# Patient Record
Sex: FEMALE | Race: WHITE | Employment: OTHER | URBAN - METROPOLITAN AREA
[De-identification: names, ages, dates, MRNs, and addresses within clinical notes are randomized per-mention and may not be internally consistent; named-entity substitution may affect disease eponyms.]

---

## 2017-01-23 RX ORDER — BLOOD SUGAR DIAGNOSTIC
STRIP MISCELLANEOUS
Qty: 100 STRIP | Refills: 11 | Status: SHIPPED | OUTPATIENT
Start: 2017-01-23 | End: 2017-07-06 | Stop reason: SDUPTHER

## 2017-02-20 ENCOUNTER — LAB ONLY (OUTPATIENT)
Dept: INTERNAL MEDICINE CLINIC | Age: 72
End: 2017-02-20

## 2017-02-20 ENCOUNTER — HOSPITAL ENCOUNTER (OUTPATIENT)
Dept: LAB | Age: 72
Discharge: HOME | End: 2017-02-20
Payer: MEDICARE

## 2017-02-20 DIAGNOSIS — E78.5 HYPERLIPIDEMIA, UNSPECIFIED HYPERLIPIDEMIA TYPE: ICD-10-CM

## 2017-02-20 DIAGNOSIS — E11.9 TYPE 2 DIABETES MELLITUS WITHOUT COMPLICATION (HCC): ICD-10-CM

## 2017-02-20 PROCEDURE — 83036 HEMOGLOBIN GLYCOSYLATED A1C: CPT

## 2017-02-20 PROCEDURE — 36415 COLL VENOUS BLD VENIPUNCTURE: CPT

## 2017-02-20 PROCEDURE — 80053 COMPREHEN METABOLIC PANEL: CPT

## 2017-02-20 PROCEDURE — 80061 LIPID PANEL: CPT

## 2017-02-21 LAB
ALBUMIN SERPL-MCNC: 4.1 G/DL (ref 3.5–4.8)
ALBUMIN/GLOB SERPL: 1.9 {RATIO} (ref 1.1–2.5)
ALP SERPL-CCNC: 56 IU/L (ref 39–117)
ALT SERPL-CCNC: 15 IU/L (ref 0–32)
AST SERPL-CCNC: 19 IU/L (ref 0–40)
BILIRUB SERPL-MCNC: 0.4 MG/DL (ref 0–1.2)
BUN SERPL-MCNC: 14 MG/DL (ref 8–27)
BUN/CREAT SERPL: 20 (ref 11–26)
CALCIUM SERPL-MCNC: 9.4 MG/DL (ref 8.7–10.3)
CHLORIDE SERPL-SCNC: 104 MMOL/L (ref 96–106)
CHOLEST SERPL-MCNC: 161 MG/DL (ref 100–199)
CO2 SERPL-SCNC: 25 MMOL/L (ref 18–29)
CREAT SERPL-MCNC: 0.71 MG/DL (ref 0.57–1)
EST. AVERAGE GLUCOSE BLD GHB EST-MCNC: 163 MG/DL
GLOBULIN SER CALC-MCNC: 2.2 G/DL (ref 1.5–4.5)
GLUCOSE SERPL-MCNC: 87 MG/DL (ref 65–99)
HBA1C MFR BLD: 7.3 % (ref 4.8–5.6)
HDLC SERPL-MCNC: 74 MG/DL
INTERPRETATION, 910389: NORMAL
LDLC SERPL CALC-MCNC: 78 MG/DL (ref 0–99)
Lab: NORMAL
POTASSIUM SERPL-SCNC: 4.7 MMOL/L (ref 3.5–5.2)
PROT SERPL-MCNC: 6.3 G/DL (ref 6–8.5)
SODIUM SERPL-SCNC: 144 MMOL/L (ref 134–144)
TRIGL SERPL-MCNC: 45 MG/DL (ref 0–149)
VLDLC SERPL CALC-MCNC: 9 MG/DL (ref 5–40)

## 2017-02-27 ENCOUNTER — TELEPHONE (OUTPATIENT)
Dept: INTERNAL MEDICINE CLINIC | Age: 72
End: 2017-02-27

## 2017-02-27 ENCOUNTER — OFFICE VISIT (OUTPATIENT)
Dept: INTERNAL MEDICINE CLINIC | Age: 72
End: 2017-02-27

## 2017-02-27 VITALS
RESPIRATION RATE: 16 BRPM | HEART RATE: 82 BPM | TEMPERATURE: 98.2 F | WEIGHT: 150.6 LBS | OXYGEN SATURATION: 96 % | BODY MASS INDEX: 24.2 KG/M2 | DIASTOLIC BLOOD PRESSURE: 78 MMHG | SYSTOLIC BLOOD PRESSURE: 119 MMHG | HEIGHT: 66 IN

## 2017-02-27 DIAGNOSIS — Z79.4 TYPE 2 DIABETES MELLITUS WITHOUT COMPLICATION, WITH LONG-TERM CURRENT USE OF INSULIN (HCC): ICD-10-CM

## 2017-02-27 DIAGNOSIS — Z12.31 ENCOUNTER FOR SCREENING MAMMOGRAM FOR BREAST CANCER: ICD-10-CM

## 2017-02-27 DIAGNOSIS — Z00.00 ROUTINE GENERAL MEDICAL EXAMINATION AT A HEALTH CARE FACILITY: Primary | ICD-10-CM

## 2017-02-27 DIAGNOSIS — Z13.39 SCREENING FOR ALCOHOLISM: ICD-10-CM

## 2017-02-27 DIAGNOSIS — E11.9 TYPE 2 DIABETES MELLITUS WITHOUT COMPLICATION, WITH LONG-TERM CURRENT USE OF INSULIN (HCC): ICD-10-CM

## 2017-02-27 DIAGNOSIS — E78.5 HYPERLIPIDEMIA, UNSPECIFIED HYPERLIPIDEMIA TYPE: ICD-10-CM

## 2017-02-27 DIAGNOSIS — M81.0 OSTEOPOROSIS: ICD-10-CM

## 2017-02-27 DIAGNOSIS — M21.612 BUNION OF GREAT TOE OF LEFT FOOT: ICD-10-CM

## 2017-02-27 NOTE — PROGRESS NOTES
HPI  Ms. Rl Hurt is a 66y.o. year old female, she is seen today for follow up HTN, high cholesterol, DM. Brings glucose log:   Am fastin-195  Evening 114-226   Numbers have been much better in the last 2 weeks as she has been more diligent with following diabetic diet. Exercises at [employer] to the pool 2-3 times per week - also taking maricel chi class. Denies chest pain, shortness of breath, dizziness/lightheadedness, headaches, visual changes, edema. Also c/o painful knot left on left foot, irritated area right foot - no sores on feet. No loss of sensation. No injury. Area on right foot has resolved now, felt like small granule bottom of foot. Saw gyn in fall, had breast exam.  No changes noted on bse. Chief Complaint   Patient presents with    Blood Pressure Check     Room 2// NON fasting    Cholesterol Problem     Review Lab Results    Diabetes    Foot Problem     irritated area on bilat. feet         Prior to Admission medications    Medication Sig Start Date End Date Taking? Authorizing Provider   [facility] ULTRA TEST strip USE TO TEST BLOOD SUGAR THREE TIMES A DAY DX E11.9 17  Yes Antwon Quintero MD   BD INSULIN SYRINGE ULTRA-FINE 0.3 mL 31 gauge x  syrg USE ONE (1) TWICE DAILY 10/31/16  Yes Antwon Quintero MD   metFORMIN (GLUCOPHAGE) 500 mg tablet TAKE TWO (2) TABLET(S) EAC H MORNING, TAKE ONE TABLET AT NOON, AND TAKE ONE TABLE T WITH DINNER 16  Yes Antwon Quintero MD   spironolactone (ALDACTONE) 50 mg tablet TAKE ONE (1) TABLET(S) DAILY 16  Yes Antwon Quintero MD   LANTUS 100 unit/mL injection INJECT 11 UNITS EVERY MORNI NG AND 3 UNITS EVERY EVENI NG FOR DIABETES CONTROL 16  Yes Antwon Quintero MD   losartan (COZAAR) 50 mg tablet TAKE ONE (1) TABLET(S) DAILY 3/28/16  Yes MD INDIRA Martin ULTRASOFT LANCETS misc TEST FOUR TIMES DAILY . Kaylee Dread Kaylee Dread .02 8/27/15  Yes MD INDIRA Wen ULTRASOFT LANCETS misc TEST FOUR TIMES DAILY .. Mlraymundo Peralta .02 4/4/15  Yes Dez Villarreal MD   Calcium Carbonate-Vit D3-Min (CALTRATE 600+D PLUS MINERALS) 600 mg (1,500 mg)-400 unit Chew Take  by mouth daily. 6/27/12  Yes Dipak Banerjee MD   multivitamins-minerals-lutein (CENTRUM SILVER) Tab Take  by mouth. Yes Historical Provider   cholecalciferol, vitamin d3, (VITAMIN D) 1,000 unit tablet Take  by mouth daily. Yes Historical Provider         Allergies   Allergen Reactions    Lisinopril Cough    Simvastatin Myalgia         REVIEW OF SYSTEMS:  Per HPI    PHYSICAL EXAM:  Visit Vitals    /78 (BP 1 Location: Right arm, BP Patient Position: Sitting)    Pulse 82    Temp 98.2 °F (36.8 °C) (Oral)    Resp 16    Ht 5' 6\" (1.676 m)    Wt 150 lb 9.6 oz (68.3 kg)    SpO2 96%    BMI 24.31 kg/m2     Constitutional: Appears well-developed and well-nourished. No distress. HENT:   Head: Normocephalic and atraumatic. Eyes: No scleral icterus. Neck: no lad, no tm, supple   Cardiovascular: Normal S1/S2, regular rhythm. No murmurs, rubs, or gallops. Pulmonary/Chest: Effort normal and breath sounds normal. No respiratory distress. No wheezes, rhonchi, or rales. Ext: No edema. Neurological: Alert. Psychiatric: Normal mood and affect. Behavior is normal.    DM foot exam: 2+ pedal pulses, no lesions, sensation intact to monofilament b/l , +bunion left foot      Lab Results   Component Value Date/Time    Sodium 144 02/20/2017 09:28 AM    Potassium 4.7 02/20/2017 09:28 AM    Chloride 104 02/20/2017 09:28 AM    CO2 25 02/20/2017 09:28 AM    Anion gap 9 11/01/2010 08:07 AM    Glucose 87 02/20/2017 09:28 AM    BUN 14 02/20/2017 09:28 AM    Creatinine 0.71 02/20/2017 09:28 AM    BUN/Creatinine ratio 20 02/20/2017 09:28 AM    GFR est AA 98 02/20/2017 09:28 AM    GFR est non-AA 85 02/20/2017 09:28 AM    Calcium 9.4 02/20/2017 09:28 AM    Bilirubin, total 0.4 02/20/2017 09:28 AM    AST (SGOT) 19 02/20/2017 09:28 AM    Alk.  phosphatase 56 02/20/2017 09:28 AM    Protein, total 6.3 02/20/2017 09:28 AM    Albumin 4.1 02/20/2017 09:28 AM    Globulin 2.4 11/01/2010 08:07 AM    A-G Ratio 1.9 02/20/2017 09:28 AM    ALT (SGPT) 15 02/20/2017 09:28 AM     Lab Results   Component Value Date/Time    Hemoglobin A1c 7.3 02/20/2017 09:28 AM    Hemoglobin A1c 7.0 08/17/2016 10:34 AM    Hemoglobin A1c 7.0 02/16/2016 08:39 AM      Lab Results   Component Value Date/Time    Cholesterol, total 161 02/20/2017 09:28 AM    HDL Cholesterol 74 02/20/2017 09:28 AM    LDL, calculated 78 02/20/2017 09:28 AM    VLDL, calculated 9 02/20/2017 09:28 AM    Triglyceride 45 02/20/2017 09:28 AM    CHOL/HDL Ratio 1.5 11/01/2010 08:07 AM          ASSESSMENT/PLAN  Dima Ortega was seen today for blood pressure check, cholesterol problem, diabetes and foot problem. Diagnoses and all orders for this visit:    Routine general medical examination at a health care facility    Screening for alcoholism    Osteoporosis  -     DEXA BONE DENSITY STUDY AXIAL; Future  Last bmd 2012 - was osteopenia - due for repeat  Hyperlipidemia, unspecified hyperlipidemia type  -     METABOLIC PANEL, COMPREHENSIVE; Future  -     LIPID PANEL; Future  Lipids excellent  Type 2 diabetes mellitus without complication, with long-term current use of insulin (HCC)  -     MICROALBUMIN, UR, RAND W/ MICROALBUMIN/CREA RATIO  -      DIABETES FOOT EXAM  -     HEMOGLOBIN A1C WITH EAG; Future  a1c not to goal - glucose levels much better in the last week with change in diet - continue current changes and exercise and will not change medications - patient will continue to check glucose at home and call if high  Encounter for screening mammogram for breast cancer  -     [facility] 3D NELIDA W MAMMO BI SCREENING INCL CAD;  Future    Bunion of great toe of left foot  -     REFERRAL TO PODIATRY          Health Maintenance Due   Topic Date Due    FOOT EXAM Q1  02/24/2017    MICROALBUMIN Q1  02/24/2017        Follow-up Disposition:  Return in about 6 months (around 8/27/2017) for bp, chol, dm, labs prior. Reviewed plan of care. Patient has provided input and agrees with goals. The nurse provided the patient and/or family with advanced directive information if needed and encouraged the patient to provide a copy to the office when available. This is a Subsequent Medicare Annual Wellness Visit providing Personalized Prevention Plan Services (PPPS) (Performed 12 months after initial AWV and PPPS )    I have reviewed the patient's medical history in detail and updated the computerized patient record.      History     Past Medical History:   Diagnosis Date    Cancer ([address]     skin     Depression     mild with occasional use of prozac    Diabetes (HCC)     Nausea & vomiting     Psychiatric disorder     depression      Past Surgical History:   Procedure Laterality Date    BREAST SURGERY PROCEDURE UNLISTED      left breast cystectomy    COLONOSCOPY N/A 11/16/2016    COLON[facility]itez [facility]  11/16/2016         HX HYSTERECTOMY      HX ORTHOPAEDIC      right hip arthroscopy - Dr. Ana Rosa Grier HX OTHER SURGICAL      right leg varicous vein removed    HX EDMUNDO AND BSO  1992     paps  wnl fibroids    HX TONSILLECTOMY      OR COLSC FLX W/REMOVAL LESION BY HOT BX FORCEPS  8/22/2011          Current Outpatient Prescriptions   Medication Sig Dispense Refill    ONETOUCH ULTRA TEST strip USE TO TEST BLOOD SUGAR THREE TIMES A DAY DX E11.9 100 Strip 11    BD INSULIN SYRINGE ULTRA-FINE 0.3 mL 31 gauge x 5/16 syrg USE ONE (1) TWICE DAILY 60 Syringe 11    metFORMIN (GLUCOPHAGE) 500 mg tablet TAKE TWO (2) TABLET(S) EAC H MORNING, TAKE ONE TABLET AT NOON, AND TAKE ONE TABLE T WITH DINNER 120 Tab 11    spironolactone (ALDACTONE) 50 mg tablet TAKE ONE (1) TABLET(S) DAILY 30 Tab 11    LANTUS 100 unit/mL injection INJECT 11 UNITS EVERY MORNI NG AND 3 UNITS EVERY EVENI NG FOR DIABETES CONTROL 10 mL 11    losartan (COZAAR) 50 mg tablet TAKE ONE (1) TABLET(S) DAILY 90 Tab 1    ONETOUCH ULTRASOFT LANCETS misc TEST FOUR TIMES DAILY . Breanna Allison .02 100 Each prn    ONETOUCH ULTRASOFT LANCETS misc TEST FOUR TIMES DAILY . Breanna Allison .02 120 Each 4    Calcium Carbonate-Vit D3-Min (CALTRATE 600+D PLUS MINERALS) 600 mg (1,500 mg)-400 unit Chew Take  by mouth daily.  multivitamins-minerals-lutein (CENTRUM SILVER) Tab Take  by mouth.  cholecalciferol, vitamin d3, (VITAMIN D) 1,000 unit tablet Take  by mouth daily. Allergies   Allergen Reactions    Lisinopril Cough    Simvastatin Myalgia     Family History   Problem Relation Age of Onset    Hypertension Brother      Social History   Substance Use Topics    Smoking status: Never Smoker    Smokeless tobacco: Never Used    Alcohol use 0.0 oz/week      Comment: occassionally     Patient Active Problem List   Diagnosis Code    Diabetes mellitus ([facility] [address] E11.9    Colon polyps K63.5    Compression fracture of thoracic vertebra (HCC) S22.000A    Right hip pain M25.551    Hyperlipidemia E78.5    Tinea unguium B35.1    Skin cancer C44.90    Hip pain, left M25.552    Osteoporosis M81.0    Dense breast R92.2       Depression Risk Factor Screening:     PHQ 2 / 9, over the last two weeks 8/31/2016   Little interest or pleasure in doing things Not at all   Feeling down, depressed or hopeless Not at all   Total Score PHQ 2 0     Alcohol Risk Factor Screening: On any occasion during the past 3 months, have you had more than 3 drinks containing alcohol? No    Do you average more than 7 drinks per week? No      Functional Ability and Level of Safety:     Hearing Loss   mild    Activities of Daily Living   Self-care. Requires assistance with: no ADLs    Fall Risk     Fall Risk Assessment, last 12 mths 8/31/2016   Able to walk? Yes   Fall in past 12 months?  No     Abuse Screen   Patient is not abused    Review of Systems   Pertinent items are noted in HPI. Physical Examination     Evaluation of Cognitive Function:  Mood/affect:  happy  Appearance: age appropriate  Family member/caregiver input: n/a      Patient Care Team:  Aylin Arreola MD as PCP - General (Internal Medicine)    Advice/Referrals/Counseling   Education and counseling provided:  Are appropriate based on today's review and evaluation      Assessment/Plan   Caprice Augustin was seen today for blood pressure check, cholesterol problem, diabetes and foot problem. Diagnoses and all orders for this visit:    Routine general medical examination at a health care facility    Screening for alcoholism    Osteoporosis  -     DEXA BONE DENSITY STUDY AXIAL; Future    Hyperlipidemia, unspecified hyperlipidemia type  -     METABOLIC PANEL, COMPREHENSIVE; Future  -     LIPID PANEL; Future    Type 2 diabetes mellitus without complication, with long-term current use of insulin (HCC)  -     MICROALBUMIN, UR, RAND W/ MICROALBUMIN/CREA RATIO  -      DIABETES FOOT EXAM  -     HEMOGLOBIN A1C WITH EAG; Future    Encounter for screening mammogram for breast cancer  -     [facility] 3D NELIDA W MAMMO BI SCREENING INCL CAD; Future    Bunion of great toe of left foot  -     REFERRAL TO PODIATRY      Follow-up Disposition:  Return in about 6 months (around 8/27/2017) for bp, chol, dm, labs prior. Miles Brown

## 2017-02-27 NOTE — MR AVS SNAPSHOT
Visit Information Date & Time Provider Department Dept. Phone Encounter #  
 2/27/2017  8:30 AM Prachi Hardy MD [facility] [phone] [id number] Follow-up Instructions Return in about 6 months (around 8/27/2017) for bp, chol, dm, labs prior. Upcoming Health Maintenance Date Due  
 FOOT EXAM Q1 2/24/2017 MICROALBUMIN Q1 2/24/2017 HEMOGLOBIN A1C Q6M 8/20/2017 EYE EXAM RETINAL OR DILATED Q1 1/16/2018 LIPID PANEL Q1 2/20/2018 MEDICARE YEARLY EXAM 2/28/2018 BREAST CANCER SCRN MAMMOGRAM 3/24/2018 GLAUCOMA SCREENING Q2Y 1/16/2019 COLONOSCOPY 11/16/2021 DTaP/Tdap/Td series (2 - Td) 7/9/2023 Allergies as of 2/27/2017  Review Complete On: 2/27/2017 By: Prachi Hardy MD  
  
 Severity Noted Reaction Type Reactions Lisinopril  01/09/2013    Cough Simvastatin  08/24/2016    Myalgia Current Immunizations  Reviewed on 2/27/2017 Name Date Hepatitis A Vaccine 5/7/2012, 1/1/2003 Hepatitis B Vaccine 1/1/2003 IPV 5/7/2012, 12/1/2004 Influenza High Dose Vaccine PF 9/4/2015 Influenza Vaccine 10/30/2013 Influenza Vaccine Split 11/4/2010 Influenza Vaccine Whole 11/21/2012 Pneumococcal Conjugate (PCV-13) 9/4/2015 Pneumococcal Vaccine (Unspecified Type) 1/1/2009 TD Vaccine 10/1/2004 Tdap 7/9/2013 Zoster 1/1/2009 Reviewed by Prachi Hardy MD on 2/27/2017 at  8:47 AM  
You Were Diagnosed With   
  
 Codes Comments Osteoporosis    -  Primary ICD-10-CM: M81.0 ICD-9-CM: 733.00 Routine general medical examination at a health care facility     ICD-10-CM: Z00.00 ICD-9-CM: V70.0 Screening for alcoholism     ICD-10-CM: Z13.89 ICD-9-CM: V79.1 Hyperlipidemia, unspecified hyperlipidemia type     ICD-10-CM: E78.5 ICD-9-CM: 272.4 Type 2 diabetes mellitus without complication, with long-term current use of insulin (HCC)     ICD-10-CM: E11.9, Z79.4 ICD-9-CM: 250.00, V58.67   
 Encounter for screening mammogram for breast cancer     ICD-10-CM: Z12.31 
ICD-9-CM: V76.12 Vitals BP  
  
  
  
  
  
 119/78 (BP 1 Location: Right arm, BP Patient Position: Sitting) Vitals History BMI and BSA Data Body Mass Index Body Surface Area  
 24.31 kg/m 2 1.78 m 2 Preferred Pharmacy Pharmacy Name Phone [facility] [phone] Your Updated Medication List  
  
   
This list is accurate as of: 2/27/17  8:57 AM.  Always use your most recent med list.  
  
  
  
  
 BD INSULIN SYRINGE ULTRA-FINE 0.3 mL 31 gauge x 5/16 Syrg Generic drug:  Insulin Syringe-Needle U-100  
USE ONE (1) TWICE DAILY CALTRATE 600+D PLUS MINERALS 600 mg (1,500 mg)-400 unit Chew Generic drug:  Calcium Carbonate-Vit D3-Min Take  by mouth daily. CENTRUM SILVER Tab tablet Generic drug:  multivitamins-minerals-lutein Take  by mouth. LANTUS 100 unit/mL injection Generic drug:  insulin glargine INJECT 11 UNITS EVERY MORNI NG AND 3 UNITS EVERY EVENI NG FOR DIABETES CONTROL  
  
 losartan 50 mg tablet Commonly known as:  COZAAR  
TAKE ONE (1) TABLET(S) DAILY  
  
 metFORMIN 500 mg tablet Commonly known as:  GLUCOPHAGE  
TAKE TWO (2) TABLET(S) EAC H MORNING, TAKE ONE TABLET AT NOON, AND TAKE ONE TABLE T WITH DINNER  
  
 ONETOUCH ULTRA TEST strip Generic drug:  glucose blood VI test strips USE TO TEST BLOOD SUGAR THREE TIMES A DAY DX E11.9  
  
 * ONETOUCH ULTRASOFT LANCETS Misc Generic drug:  Lancets TEST FOUR TIMES DAILY . [place] .02  
  
 * ONETOUCH ULTRASOFT LANCETS Misc Generic drug:  Lancets TEST FOUR TIMES DAILY . [place] .02  
  
 spironolactone 50 mg tablet Commonly known as:  ALDACTONE  
TAKE ONE (1) TABLET(S) DAILY  
  
 VITAMIN D3 1,000 unit tablet Generic drug:  cholecalciferol Take  by mouth daily. * Notice:   This list has 2 medication(s) that are the same as other medications prescribed for you. Read the directions carefully, and ask your doctor or other care provider to review them with you. We Performed the Following  DIABETES FOOT EXAM [7 Custom] MICROALBUMIN, UR, RAND W/ MICROALBUMIN/CREA RATIO [id number] CPT(R)] Follow-up Instructions Return in about 6 months (around 8/27/2017) for bp, chol, dm, labs prior. To-Do List   
 02/27/2017 Imaging:  DEXA BONE DENSITY STUDY AXIAL   
  
 03/27/2017 Imaging:  [facility] 3D NELIDA W MAMMO BI SCREENING INCL CAD   
  
 08/26/2017 Lab:  HEMOGLOBIN A1C WITH EAG   
  
 08/26/2017 Lab:  LIPID PANEL   
  
 08/26/2017 Lab:  METABOLIC PANEL, COMPREHENSIVE Patient Instructions Medicare Wellness Visit, Female The best way to live healthy is to have a healthy lifestyle by eating a well-balanced diet, exercising regularly, limiting alcohol and stopping smoking. Regular physical exams and screening tests are another way to keep healthy. Preventive exams provided by your health care provider can find health problems before they become diseases or illnesses. Preventive services including immunizations, screening tests, monitoring and exams can help you take care of your own health. All people over age 72 should have a pneumovax  and and a prevnar shot to prevent pneumonia. These are once in a lifetime unless you and your provider decide differently. All people over 65 should have a yearly flu shot and a tetanus vaccine every 10 years. A bone mass density to screen for osteoporosis or thinning of the bones should be done every 2 years after 65. Screening for diabetes mellitus with a blood sugar test should be done every year.  
 
Glaucoma is a disease of the eye due to increased ocular pressure that can lead to blindness and it should be done every year by an eye professional. 
 
Cardiovascular screening tests that check for elevated lipids (fatty part of blood) which can lead to heart disease and strokes should be done every 5 years. Colorectal screening that evaluates for blood or polyps in your colon should be done yearly as a stool test or every five years as a flexible sigmoidoscope or every 10 years as a colonoscopy up to age 76. Breast cancer screening with a mammogram is recommended biennially  for women age 54-69. Screening for cervical cancer with a pap smear and pelvic exam is recommended for women after age 72 years every 2 years up to age 79 or when the provider and patient decide to stop. If there is a history of cervical abnormalities or other increased risk for cancer then the test is recommended yearly. Hepatitis C screening is also recommended for anyone born between [birth year] through [address]. A shingles vaccine is also recommended once in a lifetime after age 61. Your Medicare Wellness Exam is recommended annually. Here is a list of your current Health Maintenance items with a due date: 
Health Maintenance Due Topic Date Due  
 Annual Well Visit  02/24/2017 [facility] Diabetic Foot Care  02/24/2017  Albumin Urine Test  02/24/2017 Introducing \[facility]\"" & HEALTH SERVICES! Dear Magdaleno Ramos: Thank you for requesting a [employer] account. Our records indicate that you already have an active [employer] account. You can access your account anytime at [email or web address] Did you know that you can access your hospital and ER discharge instructions at any time in [employer]? You can also review all of your test results from your hospital stay or ER visit. Additional Information If you have questions, please visit the Frequently Asked Questions section of the [employer] website at [email or web address] Remember, [employer] is NOT to be used for urgent needs. For medical emergencies, dial 911. Now available from your iPhone and Android! Please provide this summary of care documentation to your next provider. Your primary care clinician is listed as Drea Grace. If you have any questions after today's visit, please call [phone].

## 2017-02-27 NOTE — PATIENT INSTRUCTIONS

## 2017-02-27 NOTE — PROGRESS NOTES
Patient received paperwork for advance directive during previous visit but has not completed at this time     Reviewed record In preparation for visit and have obtained necessary documentation      1. Have you been to the ER, urgent care clinic since your last visit? Hospitalized since your last visit? NO    2. Have you seen or consulted any other health care providers outside of the [facility] since your last visit? Include any pap smears or colon screening.  NO    Dr Bayron Nunez was seen approx Jan 2017  Dr Melissa Greenfield seen in Feb 2017  Colonoscopy done Fall 2016 with Dr Ashia Brown seen at [facility] seen Fall 2016

## 2017-02-27 NOTE — ACP (ADVANCE CARE PLANNING)
Advance Care Planning (ACP) Provider Conversation Snapshot    Date of ACP Conversation: 02/27/17  Persons included in Conversation:  patient  Length of ACP Conversation in minutes:  <16 minutes (Non-Billable)    Authorized Decision Maker (if patient is incapable of making informed decisions): This person is:   nephew - Ananth Fontaine          For Patients with Decision Making Capacity:   Values/Goals: Exploration of values, goals, and preferences if recovery is not expected, even with continued medical treatment in the event of:  Imminent death  Severe, permanent brain injury  \"In these circumstances, what matters most to you? \"  Care focused more on comfort or quality of life.     Conversation Outcomes / Follow-Up Plan:   Recommended communicating the plan and making copies for the healthcare agent, personal physician, and others as appropriate (e.g., health system)

## 2017-03-06 RX ORDER — LANCETS
EACH MISCELLANEOUS
Qty: 100 EACH | Refills: 11 | Status: SHIPPED | OUTPATIENT
Start: 2017-03-06 | End: 2017-06-07 | Stop reason: SDUPTHER

## 2017-03-13 ENCOUNTER — HOSPITAL ENCOUNTER (OUTPATIENT)
Dept: MAMMOGRAPHY | Age: 72
Discharge: HOME | End: 2017-03-13
Payer: MEDICARE

## 2017-03-13 DIAGNOSIS — M81.0 OSTEOPOROSIS: ICD-10-CM

## 2017-03-13 PROCEDURE — 77080 DXA BONE DENSITY AXIAL: CPT

## 2017-03-31 ENCOUNTER — HOSPITAL ENCOUNTER (OUTPATIENT)
Dept: MAMMOGRAPHY | Age: 72
Discharge: HOME | End: 2017-03-31
Attending: INTERNAL MEDICINE
Payer: MEDICARE

## 2017-03-31 DIAGNOSIS — Z12.31 ENCOUNTER FOR SCREENING MAMMOGRAM FOR BREAST CANCER: ICD-10-CM

## 2017-03-31 PROCEDURE — 77063 BREAST TOMOSYNTHESIS BI: CPT

## 2017-04-05 RX ORDER — LOSARTAN POTASSIUM 50 MG/1
TABLET ORAL
Qty: 90 TAB | Refills: 4 | Status: SHIPPED | OUTPATIENT
Start: 2017-04-05 | End: 2018-07-26 | Stop reason: SDUPTHER

## 2017-05-31 RX ORDER — INSULIN GLARGINE 100 [IU]/ML
INJECTION, SOLUTION SUBCUTANEOUS
Qty: 10 ML | Refills: 11 | Status: SHIPPED | OUTPATIENT
Start: 2017-05-31 | End: 2018-05-28 | Stop reason: SDUPTHER

## 2017-06-07 ENCOUNTER — OFFICE VISIT (OUTPATIENT)
Dept: INTERNAL MEDICINE CLINIC | Age: 72
End: 2017-06-07

## 2017-06-07 VITALS
RESPIRATION RATE: 16 BRPM | WEIGHT: 152 LBS | HEIGHT: 66 IN | BODY MASS INDEX: 24.43 KG/M2 | TEMPERATURE: 97.6 F | OXYGEN SATURATION: 95 % | SYSTOLIC BLOOD PRESSURE: 136 MMHG | HEART RATE: 73 BPM | DIASTOLIC BLOOD PRESSURE: 78 MMHG

## 2017-06-07 DIAGNOSIS — M25.511 ACUTE PAIN OF RIGHT SHOULDER: Primary | ICD-10-CM

## 2017-06-07 RX ORDER — DICLOFENAC SODIUM 75 MG/1
75 TABLET, DELAYED RELEASE ORAL
Qty: 30 TAB | Refills: 1 | Status: SHIPPED | OUTPATIENT
Start: 2017-06-07 | End: 2017-08-29 | Stop reason: ALTCHOICE

## 2017-06-07 RX ORDER — DICLOFENAC SODIUM 75 MG/1
TABLET, DELAYED RELEASE ORAL
Refills: 0 | COMMUNITY
Start: 2017-03-20 | End: 2017-06-07 | Stop reason: ALTCHOICE

## 2017-06-07 NOTE — MR AVS SNAPSHOT
Visit Information Date & Time Provider Department Dept. Phone Encounter #  
 6/7/2017  1:45 PM MD Maryam Moss [phone] [id number] Your Appointments 8/22/2017  8:15 AM  
LAB with LAB [facility] Maryam [facility]) Appt Note: fasting lab  
 [address] [address] [phone]  
  
   
 [address]  
  
    
 8/29/2017  8:30 AM  
ROUTINE CARE with MD Maryam Moss [place]) Appt Note: bp chol dm / $0cp Kg  02.27.17  
 [address] [address] [phone]  
  
   
 [address] Upcoming Health Maintenance Date Due MICROALBUMIN Q1 2/24/2017 INFLUENZA AGE 9 TO ADULT 8/1/2017 HEMOGLOBIN A1C Q6M 8/20/2017 EYE EXAM RETINAL OR DILATED Q1 1/16/2018 LIPID PANEL Q1 2/20/2018 FOOT EXAM Q1 2/27/2018 MEDICARE YEARLY EXAM 2/28/2018 GLAUCOMA SCREENING Q2Y 1/16/2019 BREAST CANCER SCRN MAMMOGRAM 3/31/2019 COLONOSCOPY 11/16/2021 DTaP/Tdap/Td series (2 - Td) 7/9/2023 Allergies as of 6/7/2017  Review Complete On: 6/7/2017 By: Brinda Lemus MD  
  
 Severity Noted Reaction Type Reactions Lisinopril  01/09/2013    Cough Simvastatin  08/24/2016    Myalgia Current Immunizations  Reviewed on 2/27/2017 Name Date Hepatitis A Vaccine 5/7/2012, 1/1/2003 Hepatitis B Vaccine 1/1/2003 IPV 5/7/2012, 12/1/2004 Influenza High Dose Vaccine PF 9/4/2015 Influenza Vaccine 10/30/2013 Influenza Vaccine Split 11/4/2010 Influenza Vaccine Whole 11/21/2012 Pneumococcal Conjugate (PCV-13) 9/4/2015 Pneumococcal Vaccine (Unspecified Type) 1/1/2009 TD Vaccine 10/1/2004 Tdap 7/9/2013 Zoster 1/1/2009 Not reviewed this visit You Were Diagnosed With   
  
 Codes Comments Acute pain of right shoulder    -  Primary ICD-10-CM: M25.511 ICD-9-CM: 719.41 Vitals BP Pulse Temp Resp Height(growth percentile) Weight(growth percentile) 136/78 (BP 1 Location: Right arm, BP Patient Position: Sitting) 73 97.6 °F (36.4 °C) (Oral) 16 5' 6\" (1.676 m) 152 lb (68.9 kg) SpO2 BMI OB Status Smoking Status 95% 24.53 kg/m2 Hysterectomy Never Smoker Vitals History BMI and BSA Data Body Mass Index Body Surface Area 24.53 kg/m 2 1.79 m 2 Preferred Pharmacy Pharmacy Name Phone [facility] [address] [phone] Your Updated Medication List  
  
   
This list is accurate as of: 6/7/17  2:42 PM.  Always use your most recent med list.  
  
  
  
  
 BD INSULIN SYRINGE ULTRA-FINE 0.3 mL 31 gauge x 5/16 Syrg Generic drug:  Insulin Syringe-Needle U-100  
USE ONE (1) TWICE DAILY CALTRATE 600+D PLUS MINERALS 600 mg (1,500 mg)-400 unit Chew Generic drug:  Calcium Carbonate-Vit D3-Min Take  by mouth daily. CENTRUM SILVER Tab tablet Generic drug:  multivitamins-minerals-lutein Take  by mouth. diclofenac EC 75 mg EC tablet Commonly known as:  VOLTAREN Take 1 Tab by mouth two (2) times daily as needed. LANTUS 100 unit/mL injection Generic drug:  insulin glargine INJECT 11 UNITS EVERY MORNING AND 3 UNITS EVERY EVENING FOR DIABETES CONTROL  
  
 losartan 50 mg tablet Commonly known as:  COZAAR  
TAKE ONE TABLET BY MOUTH DAILY  
  
 metFORMIN 500 mg tablet Commonly known as:  GLUCOPHAGE  
TAKE TWO (2) TABLET(S) EAC H MORNING, TAKE ONE TABLET AT NOON, AND TAKE ONE TABLE T WITH DINNER  
  
 ONETOUCH ULTRA TEST strip Generic drug:  glucose blood VI test strips USE TO TEST BLOOD SUGAR THREE TIMES A DAY DX E11.9 ONETOUCH ULTRASOFT LANCETS Misc Generic drug:  Lancets TEST FOUR TIMES DAILY . Huber Donna Huber Donna .02  
  
 spironolactone 50 mg tablet Commonly known as:  ALDACTONE  
TAKE ONE (1) TABLET(S) DAILY  
  
 VITAMIN D3 1,000 unit tablet Generic drug:  cholecalciferol Take  by mouth daily. Prescriptions Sent to Pharmacy Refills  
 diclofenac EC (VOLTAREN) 75 mg EC tablet 1 Sig: Take 1 Tab by mouth two (2) times daily as needed. Class: Normal  
 Pharmacy: [facility] [address] [facility] #: [phone] Route: Oral  
  
To-Do List   
 06/07/2017 Imaging:  XR SHOULDER RT AP/LAT MIN 2 V Patient Instructions Rotator Cuff: Exercises Your Care Instructions Here are some examples of typical rehabilitation exercises for your condition. Start each exercise slowly. Ease off the exercise if you start to have pain. Your doctor or physical therapist will tell you when you can start these exercises and which ones will work best for you. How to do the exercises Pendulum swing Note: If you have pain in your back, do not do this exercise. 1. Hold on to a table or the back of a chair with your good arm. Then bend forward a little and let your sore arm hang straight down. This exercise does not use the arm muscles. Rather, use your legs and your hips to create movement that makes your arm swing freely. 2. Use the movement from your hips and legs to guide the slightly swinging arm back and forth like a pendulum (or elephant trunk). Then guide it in circles that start small (about the size of a dinner plate). Make the circles a bit larger each day, as your pain allows. 3. Do this exercise for 5 minutes, 5 to 7 times each day. 4. As you have less pain, try bending over a little farther to do this exercise. This will increase the amount of movement at your shoulder. Posterior stretching exercise 1. Hold the elbow of your injured arm with your other hand. 2. Use your hand to pull your injured arm gently up and across your body. You will feel a gentle stretch across the back of your injured shoulder. 3. Hold for at least 15 to 30 seconds. Then slowly lower your arm. 4. Repeat 2 to 4 times. Up-the-back stretch Note: Your doctor or physical therapist may want you to wait to do this stretch until you have regained most of your range of motion and strength. You can do this stretch in different ways. Hold any of these stretches for at least 15 to 30 seconds. Repeat them 2 to 4 times. 1. Put your hand in your back pocket. Let it rest there to stretch your shoulder. 2. With your other hand, hold your injured arm (palm outward) behind your back by the wrist. Pull your arm up gently to stretch your shoulder. 3. Next, put a towel over your other shoulder. Put the hand of your injured arm behind your back. Now hold the back end of the towel. With the other hand, hold the front end of the towel in front of your body. Pull gently on the front end of the towel. This will bring your hand farther up your back to stretch your shoulder. Overhead stretch 1. Standing about an arm's length away, grasp onto a solid surface. You could use a countertop, a doorknob, or the back of a sturdy chair. 2. With your knees slightly bent, bend forward with your arms straight. Lower your upper body, and let your shoulders stretch. 3. As your shoulders are able to stretch farther, you may need to take a step or two backward. 4. Hold for at least 15 to 30 seconds. Then stand up and relax. If you had stepped back during your stretch, step forward so you can keep your hands on the solid surface. 5. Repeat 2 to 4 times. Shoulder flexion (lying down) Note: To make a wand for this exercise, use a piece of PVC pipe or a broom handle with the broom removed. Make the wand about a foot wider than your shoulders. 1. Lie on your back, holding a wand with both hands. Your palms should face down as you hold the wand. 2. Keeping your elbows straight, slowly raise your arms over your head. Raise them until you feel a stretch in your shoulders, upper back, and chest. 
3. Hold for 15 to 30 seconds. 4. Repeat 2 to 4 times. Shoulder rotation (lying down) Note: To make a wand for this exercise, use a piece of PVC pipe or a broom handle with the broom removed. Make the wand about a foot wider than your shoulders. 1. Lie on your back. Hold a wand with both hands with your elbows bent and palms up. 2. Keep your elbows close to your body, and move the wand across your body toward the sore arm. 3. Hold for 8 to 12 seconds. 4. Repeat 2 to 4 times. Wall climbing (to the side) Note: Avoid any movement that is straight to your side, and be careful not to arch your back. Your arm should stay about 30 degrees to the front of your side. 1. Stand with your side to a wall so that your fingers can just touch it at an angle about 30 degrees toward the front of your body. 2. Walk the fingers of your injured arm up the wall as high as pain permits. Try not to shrug your shoulder up toward your ear as you move your arm up. 3. Hold that position for a count of at least 15 to 20. 
4. Walk your fingers back down to the starting position. 5. Repeat at least 2 to 4 times. Try to reach higher each time. Wall climbing (to the front) Note: During this stretching exercise, be careful not to arch your back. 1. Face a wall, and stand so your fingers can just touch it. 2. Keeping your shoulder down, walk the fingers of your injured arm up the wall as high as pain permits. (Don't shrug your shoulder up toward your ear.) 3. Hold your arm in that position for at least 15 to 30 seconds. 4. Slowly walk your fingers back down to where you started. 5. Repeat at least 2 to 4 times. Try to reach higher each time. Shoulder blade squeeze 1.  Stand with your arms at your sides, and squeeze your shoulder blades together. Do not raise your shoulders up as you squeeze. 2. Hold 6 seconds. 3. Repeat 8 to 12 times. Scapular exercise: Arm reach 1. Lie flat on your back. This exercise is a very slight motion that starts with your arms raised (elbows straight, arms straight). 2. From this position, reach higher toward the stephane or ceiling. Keep your elbows straight. All motion should be from your shoulder blade only. 3. Relax your arms back to where you started. 4. Repeat 8 to 12 times. Arm raise to the side Note: During this strengthening exercise, your arm should stay about 30 degrees to the front of your side. 1. Slowly raise your injured arm to the side, with your thumb facing up. Raise your arm 60 degrees at the most (shoulder level is 90 degrees). 2. Hold the position for 3 to 5 seconds. Then lower your arm back to your side. If you need to, bring your \"good\" arm across your body and place it under the elbow as you lower your injured arm. Use your good arm to keep your injured arm from dropping down too fast. 
3. Repeat 8 to 12 times. 4. When you first start out, don't hold any extra weight in your hand. As you get stronger, you may use a 1-pound to 2-pound dumbbell or a small can of food. Shoulder flexor and extensor exercise Note: These are isometric exercises. That means you contract your muscles without actually moving. · Push forward (flex): Stand facing a wall or doorjamb, about 6 inches or less back. Hold your injured arm against your body. Make a closed fist with your thumb on top. Then gently push your hand forward into the wall with about 25% to 50% of your strength. Don't let your body move backward as you push. Hold for about 6 seconds. Relax for a few seconds. Repeat 8 to 12 times. · Push backward (extend): Stand with your back flat against a wall.  Your upper arm should be against the wall, with your elbow bent 90 degrees (your hand straight ahead). Push your elbow gently back against the wall with about 25% to 50% of your strength. Don't let your body move forward as you push. Hold for about 6 seconds. Relax for a few seconds. Repeat 8 to 12 times. Scapular exercise: Wall push-ups Note: This exercise is best done with your fingers somewhat turned out, rather than straight up and down. 1. Stand facing a wall, about 12 inches to 18 inches away. 2. Place your hands on the wall at shoulder height. 3. Slowly bend your elbows and bring your face to the wall. Keep your back and hips straight. 4. Push back to where you started. 5. Repeat 8 to 12 times. 6. When you can do this exercise against a wall comfortably, you can try it against a counter. You can then slowly progress to the end of a couch, then to a sturdy chair, and finally to the floor. Scapular exercise: Retraction Note: For this exercise, you will need elastic exercise material, such as surgical tubing or Thera-Band. 1. Put the band around a solid object at about waist level. (A bedpost will work well.) Each hand should hold an end of the band. 2. With your elbows at your sides and bent to 90 degrees, pull the band back. Your shoulder blades should move toward each other. Then move your arms back where you started. 3. Repeat 8 to 12 times. 4. If you have good range of motion in your shoulders, try this exercise with your arms lifted out to the sides. Keep your elbows at a 90-degree angle. Raise the elastic band up to about shoulder level. Pull the band back to move your shoulder blades toward each other. Then move your arms back where you started. Internal rotator strengthening exercise 1. Start by tying a piece of elastic exercise material to a doorknob. You can use surgical tubing or Thera-Band. 2. Stand or sit with your shoulder relaxed and your elbow bent 90 degrees. Your upper arm should rest comfortably against your side.  Squeeze a rolled towel between your elbow and your body for comfort. This will help keep your arm at your side. 3. Hold one end of the elastic band in the hand of the painful arm. 4. Slowly rotate your forearm toward your body until it touches your belly. Slowly move it back to where you started. 5. Keep your elbow and upper arm firmly tucked against the towel roll or at your side. 6. Repeat 8 to 12 times. External rotator strengthening exercise 1. Start by tying a piece of elastic exercise material to a doorknob. You can use surgical tubing or Thera-Band. (You may also hold one end of the band in each hand.) 2. Stand or sit with your shoulder relaxed and your elbow bent 90 degrees. Your upper arm should rest comfortably against your side. Squeeze a rolled towel between your elbow and your body for comfort. This will help keep your arm at your side. 3. Hold one end of the elastic band with the hand of the painful arm. 4. Start with your forearm across your belly. Slowly rotate the forearm out away from your body. Keep your elbow and upper arm tucked against the towel roll or the side of your body until you begin to feel tightness in your shoulder. Slowly move your arm back to where you started. 5. Repeat 8 to 12 times. Follow-up care is a key part of your treatment and safety. Be sure to make and go to all appointments, and call your doctor if you are having problems. It's also a good idea to know your test results and keep a list of the medicines you take. Where can you learn more? Go to http://bhargavi-sagar.info/. Enter Para Janeon in the search box to learn more about \"Rotator Cuff: Exercises. \" Current as of: May 23, 2016 Content Version: 11.2 © 7217-0183 [employer]. Care instructions adapted under license by [employer] (which disclaims liability or warranty for this information).  If you have questions about a medical condition or this instruction, always ask your healthcare professional. [address] any warranty or liability for your use of this information. Introducing [facility]! Dear Jorge A Sanchez: Thank you for requesting a D-Sight account. Our records indicate that you already have an active D-Sight account. You can access your account anytime at [email or web address] Did you know that you can access your hospital and ER discharge instructions at any time in D-Sight? You can also review all of your test results from your hospital stay or ER visit. Additional Information If you have questions, please visit the Frequently Asked Questions section of the D-Sight website at [email or web address] Remember, D-Sight is NOT to be used for urgent needs. For medical emergencies, dial 911. Now available from your iPhone and Android! Please provide this summary of care documentation to your next provider. Your primary care clinician is listed as Drea Grace. If you have any questions after today's visit, please call [phone].

## 2017-06-07 NOTE — PROGRESS NOTES
HPI  Ms. Erik Tong is a 66y.o. year old female, she is seen today for right shoulder pain. Says 20 years ago had bursitis in right shoulder. Pain started gradually, worse when she reaches back behind back. No weakness in arm. No known injury. Getting worse, hasn't taken anything for pain. No neck pain. No weight lifting. No change in exercise. May also hurt if lays on right side in bed. No cp or sob, no cough. Chief Complaint   Patient presents with    Shoulder Pain     Room 3// intermittent R shoulder pain x 1 month         Prior to Admission medications    Medication Sig Start Date End Date Taking? Authorizing Provider   diclofenac EC (VOLTAREN) 75 mg EC tablet Take 1 Tab by mouth two (2) times daily as needed. 6/7/17  Yes Cat Hardin MD   LANTUS 100 unit/mL injection INJECT 11 UNITS EVERY MORNING AND 3 UNITS EVERY EVENING FOR DIABETES CONTROL 5/31/17  Yes Cat Hardin MD   losartan (COZAAR) 50 mg tablet TAKE ONE TABLET BY MOUTH DAILY 4/5/17  Yes Cat Hardin MD   [facility] ULTRA TEST strip USE TO TEST BLOOD SUGAR THREE TIMES A DAY DX E11.9 1/23/17  Yes Cat Hardin MD   BD INSULIN SYRINGE ULTRA-FINE 0.3 mL 31 gauge x 5/16 syrg USE ONE (1) TWICE DAILY 10/31/16  Yes Cat Hardin MD   metFORMIN (GLUCOPHAGE) 500 mg tablet TAKE TWO (2) TABLET(S) EAC H MORNING, TAKE ONE TABLET AT NOON, AND TAKE ONE TABLE T WITH DINNER 9/19/16  Yes Cat Hardin MD   spironolactone (ALDACTONE) 50 mg tablet TAKE ONE (1) TABLET(S) DAILY 7/28/16  Yes Cat Hardin MD   ONETOUCH ULTRASOFT LANCETS misc TEST FOUR TIMES DAILY . Joshua Abdalla .02 4/4/15  Yes Cat Hardin MD   Calcium Carbonate-Vit D3-Min (CALTRATE 600+D PLUS MINERALS) 600 mg (1,500 mg)-400 unit Chew Take  by mouth daily. 6/27/12  Yes Melissa Kramer MD   multivitamins-minerals-lutein (CENTRUM SILVER) Tab Take  by mouth.    Yes Historical Provider   cholecalciferol, vitamin d3, (VITAMIN D) 1,000 unit tablet Take  by mouth daily. Yes Historical Provider         Allergies   Allergen Reactions    Lisinopril Cough    Simvastatin Myalgia         REVIEW OF SYSTEMS:  Per HPI    PHYSICAL EXAM:  Visit Vitals    /78 (BP 1 Location: Right arm, BP Patient Position: Sitting)    Pulse 73    Temp 97.6 °F (36.4 °C) (Oral)    Resp 16    Ht 5' 6\" (1.676 m)    Wt 152 lb (68.9 kg)    SpO2 95%    BMI 24.53 kg/m2     Constitutional: Appears well-developed and well-nourished. No distress. HENT:   Head: Normocephalic and atraumatic. Eyes: No scleral icterus. Neck: no lad, no tm, supple   Pulmonary/Chest: Effort normal and breath sounds normal. No respiratory distress. No wheezes, rhonchi, or rales. Right shoulder: pain with abduction, extension, internal rotation, no pain on palpation shoulder  Ext: No edema. Neurological: Alert. Strength 5/5 b/l ue  Psychiatric: Normal mood and affect. Behavior is normal.     Lab Results   Component Value Date/Time    Sodium 144 02/20/2017 09:28 AM    Potassium 4.7 02/20/2017 09:28 AM    Chloride 104 02/20/2017 09:28 AM    CO2 25 02/20/2017 09:28 AM    Anion gap 9 11/01/2010 08:07 AM    Glucose 87 02/20/2017 09:28 AM    BUN 14 02/20/2017 09:28 AM    Creatinine 0.71 02/20/2017 09:28 AM    BUN/Creatinine ratio 20 02/20/2017 09:28 AM    GFR est AA 98 02/20/2017 09:28 AM    GFR est non-AA 85 02/20/2017 09:28 AM    Calcium 9.4 02/20/2017 09:28 AM    Bilirubin, total 0.4 02/20/2017 09:28 AM    AST (SGOT) 19 02/20/2017 09:28 AM    Alk.  phosphatase 56 02/20/2017 09:28 AM    Protein, total 6.3 02/20/2017 09:28 AM    Albumin 4.1 02/20/2017 09:28 AM    Globulin 2.4 11/01/2010 08:07 AM    A-G Ratio 1.9 02/20/2017 09:28 AM    ALT (SGPT) 15 02/20/2017 09:28 AM     Lab Results   Component Value Date/Time    Hemoglobin A1c 7.3 02/20/2017 09:28 AM    Hemoglobin A1c 7.0 08/17/2016 10:34 AM    Hemoglobin A1c 7.0 02/16/2016 08:39 AM      Lab Results   Component Value Date/Time    Cholesterol, total 161 02/20/2017 09:28 AM    HDL Cholesterol 74 02/20/2017 09:28 AM    LDL, calculated 78 02/20/2017 09:28 AM    VLDL, calculated 9 02/20/2017 09:28 AM    Triglyceride 45 02/20/2017 09:28 AM    CHOL/HDL Ratio 1.5 11/01/2010 08:07 AM          ASSESSMENT/PLAN  Claire Roldan was seen today for shoulder pain. Diagnoses and all orders for this visit:    Acute pain of right shoulder  -     XR SHOULDER RT AP/LAT MIN 2 V; Future  -     diclofenac EC (VOLTAREN) 75 mg EC tablet; Take 1 Tab by mouth two (2) times daily as needed. Films viewed by me with djd - add voltaren, home exercise, if no improvement will refer to PT      Health Maintenance Due   Topic Date Due    MICROALBUMIN Q1  02/24/2017        Follow-up Disposition:  Return if symptoms worsen or fail to improve. Reviewed plan of care. Patient has provided input and agrees with goals. The nurse provided the patient and/or family with advanced directive information if needed and encouraged the patient to provide a copy to the office when available.

## 2017-06-07 NOTE — PATIENT INSTRUCTIONS
Rotator Cuff: Exercises  Your Care Instructions  Here are some examples of typical rehabilitation exercises for your condition. Start each exercise slowly. Ease off the exercise if you start to have pain. Your doctor or physical therapist will tell you when you can start these exercises and which ones will work best for you. How to do the exercises  Pendulum swing    Note: If you have pain in your back, do not do this exercise. 1. Hold on to a table or the back of a chair with your good arm. Then bend forward a little and let your sore arm hang straight down. This exercise does not use the arm muscles. Rather, use your legs and your hips to create movement that makes your arm swing freely. 2. Use the movement from your hips and legs to guide the slightly swinging arm back and forth like a pendulum (or elephant trunk). Then guide it in circles that start small (about the size of a dinner plate). Make the circles a bit larger each day, as your pain allows. 3. Do this exercise for 5 minutes, 5 to 7 times each day. 4. As you have less pain, try bending over a little farther to do this exercise. This will increase the amount of movement at your shoulder. Posterior stretching exercise    1. Hold the elbow of your injured arm with your other hand. 2. Use your hand to pull your injured arm gently up and across your body. You will feel a gentle stretch across the back of your injured shoulder. 3. Hold for at least 15 to 30 seconds. Then slowly lower your arm. 4. Repeat 2 to 4 times. Up-the-back stretch    Note: Your doctor or physical therapist may want you to wait to do this stretch until you have regained most of your range of motion and strength. You can do this stretch in different ways. Hold any of these stretches for at least 15 to 30 seconds. Repeat them 2 to 4 times. 1. Put your hand in your back pocket. Let it rest there to stretch your shoulder.   2. With your other hand, hold your injured arm (palm outward) behind your back by the wrist. Pull your arm up gently to stretch your shoulder. 3. Next, put a towel over your other shoulder. Put the hand of your injured arm behind your back. Now hold the back end of the towel. With the other hand, hold the front end of the towel in front of your body. Pull gently on the front end of the towel. This will bring your hand farther up your back to stretch your shoulder. Overhead stretch    1. Standing about an arm's length away, grasp onto a solid surface. You could use a countertop, a doorknob, or the back of a sturdy chair. 2. With your knees slightly bent, bend forward with your arms straight. Lower your upper body, and let your shoulders stretch. 3. As your shoulders are able to stretch farther, you may need to take a step or two backward. 4. Hold for at least 15 to 30 seconds. Then stand up and relax. If you had stepped back during your stretch, step forward so you can keep your hands on the solid surface. 5. Repeat 2 to 4 times. Shoulder flexion (lying down)    Note: To make a wand for this exercise, use a piece of PVC pipe or a broom handle with the broom removed. Make the wand about a foot wider than your shoulders. 1. Lie on your back, holding a wand with both hands. Your palms should face down as you hold the wand. 2. Keeping your elbows straight, slowly raise your arms over your head. Raise them until you feel a stretch in your shoulders, upper back, and chest.  3. Hold for 15 to 30 seconds. 4. Repeat 2 to 4 times. Shoulder rotation (lying down)    Note: To make a wand for this exercise, use a piece of PVC pipe or a broom handle with the broom removed. Make the wand about a foot wider than your shoulders. 1. Lie on your back. Hold a wand with both hands with your elbows bent and palms up. 2. Keep your elbows close to your body, and move the wand across your body toward the sore arm. 3. Hold for 8 to 12 seconds. 4. Repeat 2 to 4 times.   Tomer Jurado climbing (to the side)    Note: Avoid any movement that is straight to your side, and be careful not to arch your back. Your arm should stay about 30 degrees to the front of your side. 1. Stand with your side to a wall so that your fingers can just touch it at an angle about 30 degrees toward the front of your body. 2. Walk the fingers of your injured arm up the wall as high as pain permits. Try not to shrug your shoulder up toward your ear as you move your arm up. 3. Hold that position for a count of at least 15 to 20.  4. Walk your fingers back down to the starting position. 5. Repeat at least 2 to 4 times. Try to reach higher each time. Wall climbing (to the front)    Note: During this stretching exercise, be careful not to arch your back. 1. Face a wall, and stand so your fingers can just touch it. 2. Keeping your shoulder down, walk the fingers of your injured arm up the wall as high as pain permits. (Don't shrug your shoulder up toward your ear.)  3. Hold your arm in that position for at least 15 to 30 seconds. 4. Slowly walk your fingers back down to where you started. 5. Repeat at least 2 to 4 times. Try to reach higher each time. Shoulder blade squeeze    1. Stand with your arms at your sides, and squeeze your shoulder blades together. Do not raise your shoulders up as you squeeze. 2. Hold 6 seconds. 3. Repeat 8 to 12 times. Scapular exercise: Arm reach    1. Lie flat on your back. This exercise is a very slight motion that starts with your arms raised (elbows straight, arms straight). 2. From this position, reach higher toward the stephane or ceiling. Keep your elbows straight. All motion should be from your shoulder blade only. 3. Relax your arms back to where you started. 4. Repeat 8 to 12 times. Arm raise to the side    Note: During this strengthening exercise, your arm should stay about 30 degrees to the front of your side.   1. Slowly raise your injured arm to the side, with your thumb facing up. Raise your arm 60 degrees at the most (shoulder level is 90 degrees). 2. Hold the position for 3 to 5 seconds. Then lower your arm back to your side. If you need to, bring your \"good\" arm across your body and place it under the elbow as you lower your injured arm. Use your good arm to keep your injured arm from dropping down too fast.  3. Repeat 8 to 12 times. 4. When you first start out, don't hold any extra weight in your hand. As you get stronger, you may use a 1-pound to 2-pound dumbbell or a small can of food. Shoulder flexor and extensor exercise    Note: These are isometric exercises. That means you contract your muscles without actually moving. · Push forward (flex): Stand facing a wall or doorjamb, about 6 inches or less back. Hold your injured arm against your body. Make a closed fist with your thumb on top. Then gently push your hand forward into the wall with about 25% to 50% of your strength. Don't let your body move backward as you push. Hold for about 6 seconds. Relax for a few seconds. Repeat 8 to 12 times. · Push backward (extend): Stand with your back flat against a wall. Your upper arm should be against the wall, with your elbow bent 90 degrees (your hand straight ahead). Push your elbow gently back against the wall with about 25% to 50% of your strength. Don't let your body move forward as you push. Hold for about 6 seconds. Relax for a few seconds. Repeat 8 to 12 times. Scapular exercise: Wall push-ups    Note: This exercise is best done with your fingers somewhat turned out, rather than straight up and down. 1. Stand facing a wall, about 12 inches to 18 inches away. 2. Place your hands on the wall at shoulder height. 3. Slowly bend your elbows and bring your face to the wall. Keep your back and hips straight. 4. Push back to where you started. 5. Repeat 8 to 12 times. 6. When you can do this exercise against a wall comfortably, you can try it against a counter.  You can then slowly progress to the end of a couch, then to a sturdy chair, and finally to the floor. Scapular exercise: Retraction    Note: For this exercise, you will need elastic exercise material, such as surgical tubing or Thera-Band. 1. Put the band around a solid object at about waist level. (A bedpost will work well.) Each hand should hold an end of the band. 2. With your elbows at your sides and bent to 90 degrees, pull the band back. Your shoulder blades should move toward each other. Then move your arms back where you started. 3. Repeat 8 to 12 times. 4. If you have good range of motion in your shoulders, try this exercise with your arms lifted out to the sides. Keep your elbows at a 90-degree angle. Raise the elastic band up to about shoulder level. Pull the band back to move your shoulder blades toward each other. Then move your arms back where you started. Internal rotator strengthening exercise    1. Start by tying a piece of elastic exercise material to a doorknob. You can use surgical tubing or Thera-Band. 2. Stand or sit with your shoulder relaxed and your elbow bent 90 degrees. Your upper arm should rest comfortably against your side. Squeeze a rolled towel between your elbow and your body for comfort. This will help keep your arm at your side. 3. Hold one end of the elastic band in the hand of the painful arm. 4. Slowly rotate your forearm toward your body until it touches your belly. Slowly move it back to where you started. 5. Keep your elbow and upper arm firmly tucked against the towel roll or at your side. 6. Repeat 8 to 12 times. External rotator strengthening exercise    1. Start by tying a piece of elastic exercise material to a doorknob. You can use surgical tubing or Thera-Band. (You may also hold one end of the band in each hand.)  2. Stand or sit with your shoulder relaxed and your elbow bent 90 degrees. Your upper arm should rest comfortably against your side.  Squeeze a rolled towel between your elbow and your body for comfort. This will help keep your arm at your side. 3. Hold one end of the elastic band with the hand of the painful arm. 4. Start with your forearm across your belly. Slowly rotate the forearm out away from your body. Keep your elbow and upper arm tucked against the towel roll or the side of your body until you begin to feel tightness in your shoulder. Slowly move your arm back to where you started. 5. Repeat 8 to 12 times. Follow-up care is a key part of your treatment and safety. Be sure to make and go to all appointments, and call your doctor if you are having problems. It's also a good idea to know your test results and keep a list of the medicines you take. Where can you learn more? Go to http://bhargavi-sagar.info/. Enter Farida Zapata in the search box to learn more about \"Rotator Cuff: Exercises. \"  Current as of: May 23, 2016  Content Version: 11.2  © 1301-9819 [employer]. Care instructions adapted under license by [employer] (which disclaims liability or warranty for this information). If you have questions about a medical condition or this instruction, always ask your healthcare professional. [address] any warranty or liability for your use of this information.

## 2017-06-07 NOTE — PROGRESS NOTES
Patient received paperwork for advance directive during previous visit but has not completed at this time     Reviewed record In preparation for visit and have obtained necessary documentation      1. Have you been to the ER, urgent care clinic since your last visit? Hospitalized since your last visit? NO    2. Have you seen or consulted any other health care providers outside of the [address] [place] [address] since your last visit? Include any pap smears or colon screening.  NO

## 2017-06-15 ENCOUNTER — TELEPHONE (OUTPATIENT)
Dept: INTERNAL MEDICINE CLINIC | Age: 72
End: 2017-06-15

## 2017-06-15 DIAGNOSIS — G89.29 CHRONIC RIGHT SHOULDER PAIN: Primary | ICD-10-CM

## 2017-06-15 DIAGNOSIS — M25.511 CHRONIC RIGHT SHOULDER PAIN: Primary | ICD-10-CM

## 2017-07-05 ENCOUNTER — PATIENT MESSAGE (OUTPATIENT)
Dept: INTERNAL MEDICINE CLINIC | Age: 72
End: 2017-07-05

## 2017-07-06 RX ORDER — LANCETS
EACH MISCELLANEOUS
Qty: 120 EACH | Refills: 4 | Status: SHIPPED | OUTPATIENT
Start: 2017-07-06 | End: 2017-12-05 | Stop reason: SDUPTHER

## 2017-07-06 NOTE — TELEPHONE ENCOUNTER
----- Message from Shaq Recinos LPN sent at 0/1/1545  1:28 PM EDT -----  Regarding: FW: Prescription Question  Contact: [phone]      ----- Message -----     From: Chilango Mccormack     Sent: 7/5/2017  12:20 PM       To: Jeimy Torres  Subject: Prescription Question                            I have changed my prescriptions from [facility] in [place] to [facility] in [place]. This morning I tried to get refills on my diabetic test strips and lancets. [facility] until they receive a DWO form from my physician. Please send the forms to the [facility] as soon as possible.     Thank you

## 2017-07-21 ENCOUNTER — TELEPHONE (OUTPATIENT)
Dept: INTERNAL MEDICINE CLINIC | Age: 72
End: 2017-07-21

## 2017-07-21 NOTE — TELEPHONE ENCOUNTER
Pt states pharmacy still isn't able to refill her request for lancets because they have NOT received the DWO form from PCP  Pt would like return call to discuss status of having lancets refilled, may be reached at [phone]

## 2017-07-21 NOTE — TELEPHONE ENCOUNTER
Form faxed again and called [facility] to confirm receipt. They do have it and will fill. Pt notified.

## 2017-08-22 ENCOUNTER — APPOINTMENT (OUTPATIENT)
Dept: INTERNAL MEDICINE CLINIC | Age: 72
End: 2017-08-22

## 2017-08-22 ENCOUNTER — HOSPITAL ENCOUNTER (OUTPATIENT)
Dept: LAB | Age: 72
Discharge: HOME | End: 2017-08-22
Payer: MEDICARE

## 2017-08-22 DIAGNOSIS — E78.5 HYPERLIPIDEMIA, UNSPECIFIED HYPERLIPIDEMIA TYPE: ICD-10-CM

## 2017-08-22 DIAGNOSIS — Z79.4 TYPE 2 DIABETES MELLITUS WITHOUT COMPLICATION, WITH LONG-TERM CURRENT USE OF INSULIN (HCC): ICD-10-CM

## 2017-08-22 DIAGNOSIS — E11.9 TYPE 2 DIABETES MELLITUS WITHOUT COMPLICATION, WITH LONG-TERM CURRENT USE OF INSULIN (HCC): ICD-10-CM

## 2017-08-22 PROCEDURE — 83036 HEMOGLOBIN GLYCOSYLATED A1C: CPT

## 2017-08-22 PROCEDURE — 80061 LIPID PANEL: CPT

## 2017-08-22 PROCEDURE — 36415 COLL VENOUS BLD VENIPUNCTURE: CPT

## 2017-08-22 PROCEDURE — 80053 COMPREHEN METABOLIC PANEL: CPT

## 2017-08-23 LAB
ALBUMIN SERPL-MCNC: 4.3 G/DL (ref 3.5–4.8)
ALBUMIN/GLOB SERPL: 2.2 {RATIO} (ref 1.2–2.2)
ALP SERPL-CCNC: 49 IU/L (ref 39–117)
ALT SERPL-CCNC: 15 IU/L (ref 0–32)
AST SERPL-CCNC: 17 IU/L (ref 0–40)
BILIRUB SERPL-MCNC: 0.4 MG/DL (ref 0–1.2)
BUN SERPL-MCNC: 18 MG/DL (ref 8–27)
BUN/CREAT SERPL: 25 (ref 12–28)
CALCIUM SERPL-MCNC: 9.4 MG/DL (ref 8.7–10.3)
CHLORIDE SERPL-SCNC: 100 MMOL/L (ref 96–106)
CHOLEST SERPL-MCNC: 176 MG/DL (ref 100–199)
CO2 SERPL-SCNC: 27 MMOL/L (ref 18–29)
CREAT SERPL-MCNC: 0.71 MG/DL (ref 0.57–1)
EST. AVERAGE GLUCOSE BLD GHB EST-MCNC: 151 MG/DL
GLOBULIN SER CALC-MCNC: 2 G/DL (ref 1.5–4.5)
GLUCOSE SERPL-MCNC: 131 MG/DL (ref 65–99)
HBA1C MFR BLD: 6.9 % (ref 4.8–5.6)
HDLC SERPL-MCNC: 71 MG/DL
INTERPRETATION, 910389: NORMAL
LDLC SERPL CALC-MCNC: 90 MG/DL (ref 0–99)
Lab: NORMAL
POTASSIUM SERPL-SCNC: 4.5 MMOL/L (ref 3.5–5.2)
PROT SERPL-MCNC: 6.3 G/DL (ref 6–8.5)
SODIUM SERPL-SCNC: 139 MMOL/L (ref 134–144)
TRIGL SERPL-MCNC: 73 MG/DL (ref 0–149)
VLDLC SERPL CALC-MCNC: 15 MG/DL (ref 5–40)

## 2017-08-29 ENCOUNTER — OFFICE VISIT (OUTPATIENT)
Dept: INTERNAL MEDICINE CLINIC | Age: 72
End: 2017-08-29

## 2017-08-29 ENCOUNTER — HOSPITAL ENCOUNTER (OUTPATIENT)
Dept: LAB | Age: 72
Discharge: HOME | End: 2017-08-29
Payer: MEDICARE

## 2017-08-29 VITALS
RESPIRATION RATE: 16 BRPM | TEMPERATURE: 98 F | SYSTOLIC BLOOD PRESSURE: 116 MMHG | BODY MASS INDEX: 24.2 KG/M2 | DIASTOLIC BLOOD PRESSURE: 68 MMHG | HEIGHT: 66 IN | OXYGEN SATURATION: 97 % | HEART RATE: 84 BPM | WEIGHT: 150.6 LBS

## 2017-08-29 DIAGNOSIS — Z23 ENCOUNTER FOR IMMUNIZATION: ICD-10-CM

## 2017-08-29 DIAGNOSIS — Z79.4 TYPE 2 DIABETES MELLITUS WITHOUT COMPLICATION, WITH LONG-TERM CURRENT USE OF INSULIN (HCC): Primary | ICD-10-CM

## 2017-08-29 DIAGNOSIS — E11.9 TYPE 2 DIABETES MELLITUS WITHOUT COMPLICATION, WITH LONG-TERM CURRENT USE OF INSULIN (HCC): Primary | ICD-10-CM

## 2017-08-29 DIAGNOSIS — E78.5 HYPERLIPIDEMIA, UNSPECIFIED HYPERLIPIDEMIA TYPE: ICD-10-CM

## 2017-08-29 DIAGNOSIS — R53.83 FATIGUE, UNSPECIFIED TYPE: ICD-10-CM

## 2017-08-29 LAB
ALBUMIN UR QL STRIP: 10 MG/L
CREATININE, URINE POC: 300 MG/DL
MICROALBUMIN/CREAT RATIO POC: <30 MG/G

## 2017-08-29 PROCEDURE — 85025 COMPLETE CBC W/AUTO DIFF WBC: CPT

## 2017-08-29 PROCEDURE — 84439 ASSAY OF FREE THYROXINE: CPT

## 2017-08-29 PROCEDURE — 36415 COLL VENOUS BLD VENIPUNCTURE: CPT

## 2017-08-29 PROCEDURE — 84443 ASSAY THYROID STIM HORMONE: CPT

## 2017-08-29 NOTE — MR AVS SNAPSHOT
Visit Information Date & Time Provider Department Dept. Phone Encounter #  
 8/29/2017  8:30 AM Young Bagley MD Josef Guerrero [phone] [id number] Follow-up Instructions Return in about 6 months (around 2/28/2018) for bp, chol, dm, labs prior. Upcoming Health Maintenance Date Due MICROALBUMIN Q1 2/24/2017 INFLUENZA AGE 9 TO ADULT 8/1/2017 EYE EXAM RETINAL OR DILATED Q1 1/16/2018 HEMOGLOBIN A1C Q6M 2/22/2018 FOOT EXAM Q1 2/27/2018 MEDICARE YEARLY EXAM 2/28/2018 LIPID PANEL Q1 8/22/2018 GLAUCOMA SCREENING Q2Y 1/16/2019 BREAST CANCER SCRN MAMMOGRAM 3/31/2019 COLONOSCOPY 11/16/2021 DTaP/Tdap/Td series (2 - Td) 7/9/2023 Allergies as of 8/29/2017  Review Complete On: 8/29/2017 By: Young Bagley MD  
  
 Severity Noted Reaction Type Reactions Lisinopril  01/09/2013    Cough Simvastatin  08/24/2016    Myalgia Current Immunizations  Reviewed on 2/27/2017 Name Date Hepatitis A Vaccine 5/7/2012, 1/1/2003 Hepatitis B Vaccine 1/1/2003 IPV 5/7/2012, 12/1/2004 Influenza High Dose Vaccine PF 8/29/2017, 9/4/2015 Influenza Vaccine 10/30/2013 Influenza Vaccine Split 11/4/2010 Influenza Vaccine Whole 11/21/2012 Pneumococcal Conjugate (PCV-13) 9/4/2015 TD Vaccine 10/1/2004 Tdap 7/9/2013 ZZZ-RETIRED (DO NOT USE) Pneumococcal Vaccine (Unspecified Type) 1/1/2009 Zoster 1/1/2009 Not reviewed this visit You Were Diagnosed With   
  
 Codes Comments Type 2 diabetes mellitus without complication, with long-term current use of insulin (HCC)    -  Primary ICD-10-CM: E11.9, Z79.4 ICD-9-CM: 250.00, V58.67 Encounter for immunization     ICD-10-CM: C47 ICD-9-CM: V03.89 Fatigue, unspecified type     ICD-10-CM: R53.83 ICD-9-CM: 780.79 Hyperlipidemia, unspecified hyperlipidemia type     ICD-10-CM: E78.5 ICD-9-CM: 272.4 Vitals BP Pulse Temp Resp Height(growth percentile) Weight(growth percentile) 116/68 (BP 1 Location: Right arm, BP Patient Position: Sitting) 84 98 °F (36.7 °C) (Oral) 16 5' 6\" (1.676 m) 150 lb 9.6 oz (68.3 kg) SpO2 BMI OB Status Smoking Status 97% 24.31 kg/m2 Hysterectomy Never Smoker Vitals History BMI and BSA Data Body Mass Index Body Surface Area  
 24.31 kg/m 2 1.78 m 2 Preferred Pharmacy Pharmacy Name Phone [phone], [address] [phone] Your Updated Medication List  
  
   
This list is accurate as of: 8/29/17  9:09 AM.  Always use your most recent med list.  
  
  
  
  
 BD INSULIN SYRINGE ULTRA-FINE 0.3 mL 31 gauge x 5/16 Syrg Generic drug:  Insulin Syringe-Needle U-100  
USE ONE (1) TWICE DAILY CALTRATE 600+D PLUS MINERALS 600 mg (1,500 mg)-400 unit Chew Generic drug:  Calcium Carbonate-Vit D3-Min Take  by mouth daily. CENTRUM SILVER Tab tablet Generic drug:  multivitamins-minerals-lutein Take  by mouth. glucose blood VI test strips strip Commonly known as:  ONETOUCH ULTRA TEST  
USE TO TEST BLOOD SUGAR THREE TIMES A DAY DX E11.9 Lancets [facility] Commonly known as:  ONETOUCH ULTRASOFT LANCETS  
TEST FOUR TIMES DAILY . Betha Lute Betha Lute .02  
  
 LANTUS 100 unit/mL injection Generic drug:  insulin glargine INJECT 11 UNITS EVERY MORNING AND 3 UNITS EVERY EVENING FOR DIABETES CONTROL  
  
 losartan 50 mg tablet Commonly known as:  COZAAR  
TAKE ONE TABLET BY MOUTH DAILY  
  
 metFORMIN 500 mg tablet Commonly known as:  GLUCOPHAGE  
TAKE TWO (2) TABLET(S) EAC H MORNING, TAKE ONE TABLET AT NOON, AND TAKE ONE TABLE T WITH DINNER  
  
 spironolactone 50 mg tablet Commonly known as:  ALDACTONE  
take 1 tablet by mouth once daily VITAMIN D3 1,000 unit tablet Generic drug:  cholecalciferol Take  by mouth daily. We Performed the Following ADMIN INFLUENZA VIRUS VAC [ HCP] AMB POC URINE, MICROALBUMIN, SEMIQUANT (3 RESULTS) [44518 CPT(R)] CBC WITH AUTOMATED DIFF [05950 CPT(R)] INFLUENZA VIRUS VACCINE, HIGH DOSE SEASONAL, PRESERVATIVE FREE [49577 CPT(R)] T4, FREE [id number] CPT(R)] TSH 3RD GENERATION [43115 CPT(R)] Follow-up Instructions Return in about 6 months (around 2/28/2018) for bp, chol, dm, labs prior. To-Do List   
 02/25/2018 Lab:  HEMOGLOBIN A1C WITH EAG   
  
 02/25/2018 Lab:  LIPID PANEL   
  
 02/25/2018 Lab:  METABOLIC PANEL, COMPREHENSIVE Patient Instructions Vaccine Information Statement Influenza (Flu) Vaccine (Inactivated or Recombinant): What you need to know Many Vaccine Information Statements are available in [place] and other languages. See www.immunize.org/vis Hojas de Información Sobre Vacunas están disponibles en Español y en muchos otros idiomas. Visite www.immunize.org/vis 1. Why get vaccinated? Influenza (flu) is a contagious disease that spreads around the [place] every year, usually between October and May. Flu is caused by influenza viruses, and is spread mainly by coughing, sneezing, and close contact. Anyone can get flu. Flu strikes suddenly and can last several days. Symptoms vary by age, but can include: 
 fever/chills  sore throat  muscle aches  fatigue  cough  headache  runny or stuffy nose Flu can also lead to pneumonia and blood infections, and cause diarrhea and seizures in children. If you have a medical condition, such as heart or lung disease, flu can make it worse. Flu is more dangerous for some people. Infants and young children, people 72years of age and older, pregnant women, and people with certain health conditions or a weakened immune system are at greatest risk. Each year thousands of people in the [facility] die from flu, and many more are hospitalized. Flu vaccine can:  keep you from getting flu, 
 make flu less severe if you do get it, and 
 keep you from spreading flu to your family and other people. 2. Inactivated and recombinant flu vaccines A dose of flu vaccine is recommended every flu season. Children 6 months through 6years of age may need two doses during the same flu season. Everyone else needs only one dose each flu season. Some inactivated flu vaccines contain a very small amount of a mercury-based preservative called thimerosal. Studies have not shown thimerosal in vaccines to be harmful, but flu vaccines that do not contain thimerosal are available. There is no live flu virus in flu shots. They cannot cause the flu. There are many flu viruses, and they are always changing. Each year a new flu vaccine is made to protect against three or four viruses that are likely to cause disease in the upcoming flu season. But even when the vaccine doesnt exactly match these viruses, it may still provide some protection Flu vaccine cannot prevent: 
 flu that is caused by a virus not covered by the vaccine, or 
 illnesses that look like flu but are not. It takes about 2 weeks for protection to develop after vaccination, and protection lasts through the flu season. 3. Some people should not get this vaccine Tell the person who is giving you the vaccine:  If you have any severe, life-threatening allergies. If you ever had a life-threatening allergic reaction after a dose of flu vaccine, or have a severe allergy to any part of this vaccine, you may be advised not to get vaccinated. Most, but not all, types of flu vaccine contain a small amount of egg protein.  If you ever had Guillain-Barré Syndrome (also called GBS). Some people with a history of GBS should not get this vaccine. This should be discussed with your doctor.  If you are not feeling well. It is usually okay to get flu vaccine when you have a mild illness, but you might be asked to come back when you feel better. 4. Risks of a vaccine reaction With any medicine, including vaccines, there is a chance of reactions. These are usually mild and go away on their own, but serious reactions are also possible. Most people who get a flu shot do not have any problems with it. Minor problems following a flu shot include:  
 soreness, redness, or swelling where the shot was given  hoarseness  sore, red or itchy eyes  cough  fever  aches  headache  itching  fatigue If these problems occur, they usually begin soon after the shot and last 1 or 2 days. More serious problems following a flu shot can include the following:  There may be a small increased risk of Guillain-Barré Syndrome (GBS) after inactivated flu vaccine. This risk has been estimated at 1 or 2 additional cases per million people vaccinated. This is much lower than the risk of severe complications from flu, which can be prevented by flu vaccine.  Young children who get the flu shot along with pneumococcal vaccine (PCV13) and/or DTaP vaccine at the same time might be slightly more likely to have a seizure caused by fever. Ask your doctor for more information. Tell your doctor if a child who is getting flu vaccine has ever had a seizure. Problems that could happen after any injected vaccine:  People sometimes faint after a medical procedure, including vaccination. Sitting or lying down for about 15 minutes can help prevent fainting, and injuries caused by a fall. Tell your doctor if you feel dizzy, or have vision changes or ringing in the ears.  Some people get severe pain in the shoulder and have difficulty moving the arm where a shot was given. This happens very rarely.  Any medication can cause a severe allergic reaction.  Such reactions from a vaccine are very rare, estimated at about 1 in a million doses, and would happen within a few minutes to a few hours after the vaccination. As with any medicine, there is a very remote chance of a vaccine causing a serious injury or death. The safety of vaccines is always being monitored. For more information, visit: www.cdc.gov/vaccinesafety/ 
 
5. What if there is a serious reaction? What should I look for?  Look for anything that concerns you, such as signs of a severe allergic reaction, very high fever, or unusual behavior. Signs of a severe allergic reaction can include hives, swelling of the face and throat, difficulty breathing, a fast heartbeat, dizziness, and weakness  usually within a few minutes to a few hours after the vaccination. What should I do?  If you think it is a severe allergic reaction or other emergency that cant wait, call 9-1-1 and get the person to the nearest hospital. Otherwise, call your doctor.  Reactions should be reported to the Vaccine Adverse Event Reporting System (VAERS). Your doctor should file this report, or you can do it yourself through  the VAERS web site at www.vaers. [facility].gov, or by calling 2-623.517.6260. VAERS does not give medical advice. 6. The National Vaccine Injury Compensation Program 
 
The [facility] [place] Vaccine Injury Compensation Program (VICP) is a federal program that was created to compensate people who may have been injured by certain vaccines. Persons who believe they may have been injured by a vaccine can learn about the program and about filing a claim by calling 3-732.564.2376 or visiting the [employer] website at www.[facility].gov/vaccinecompensation. There is a time limit to file a claim for compensation. 7. How can I learn more?  Ask your healthcare provider. He or she can give you the vaccine package insert or suggest other sources of information.  Call your local or state health department.  Contact the Centers for Disease Control and Prevention (CDC): 
- Call 8-761.556.2665 (1-800-CDC-INFO) or 
- Visit CDCs website at www.cdc.gov/flu Vaccine Information Statement Inactivated Influenza Vaccine 8/7/2015 
42 BRIAN Partida [id number] Department of [facility] Centers for Disease Control and Prevention Office Use Only Lists of hospitals in the [place] & HEALTH SERVICES! Dear Pearl Kingsley: Thank you for requesting a [employer] account. Our records indicate that you already have an active [employer] account. You can access your account anytime at [email or web address] [email or web address] Did you know that you can access your hospital and ER discharge instructions at any time in [employer]? You can also review all of your test results from your hospital stay or ER visit. Additional Information If you have questions, please visit the Frequently Asked Questions section of the [employer] website at [email or web address] Remember, [employer] is NOT to be used for urgent needs. For medical emergencies, dial 911. Now available from your iPhone and Android! Please provide this summary of care documentation to your next provider. Your primary care clinician is listed as Drea Grace. If you have any questions after today's visit, please call [phone].

## 2017-08-29 NOTE — PROGRESS NOTES
HPI  Ms. Diogenes Knight is a 74y.o. year old female, she is seen today for follow up HTN, cholesterol, DM. Has been more conscious of eating habits - eating more slowly. Has been making better food choices. Wants to have more energy. Goes to pool couple of times of week for exercise. No cp, sob, dizziness, melena or brpbr. Hair thin. Feels better after exercising. Exercise doesn't make her more tired. Normally sleeps well at night. Often naps in afternoon which helps fatigue. Gets at least 7 hours of sleep at night. Checks glucose 3-4 x per day - if any lows they are first thing in am - in 70s - nothing less than 70 in 6 mos. Highest was 300 which is unusual. Normally before meals may be 100-120. Didn't bring log today. No abdominal pain, indigestion or heartburn. No edema. Sleeps on one pillow, no PND. Chief Complaint   Patient presents with    Blood Pressure Check     Room 3// NON fasting     Cholesterol Problem    Diabetes        Prior to Admission medications    Medication Sig Start Date End Date Taking? Authorizing Provider   spironolactone (ALDACTONE) 50 mg tablet take 1 tablet by mouth once daily 8/4/17  Yes Dustin Crane MD   glucose blood VI test strips (ONETOUCH ULTRA TEST) strip USE TO TEST BLOOD SUGAR THREE TIMES A DAY DX E11.9 7/6/17  Yes Dustin Crane MD   Lancets (ONETOUCH ULTRASOFT LANCETS) misc TEST FOUR TIMES DAILY . Mckenna Mauricio .02 7/6/17  Yes Dustin Crane MD   LANTUS 100 unit/mL injection INJECT 11 UNITS EVERY MORNING AND 3 UNITS EVERY EVENING FOR DIABETES CONTROL 5/31/17  Yes Dustin Crane MD   losartan (COZAAR) 50 mg tablet TAKE ONE TABLET BY MOUTH DAILY 4/5/17  Yes Dustin Crane MD   BD INSULIN SYRINGE ULTRA-FINE 0.3 mL 31 gauge x 5/16 syrg USE ONE (1) TWICE DAILY 10/31/16  Yes Dustin Crane MD   metFORMIN (GLUCOPHAGE) 500 mg tablet TAKE TWO (2) TABLET(S) EAC H MORNING, TAKE ONE TABLET AT NOON, AND TAKE ONE TABLE T WITH DINNER 9/19/16 Yes Vaughn Childers MD   Calcium Carbonate-Vit D3-Min (CALTRATE 600+D PLUS MINERALS) 600 mg (1,500 mg)-400 unit Chew Take  by mouth daily. 6/27/12  Yes Danita Tello MD   multivitamins-minerals-lutein (CENTRUM SILVER) Tab Take  by mouth. Yes Historical Provider   cholecalciferol, vitamin d3, (VITAMIN D) 1,000 unit tablet Take  by mouth daily. Yes Historical Provider         Allergies   Allergen Reactions    Lisinopril Cough    Simvastatin Myalgia         REVIEW OF SYSTEMS:  Per HPI    PHYSICAL EXAM:  Visit Vitals    /68 (BP 1 Location: Right arm, BP Patient Position: Sitting)    Pulse 84    Temp 98 °F (36.7 °C) (Oral)    Resp 16    Ht 5' 6\" (1.676 m)    Wt 150 lb 9.6 oz (68.3 kg)    SpO2 97%    BMI 24.31 kg/m2     Constitutional: Appears well-developed and well-nourished. No distress. HENT:   Head: Normocephalic and atraumatic. Hair thinning. Eyes: No scleral icterus. Neck: no lad, no tm, supple   Cardiovascular: Normal S1/S2, regular rhythm. No murmurs, rubs, or gallops. Pulmonary/Chest: Effort normal and breath sounds normal. No respiratory distress. No wheezes, rhonchi, or rales. Abdomen: Soft, NT/ND, +BS, no rebound or guarding, no masses, no HSM appreciated. Ext: No edema. Neurological: Alert. Psychiatric: Normal mood and affect. Behavior is normal.     Lab Results   Component Value Date/Time    Sodium 139 08/22/2017 08:11 AM    Potassium 4.5 08/22/2017 08:11 AM    Chloride 100 08/22/2017 08:11 AM    CO2 27 08/22/2017 08:11 AM    Anion gap 9 11/01/2010 08:07 AM    Glucose 131 08/22/2017 08:11 AM    BUN 18 08/22/2017 08:11 AM    Creatinine 0.71 08/22/2017 08:11 AM    BUN/Creatinine ratio 25 08/22/2017 08:11 AM    GFR est AA 97 08/22/2017 08:11 AM    GFR est non-AA 84 08/22/2017 08:11 AM    Calcium 9.4 08/22/2017 08:11 AM    Bilirubin, total 0.4 08/22/2017 08:11 AM    AST (SGOT) 17 08/22/2017 08:11 AM    Alk.  phosphatase 49 08/22/2017 08:11 AM    Protein, total 6.3 08/22/2017 08:11 AM    Albumin 4.3 08/22/2017 08:11 AM    Globulin 2.4 11/01/2010 08:07 AM    A-G Ratio 2.2 08/22/2017 08:11 AM    ALT (SGPT) 15 08/22/2017 08:11 AM     Lab Results   Component Value Date/Time    Hemoglobin A1c 6.9 08/22/2017 08:11 AM    Hemoglobin A1c 7.3 02/20/2017 09:28 AM    Hemoglobin A1c 7.0 08/17/2016 10:34 AM      Lab Results   Component Value Date/Time    Cholesterol, total 176 08/22/2017 08:11 AM    HDL Cholesterol 71 08/22/2017 08:11 AM    LDL, calculated 90 08/22/2017 08:11 AM    VLDL, calculated 15 08/22/2017 08:11 AM    Triglyceride 73 08/22/2017 08:11 AM    CHOL/HDL Ratio 1.5 11/01/2010 08:07 AM          ASSESSMENT/PLAN  Diagnoses and all orders for this visit:    1. Type 2 diabetes mellitus without complication, with long-term current use of insulin (HCC)  -     AMB POC URINE, MICROALBUMIN, SEMIQUANT (3 RESULTS)  -     METABOLIC PANEL, COMPREHENSIVE; Future  -     HEMOGLOBIN A1C WITH EAG; Future    2. Encounter for immunization  -     Influenza virus vaccine ([address]) 72 years and older (36823)  -     Administration fee () for Medicare insured patients    3. Fatigue, unspecified type  -     TSH 3RD GENERATION  -     T4, FREE  -     CBC WITH AUTOMATED DIFF    4. Hyperlipidemia, unspecified hyperlipidemia type  -     LIPID PANEL; Future  LDL higher - but still close to goal        Health Maintenance Due   Topic Date Due    MICROALBUMIN Q1  02/24/2017    INFLUENZA AGE 9 TO ADULT  08/01/2017        Follow-up Disposition:  Return in about 6 months (around 2/28/2018) for bp, chol, dm, labs prior. Reviewed plan of care. Patient has provided input and agrees with goals. The nurse provided the patient and/or family with advanced directive information if needed and encouraged the patient to provide a copy to the office when available.

## 2017-08-29 NOTE — PROGRESS NOTES
Carolyn Cowan  Identified pt with two pt identifiers(name and ). Chief Complaint   Patient presents with    Blood Pressure Check     Room 3// NON fasting     Cholesterol Problem    Diabetes       1. Have you been to the ER, urgent care clinic since your last visit? Hospitalized since your last visit? NO    2. Have you seen or consulted any other health care providers outside of the [address] [place] [address] since your last visit? Include any pap smears or colon screening.  NO      Dr Ameena Garrido notified of reason for visit and vitals    Patient received paperwork for advance directive during previous visit but has not completed at this time     Reviewed record In preparation for visit, huddled with provider and have obtained necessary documentation      Health Maintenance Due   Topic    MICROALBUMIN Q1     INFLUENZA AGE 9 TO ADULT        Wt Readings from Last 3 Encounters:   17 150 lb 9.6 oz (68.3 kg)   17 152 lb (68.9 kg)   17 150 lb 9.6 oz (68.3 kg)     Temp Readings from Last 3 Encounters:   17 97.6 °F (36.4 °C) (Oral)   17 98.2 °F (36.8 °C) (Oral)   16 97.2 °F (36.2 °C)     BP Readings from Last 3 Encounters:   17 136/78   17 119/78   16 135/72     Pulse Readings from Last 3 Encounters:   17 73   17 82   16 60         Learning Assessment:  :     Learning Assessment 2016   PRIMARY LEARNER Patient Patient   HIGHEST LEVEL OF EDUCATION - PRIMARY LEARNER  > 4 YEARS OF COLLEGE -   BARRIERS PRIMARY LEARNER NONE -   Lonia Bunting CAREGIVER No -   PRIMARY LANGUAGE ENGLISH ENGLISH   LEARNER PREFERENCE PRIMARY READING DEMONSTRATION   ANSWERED BY Patient patient   RELATIONSHIP SELF SELF       Depression Screening:  :     PHQ over the last two weeks 2016   Little interest or pleasure in doing things Not at all   Feeling down, depressed or hopeless Not at all   Total Score PHQ 2 0       Fall Risk Assessment:  :     Fall Risk Assessment, last 12 mths 8/31/2016   Able to walk? Yes   Fall in past 12 months? No       Abuse Screening:  :     Abuse Screening Questionnaire 8/24/2016 2/10/2015   Do you ever feel afraid of your partner? N N   Are you in a relationship with someone who physically or mentally threatens you? N N   Is it safe for you to go home? Renee Iyer I have received verbal consent from Krysta Sinclair to discuss any/all medical information while they are present in the room. Medication reconciliation up to date and corrected with patient at this time.

## 2017-08-29 NOTE — PATIENT INSTRUCTIONS
Vaccine Information Statement    Influenza (Flu) Vaccine (Inactivated or Recombinant): What you need to know    Many Vaccine Information Statements are available in Faroese and other languages. See www.immunize.org/vis  Hojas de Información Sobre Vacunas están disponibles en Español y en muchos otros idiomas. Visite www.immunize.org/vis    1. Why get vaccinated? Influenza (flu) is a contagious disease that spreads around the [place] every year, usually between October and May. Flu is caused by influenza viruses, and is spread mainly by coughing, sneezing, and close contact. Anyone can get flu. Flu strikes suddenly and can last several days. Symptoms vary by age, but can include:   fever/chills   sore throat   muscle aches   fatigue   cough   headache    runny or stuffy nose    Flu can also lead to pneumonia and blood infections, and cause diarrhea and seizures in children. If you have a medical condition, such as heart or lung disease, flu can make it worse. Flu is more dangerous for some people. Infants and young children, people 72years of age and older, pregnant women, and people with certain health conditions or a weakened immune system are at greatest risk. Each year thousands of people in the [facility] die from flu, and many more are hospitalized. Flu vaccine can:   keep you from getting flu,   make flu less severe if you do get it, and   keep you from spreading flu to your family and other people. 2. Inactivated and recombinant flu vaccines    A dose of flu vaccine is recommended every flu season. Children 6 months through 6years of age may need two doses during the same flu season. Everyone else needs only one dose each flu season.        Some inactivated flu vaccines contain a very small amount of a mercury-based preservative called thimerosal. Studies have not shown thimerosal in vaccines to be harmful, but flu vaccines that do not contain thimerosal are available. There is no live flu virus in flu shots. They cannot cause the flu. There are many flu viruses, and they are always changing. Each year a new flu vaccine is made to protect against three or four viruses that are likely to cause disease in the upcoming flu season. But even when the vaccine doesnt exactly match these viruses, it may still provide some protection    Flu vaccine cannot prevent:   flu that is caused by a virus not covered by the vaccine, or   illnesses that look like flu but are not. It takes about 2 weeks for protection to develop after vaccination, and protection lasts through the flu season. 3. Some people should not get this vaccine    Tell the person who is giving you the vaccine:     If you have any severe, life-threatening allergies. If you ever had a life-threatening allergic reaction after a dose of flu vaccine, or have a severe allergy to any part of this vaccine, you may be advised not to get vaccinated. Most, but not all, types of flu vaccine contain a small amount of egg protein.  If you ever had Guillain-Barré Syndrome (also called GBS). Some people with a history of GBS should not get this vaccine. This should be discussed with your doctor.  If you are not feeling well. It is usually okay to get flu vaccine when you have a mild illness, but you might be asked to come back when you feel better. 4. Risks of a vaccine reaction    With any medicine, including vaccines, there is a chance of reactions. These are usually mild and go away on their own, but serious reactions are also possible. Most people who get a flu shot do not have any problems with it.      Minor problems following a flu shot include:    soreness, redness, or swelling where the shot was given     hoarseness   sore, red or itchy eyes   cough   fever   aches   headache   itching   fatigue  If these problems occur, they usually begin soon after the shot and last 1 or 2 days. More serious problems following a flu shot can include the following:     There may be a small increased risk of Guillain-Barré Syndrome (GBS) after inactivated flu vaccine. This risk has been estimated at 1 or 2 additional cases per million people vaccinated. This is much lower than the risk of severe complications from flu, which can be prevented by flu vaccine.  Young children who get the flu shot along with pneumococcal vaccine (PCV13) and/or DTaP vaccine at the same time might be slightly more likely to have a seizure caused by fever. Ask your doctor for more information. Tell your doctor if a child who is getting flu vaccine has ever had a seizure. Problems that could happen after any injected vaccine:      People sometimes faint after a medical procedure, including vaccination. Sitting or lying down for about 15 minutes can help prevent fainting, and injuries caused by a fall. Tell your doctor if you feel dizzy, or have vision changes or ringing in the ears.  Some people get severe pain in the shoulder and have difficulty moving the arm where a shot was given. This happens very rarely.  Any medication can cause a severe allergic reaction. Such reactions from a vaccine are very rare, estimated at about 1 in a million doses, and would happen within a few minutes to a few hours after the vaccination. As with any medicine, there is a very remote chance of a vaccine causing a serious injury or death. The safety of vaccines is always being monitored. For more information, visit: www.cdc.gov/vaccinesafety/    5. What if there is a serious reaction? What should I look for?  Look for anything that concerns you, such as signs of a severe allergic reaction, very high fever, or unusual behavior.     Signs of a severe allergic reaction can include hives, swelling of the face and throat, difficulty breathing, a fast heartbeat, dizziness, and weakness - usually within a few minutes to a few hours after the vaccination. What should I do?  If you think it is a severe allergic reaction or other emergency that cant wait, call 9-1-1 and get the person to the nearest hospital. Otherwise, call your doctor.  Reactions should be reported to the Vaccine Adverse Event Reporting System (VAERS). Your doctor should file this report, or you can do it yourself through  the VAERS web site at www.vaers. [facility].gov, or by calling 5-155.837.2540. VAERS does not give medical advice. 6. The National Vaccine Injury Compensation Program    The [facility] Vaccine Injury Compensation Program (VICP) is a federal program that was created to compensate people who may have been injured by certain vaccines. Persons who believe they may have been injured by a vaccine can learn about the program and about filing a claim by calling 1-946.385.6744 or visiting the [employer] website at www.[facility].gov/vaccinecompensation. There is a time limit to file a claim for compensation. 7. How can I learn more?  Ask your healthcare provider. He or she can give you the vaccine package insert or suggest other sources of information.  Call your local or state health department.  Contact the Centers for Disease Control and Prevention (CDC):  - Call 6-857.556.5310 (1-800-CDC-INFO) or  - Visit CDCs website at www.cdc.gov/flu    Vaccine Information Statement   Inactivated Influenza Vaccine   8/7/2015  42 LEIClay Hernandez Pop [id number]    Department of Health and Human Services  Centers for Disease Control and Prevention    Office Use Only

## 2017-08-30 LAB
BASOPHILS # BLD AUTO: 0 X10E3/UL (ref 0–0.2)
BASOPHILS NFR BLD AUTO: 1 %
EOSINOPHIL # BLD AUTO: 0.3 X10E3/UL (ref 0–0.4)
EOSINOPHIL NFR BLD AUTO: 5 %
ERYTHROCYTE [DISTWIDTH] IN BLOOD BY AUTOMATED COUNT: 14.1 % (ref 12.3–15.4)
HCT VFR BLD AUTO: 38.2 % (ref 34–46.6)
HGB BLD-MCNC: 12.8 G/DL (ref 11.1–15.9)
IMM GRANULOCYTES # BLD: 0 X10E3/UL (ref 0–0.1)
IMM GRANULOCYTES NFR BLD: 0 %
LYMPHOCYTES # BLD AUTO: 1.8 X10E3/UL (ref 0.7–3.1)
LYMPHOCYTES NFR BLD AUTO: 30 %
MCH RBC QN AUTO: 29.4 PG (ref 26.6–33)
MCHC RBC AUTO-ENTMCNC: 33.5 G/DL (ref 31.5–35.7)
MCV RBC AUTO: 88 FL (ref 79–97)
MONOCYTES # BLD AUTO: 0.3 X10E3/UL (ref 0.1–0.9)
MONOCYTES NFR BLD AUTO: 6 %
NEUTROPHILS # BLD AUTO: 3.5 X10E3/UL (ref 1.4–7)
NEUTROPHILS NFR BLD AUTO: 58 %
PLATELET # BLD AUTO: 308 X10E3/UL (ref 150–379)
RBC # BLD AUTO: 4.35 X10E6/UL (ref 3.77–5.28)
T4 FREE SERPL-MCNC: 1.12 NG/DL (ref 0.82–1.77)
TSH SERPL DL<=0.005 MIU/L-ACNC: 2.26 UIU/ML (ref 0.45–4.5)
WBC # BLD AUTO: 6 X10E3/UL (ref 3.4–10.8)

## 2017-09-25 RX ORDER — METFORMIN HYDROCHLORIDE 500 MG/1
TABLET ORAL
Qty: 120 TAB | Refills: 2 | Status: SHIPPED | OUTPATIENT
Start: 2017-09-25 | End: 2017-12-26 | Stop reason: SDUPTHER

## 2017-11-10 RX ORDER — PEN NEEDLE, DIABETIC 29 G X1/2"
NEEDLE, DISPOSABLE MISCELLANEOUS
Qty: 60 SYRINGE | Refills: 3 | Status: SHIPPED | OUTPATIENT
Start: 2017-11-10 | End: 2018-03-22 | Stop reason: SDUPTHER

## 2017-12-01 RX ORDER — LANCETS
EACH MISCELLANEOUS
Qty: 120 EACH | Refills: 4 | Status: CANCELLED | OUTPATIENT
Start: 2017-12-01

## 2017-12-01 NOTE — TELEPHONE ENCOUNTER
Pt called stated that she requested her Lancets 6 days ago with [facility] and requested her Test Strips yesterday. She stated that a DWO form should have been faxed over. She is completely out of medication. Would like a call when it is faxed over.

## 2017-12-01 NOTE — TELEPHONE ENCOUNTER
DWO form for lancets completed by Dr Alexia Anderson today. Did not receive separate form for strips but added that onto lancets form. To be faxed to Pharmacy today. Pt advised.

## 2017-12-05 RX ORDER — LANCETS
EACH MISCELLANEOUS
Qty: 120 EACH | Refills: 4 | Status: SHIPPED | OUTPATIENT
Start: 2017-12-05 | End: 2018-04-09 | Stop reason: SDUPTHER

## 2017-12-26 RX ORDER — METFORMIN HYDROCHLORIDE 500 MG/1
TABLET ORAL
Qty: 120 TAB | Refills: 2 | Status: SHIPPED | OUTPATIENT
Start: 2017-12-26 | End: 2018-03-22 | Stop reason: SDUPTHER

## 2018-02-08 NOTE — TELEPHONE ENCOUNTER
Pt states pharmacy was supposed to send over request Monday and will need a DWO form filled out in order for it to be processed

## 2018-02-12 ENCOUNTER — HOSPITAL ENCOUNTER (OUTPATIENT)
Dept: LAB | Age: 73
Discharge: HOME | End: 2018-02-12
Payer: MEDICARE

## 2018-02-12 ENCOUNTER — APPOINTMENT (OUTPATIENT)
Dept: INTERNAL MEDICINE CLINIC | Age: 73
End: 2018-02-12

## 2018-02-12 DIAGNOSIS — E11.9 TYPE 2 DIABETES MELLITUS WITHOUT COMPLICATION, WITH LONG-TERM CURRENT USE OF INSULIN (HCC): ICD-10-CM

## 2018-02-12 DIAGNOSIS — Z79.4 TYPE 2 DIABETES MELLITUS WITHOUT COMPLICATION, WITH LONG-TERM CURRENT USE OF INSULIN (HCC): ICD-10-CM

## 2018-02-12 DIAGNOSIS — E78.5 HYPERLIPIDEMIA, UNSPECIFIED HYPERLIPIDEMIA TYPE: ICD-10-CM

## 2018-02-12 PROCEDURE — 80053 COMPREHEN METABOLIC PANEL: CPT

## 2018-02-12 PROCEDURE — 80061 LIPID PANEL: CPT

## 2018-02-12 PROCEDURE — 83036 HEMOGLOBIN GLYCOSYLATED A1C: CPT

## 2018-02-12 PROCEDURE — 36415 COLL VENOUS BLD VENIPUNCTURE: CPT

## 2018-02-13 LAB
ALBUMIN SERPL-MCNC: 4.3 G/DL (ref 3.5–4.8)
ALBUMIN/GLOB SERPL: 2 {RATIO} (ref 1.2–2.2)
ALP SERPL-CCNC: 50 IU/L (ref 39–117)
ALT SERPL-CCNC: 14 IU/L (ref 0–32)
AST SERPL-CCNC: 19 IU/L (ref 0–40)
BILIRUB SERPL-MCNC: 0.4 MG/DL (ref 0–1.2)
BUN SERPL-MCNC: 24 MG/DL (ref 8–27)
BUN/CREAT SERPL: 32 (ref 12–28)
CALCIUM SERPL-MCNC: 9.6 MG/DL (ref 8.7–10.3)
CHLORIDE SERPL-SCNC: 101 MMOL/L (ref 96–106)
CHOLEST SERPL-MCNC: 174 MG/DL (ref 100–199)
CO2 SERPL-SCNC: 25 MMOL/L (ref 18–29)
CREAT SERPL-MCNC: 0.75 MG/DL (ref 0.57–1)
EST. AVERAGE GLUCOSE BLD GHB EST-MCNC: 148 MG/DL
GFR SERPLBLD CREATININE-BSD FMLA CKD-EPI: 79 ML/MIN/1.73
GFR SERPLBLD CREATININE-BSD FMLA CKD-EPI: 91 ML/MIN/1.73
GLOBULIN SER CALC-MCNC: 2.1 G/DL (ref 1.5–4.5)
GLUCOSE SERPL-MCNC: 108 MG/DL (ref 65–99)
HBA1C MFR BLD: 6.8 % (ref 4.8–5.6)
HDLC SERPL-MCNC: 71 MG/DL
INTERPRETATION, 910389: NORMAL
LDLC SERPL CALC-MCNC: 89 MG/DL (ref 0–99)
Lab: NORMAL
POTASSIUM SERPL-SCNC: 4.7 MMOL/L (ref 3.5–5.2)
PROT SERPL-MCNC: 6.4 G/DL (ref 6–8.5)
SODIUM SERPL-SCNC: 141 MMOL/L (ref 134–144)
TRIGL SERPL-MCNC: 69 MG/DL (ref 0–149)
VLDLC SERPL CALC-MCNC: 14 MG/DL (ref 5–40)

## 2018-02-19 ENCOUNTER — OFFICE VISIT (OUTPATIENT)
Dept: INTERNAL MEDICINE CLINIC | Age: 73
End: 2018-02-19

## 2018-02-19 VITALS
SYSTOLIC BLOOD PRESSURE: 117 MMHG | RESPIRATION RATE: 14 BRPM | OXYGEN SATURATION: 98 % | HEART RATE: 84 BPM | HEIGHT: 66 IN | DIASTOLIC BLOOD PRESSURE: 73 MMHG | BODY MASS INDEX: 23.4 KG/M2 | WEIGHT: 145.6 LBS | TEMPERATURE: 98.1 F

## 2018-02-19 DIAGNOSIS — M25.511 CHRONIC RIGHT SHOULDER PAIN: ICD-10-CM

## 2018-02-19 DIAGNOSIS — K63.5 POLYP OF COLON, UNSPECIFIED PART OF COLON, UNSPECIFIED TYPE: ICD-10-CM

## 2018-02-19 DIAGNOSIS — G89.29 CHRONIC RIGHT SHOULDER PAIN: ICD-10-CM

## 2018-02-19 DIAGNOSIS — E78.5 HYPERLIPIDEMIA, UNSPECIFIED HYPERLIPIDEMIA TYPE: ICD-10-CM

## 2018-02-19 DIAGNOSIS — Z79.4 TYPE 2 DIABETES MELLITUS WITHOUT COMPLICATION, WITH LONG-TERM CURRENT USE OF INSULIN (HCC): Primary | ICD-10-CM

## 2018-02-19 DIAGNOSIS — E11.9 TYPE 2 DIABETES MELLITUS WITHOUT COMPLICATION, WITH LONG-TERM CURRENT USE OF INSULIN (HCC): Primary | ICD-10-CM

## 2018-02-19 NOTE — PROGRESS NOTES
HPI  Ms. Verner Proctor is a 74y.o. year old female, she is seen today for follow up HTN, high cholesterol, DM. Has been to PT for right shoulder pain, seen by Dr. Andry Nicole and then sent back to PT by him. Had MRI and cortisone injection and glucose was higher during that time. Shoulder is much better now. When she checks glucose has been well controlled for the past month - didn't bring log. Says cortisone injection was in November - glucose was into 300s for a few days. This morning glucose was 140. May have few glucose levels around 78 in the morning which is lowest it has been in last year - was asymptomatic. Exercising about 2 days per week in the pool, trying to add a 3rd day. Chief Complaint   Patient presents with    Blood Pressure Check     Room 2//     Cholesterol Problem        Prior to Admission medications    Medication Sig Start Date End Date Taking? Authorizing Provider   metFORMIN (GLUCOPHAGE) 500 mg tablet TAKE 2 TABLETS BY MOUTH  EVERY AM AND 1 TABLET AT NOON AND 1 TABLET WITH DINNER 12/26/17  Yes Woodroe Habermann, MD   Lancets [facility] ULTRASOFT LANCETS) misc USE TO TEST BLOOD SUGAR THREE TIMES A DAY DX E11.9 12/5/17  Yes Woodroe Habermann, MD   glucose blood VI test strips (ONETOUCH ULTRA TEST) strip USE TO TEST BLOOD SUGAR THREE TIMES A DAY DX E11.9 12/5/17  Yes Woodroe Habermann, MD   BD INSULIN SYRINGE ULTRA-FINE 0.3 mL 31 gauge x 5/16 syrg USE AS DIRECTED TWICE A DAY 11/10/17  Yes Woodroe Habermann, MD   spironolactone (ALDACTONE) 50 mg tablet take 1 tablet by mouth once daily 8/4/17  Yes Woodroe Habermann, MD   LANTUS 100 unit/mL injection INJECT 11 UNITS EVERY MORNING AND 3 UNITS EVERY EVENING FOR DIABETES CONTROL 5/31/17  Yes Woodroe Habermann, MD   losartan (COZAAR) 50 mg tablet TAKE ONE TABLET BY MOUTH DAILY 4/5/17  Yes Woodroe Habermann, MD   Calcium Carbonate-Vit D3-Min (CALTRATE 600+D PLUS MINERALS) 600 mg (1,500 mg)-400 unit Chew Take  by mouth daily.  6/27/12 Yes Deric Cortes., MD   multivitamins-minerals-lutein (CENTRUM SILVER) Tab Take  by mouth. Yes Historical Provider   cholecalciferol, vitamin d3, (VITAMIN D) 1,000 unit tablet Take  by mouth daily. Yes Historical Provider         Allergies   Allergen Reactions    Lisinopril Cough    Simvastatin Myalgia         REVIEW OF SYSTEMS:  Per HPI    PHYSICAL EXAM:  Visit Vitals    /73 (BP 1 Location: Right arm, BP Patient Position: Sitting)    Pulse 84    Temp 98.1 °F (36.7 °C) (Oral)    Resp 14    Ht 5' 6\" (1.676 m)    Wt 145 lb 9.6 oz (66 kg)    SpO2 98%    BMI 23.5 kg/m2     Constitutional: Appears well-developed and well-nourished. No distress. HENT:   Head: Normocephalic and atraumatic. Eyes: No scleral icterus. Neck: no lad, no tm, supple   Cardiovascular: Normal S1/S2, regular rhythm. No murmurs, rubs, or gallops. Pulmonary/Chest: Effort normal and breath sounds normal. No respiratory distress. No wheezes, rhonchi, or rales. Ext: No edema. Neurological: Alert. Psychiatric: Normal mood and affect. Behavior is normal.     DM foot exam: 2+ pedal pulses, no lesions, sensation intact to monofilament b/l     Lab Results   Component Value Date/Time    Sodium 141 02/12/2018 08:33 AM    Potassium 4.7 02/12/2018 08:33 AM    Chloride 101 02/12/2018 08:33 AM    CO2 25 02/12/2018 08:33 AM    Anion gap 9 11/01/2010 08:07 AM    Glucose 108 (H) 02/12/2018 08:33 AM    BUN 24 02/12/2018 08:33 AM    Creatinine 0.75 02/12/2018 08:33 AM    BUN/Creatinine ratio 32 (H) 02/12/2018 08:33 AM    GFR est AA 91 02/12/2018 08:33 AM    GFR est non-AA 79 02/12/2018 08:33 AM    Calcium 9.6 02/12/2018 08:33 AM    Bilirubin, total 0.4 02/12/2018 08:33 AM    AST (SGOT) 19 02/12/2018 08:33 AM    Alk.  phosphatase 50 02/12/2018 08:33 AM    Protein, total 6.4 02/12/2018 08:33 AM    Albumin 4.3 02/12/2018 08:33 AM    Globulin 2.4 11/01/2010 08:07 AM    A-G Ratio 2.0 02/12/2018 08:33 AM    ALT (SGPT) 14 02/12/2018 08:33 AM     Lab Results   Component Value Date/Time    Hemoglobin A1c 6.8 (H) 02/12/2018 08:33 AM    Hemoglobin A1c 6.9 (H) 08/22/2017 08:11 AM    Hemoglobin A1c 7.3 (H) 02/20/2017 09:28 AM      Lab Results   Component Value Date/Time    Cholesterol, total 174 02/12/2018 08:33 AM    HDL Cholesterol 71 02/12/2018 08:33 AM    LDL, calculated 89 02/12/2018 08:33 AM    VLDL, calculated 14 02/12/2018 08:33 AM    Triglyceride 69 02/12/2018 08:33 AM    CHOL/HDL Ratio 1.5 11/01/2010 08:07 AM          ASSESSMENT/PLAN  Diagnoses and all orders for this visit:    1. Type 2 diabetes mellitus without complication, with long-term current use of insulin (HCC)  -     HEMOGLOBIN A1C WITH EAG; Future  -      DIABETES FOOT EXAM  Excellent control - continue current medications   2. Hyperlipidemia, unspecified hyperlipidemia type  -     METABOLIC PANEL, COMPREHENSIVE; Future  -     LIPID PANEL; Future  At goal, continue current medications   3. Chronic right shoulder pain  Improved s/p injection and PT, followed by ortho  4. Polyp of colon, unspecified part of colon, unspecified type    Up to date on colonoscopy      Health Maintenance Due   Topic Date Due    EYE EXAM RETINAL OR DILATED Q1  01/16/2018        Follow-up Disposition:  Return in about 6 months (around 8/19/2018) for bp, chol, dm, labs prior. Reviewed plan of care. Patient has provided input and agrees with goals. The nurse provided the patient and/or family with advanced directive information if needed and encouraged the patient to provide a copy to the office when available.

## 2018-02-19 NOTE — PROGRESS NOTES
Carolyn Cowan  Identified pt with two pt identifiers(name and ). Chief Complaint   Patient presents with    Blood Pressure Check     Room 2//     Cholesterol Problem       1. Have you been to the ER, urgent care clinic since your last visit? Hospitalized since your last visit? NO    2. Have you seen or consulted any other health care providers outside of the [address] [place] [address] since your last visit? Dr Odessa Bernal Post notified of reason for visit, vitals and flowsheets obtained on patients.      Patient received paperwork for advance directive during previous visit but has not completed at this time     Reviewed record In preparation for visit, huddled with provider and have obtained necessary documentation      Health Maintenance Due   Topic    EYE EXAM RETINAL OR DILATED Q1     FOOT EXAM Q1        Wt Readings from Last 3 Encounters:   18 145 lb 9.6 oz (66 kg)   17 150 lb 9.6 oz (68.3 kg)   17 152 lb (68.9 kg)     Temp Readings from Last 3 Encounters:   17 98 °F (36.7 °C) (Oral)   17 97.6 °F (36.4 °C) (Oral)   17 98.2 °F (36.8 °C) (Oral)     BP Readings from Last 3 Encounters:   17 116/68   17 136/78   17 119/78     Pulse Readings from Last 3 Encounters:   17 84   17 73   17 82     Vitals:    18 0850   Resp: 14   Weight: 145 lb 9.6 oz (66 kg)   Height: 5' 6\" (1.676 m)   PainSc:   0 - No pain         Learning Assessment:  :     Learning Assessment 2016   PRIMARY LEARNER Patient Patient   HIGHEST LEVEL OF EDUCATION - PRIMARY LEARNER  > 4 YEARS OF COLLEGE -   BARRIERS PRIMARY LEARNER NONE -   Levester Rule CAREGIVER No -   PRIMARY LANGUAGE ENGLISH ENGLISH   LEARNER PREFERENCE PRIMARY READING DEMONSTRATION   ANSWERED BY Patient patient   RELATIONSHIP SELF SELF       Depression Screening:  :     PHQ over the last two weeks 2016   Little interest or pleasure in doing things Not at all   Feeling down, depressed or hopeless Not at all   Total Score PHQ 2 0       Fall Risk Assessment:  :     Fall Risk Assessment, last 12 mths 8/31/2016   Able to walk? Yes   Fall in past 12 months? No       Abuse Screening:  :     Abuse Screening Questionnaire 8/24/2016 2/10/2015   Do you ever feel afraid of your partner? N N   Are you in a relationship with someone who physically or mentally threatens you? N N   Is it safe for you to go home? Y Y       ADL Screening:  :     ADL Assessment 2/10/2015   Feeding yourself No Help Needed   Getting from bed to chair No Help Needed   Getting dressed No Help Needed   Bathing or showering No Help Needed   Walk across the room (includes cane/walker) No Help Needed   Using the telphone No Help Needed   Taking your medications No Help Needed   Preparing meals No Help Needed   Managing money (expenses/bills) No Help Needed   Moderately strenuous housework (laundry) No Help Needed   Shopping for personal items (toiletries/medicines) No Help Needed   Shopping for groceries No Help Needed   Driving No Help Needed   Climbing a flight of stairs No Help Needed   Getting to places beyond walking distances No Help Needed                I have received verbal consent from [address] [place] [address] to discuss any/all medical information while they are present in the room. Medication reconciliation up to date and corrected with patient at this time.

## 2018-02-19 NOTE — MR AVS SNAPSHOT
[address] [place] [address] [phone] Patient: Sadi Ochoa MRN: [id number] TRV:3/24/1974 Visit Information Date & Time Provider Department Dept. Phone Encounter #  
 2/19/2018  9:00 AM Brady Berman Roxana [phone] [id number] Follow-up Instructions Return in about 6 months (around 8/19/2018) for bp, chol, dm, labs prior. Upcoming Health Maintenance Date Due  
 EYE EXAM RETINAL OR DILATED Q1 1/16/2018 FOOT EXAM Q1 2/27/2018 MEDICARE YEARLY EXAM 2/28/2018 HEMOGLOBIN A1C Q6M 8/12/2018 MICROALBUMIN Q1 8/29/2018 GLAUCOMA SCREENING Q2Y 1/16/2019 LIPID PANEL Q1 2/12/2019 BREAST CANCER SCRN MAMMOGRAM 3/31/2019 COLONOSCOPY 11/16/2021 DTaP/Tdap/Td series (2 - Td) 7/9/2023 Allergies as of 2/19/2018  Review Complete On: 2/19/2018 By: Sherrie Jean MD  
  
 Severity Noted Reaction Type Reactions Lisinopril  01/09/2013    Cough Simvastatin  08/24/2016    Myalgia Current Immunizations  Reviewed on 2/27/2017 Name Date Hepatitis A Vaccine 5/7/2012, 1/1/2003 Hepatitis B Vaccine 1/1/2003 IPV 5/7/2012, 12/1/2004 Influenza High Dose Vaccine PF 8/29/2017, 9/4/2015 Influenza Vaccine 10/30/2013 Influenza Vaccine Split 11/4/2010 Influenza Vaccine Whole 11/21/2012 Pneumococcal Conjugate (PCV-13) 9/4/2015 TD Vaccine 10/1/2004 Tdap 7/9/2013 ZZZ-RETIRED (DO NOT USE) Pneumococcal Vaccine (Unspecified Type) 1/1/2009 Zoster 1/1/2009 Not reviewed this visit You Were Diagnosed With   
  
 Codes Comments Type 2 diabetes mellitus without complication, with long-term current use of insulin (HCC)    -  Primary ICD-10-CM: E11.9, Z79.4 ICD-9-CM: 250.00, V58.67 Hyperlipidemia, unspecified hyperlipidemia type     ICD-10-CM: E78.5 ICD-9-CM: 272.4 Chronic right shoulder pain     ICD-10-CM: M25.511, G89.29 ICD-9-CM: 719.41, 338.29 Polyp of colon, unspecified part of colon, unspecified type     ICD-10-CM: K63.5 ICD-9-CM: 211.3 Vitals BP Pulse Temp Resp Height(growth percentile) Weight(growth percentile) 117/73 (BP 1 Location: Right arm, BP Patient Position: Sitting) 84 98.1 °F (36.7 °C) (Oral) 14 5' 6\" (1.676 m) 145 lb 9.6 oz (66 kg) SpO2 BMI OB Status Smoking Status 98% 23.5 kg/m2 Hysterectomy Never Smoker Vitals History BMI and BSA Data Body Mass Index Body Surface Area  
 23.5 kg/m 2 1.75 m 2 Preferred Pharmacy Pharmacy Name Phone [id number] [phone] Your Updated Medication List  
  
   
This list is accurate as of: 2/19/18  9:13 AM.  Always use your most recent med list.  
  
  
  
  
 BD INSULIN SYRINGE ULTRA-FINE 0.3 mL 31 gauge x 5/16 Syrg Generic drug:  Insulin Syringe-Needle U-100  
USE AS DIRECTED TWICE A DAY  
  
 CALTRATE 600+D PLUS MINERALS 600 mg (1,500 mg)-400 unit Chew Generic drug:  Calcium Carbonate-Vit D3-Min Take  by mouth daily. CENTRUM SILVER Tab tablet Generic drug:  multivitamins-minerals-lutein Take  by mouth. glucose blood VI test strips strip Commonly known as:  ONETOUCH ULTRA TEST  
USE TO TEST BLOOD SUGAR THREE TIMES A DAY DX E11.9 Lancets [facility] Commonly known as:  ONETOUCH ULTRASOFT LANCETS  
USE TO TEST BLOOD SUGAR THREE TIMES A DAY DX E11.9 LANTUS U-100 INSULIN 100 unit/mL injection Generic drug:  insulin glargine INJECT 11 UNITS EVERY MORNING AND 3 UNITS EVERY EVENING FOR DIABETES CONTROL  
  
 losartan 50 mg tablet Commonly known as:  COZAAR  
TAKE ONE TABLET BY MOUTH DAILY  
  
 metFORMIN 500 mg tablet Commonly known as:  GLUCOPHAGE  
TAKE 2 TABLETS BY MOUTH  EVERY AM AND 1 TABLET AT NOON AND 1 TABLET WITH DINNER  
  
 spironolactone 50 mg tablet Commonly known as:  ALDACTONE  
take 1 tablet by mouth once daily VITAMIN D3 1,000 unit tablet Generic drug:  cholecalciferol Take  by mouth daily. Follow-up Instructions Return in about 6 months (around 8/19/2018) for bp, chol, dm, labs prior. To-Do List   
 08/18/2018 Lab:  HEMOGLOBIN A1C WITH EAG   
  
 08/18/2018 Lab:  LIPID PANEL   
  
 08/18/2018 Lab:  METABOLIC PANEL, COMPREHENSIVE [facility]! Dear Bhaskar Loving: Thank you for requesting a Tabber account. Our records indicate that you already have an active Tabber account. You can access your account anytime at [email or web address] Did you know that you can access your hospital and ER discharge instructions at any time in Tabber? You can also review all of your test results from your hospital stay or ER visit. Additional Information If you have questions, please visit the Frequently Asked Questions section of the Tabber website at [email or web address] Remember, Tabber is NOT to be used for urgent needs. For medical emergencies, dial 911. Now available from your iPhone and Android! Please provide this summary of care documentation to your next provider. Your primary care clinician is listed as Drea Grace. If you have any questions after today's visit, please call [phone].

## 2018-03-22 RX ORDER — PEN NEEDLE, DIABETIC 29 G X1/2"
NEEDLE, DISPOSABLE MISCELLANEOUS
Qty: 60 SYRINGE | Refills: 3 | Status: SHIPPED | OUTPATIENT
Start: 2018-03-22 | End: 2018-07-23 | Stop reason: SDUPTHER

## 2018-03-22 RX ORDER — METFORMIN HYDROCHLORIDE 500 MG/1
TABLET ORAL
Qty: 120 TAB | Refills: 2 | Status: SHIPPED | OUTPATIENT
Start: 2018-03-22 | End: 2018-06-25 | Stop reason: SDUPTHER

## 2018-04-09 ENCOUNTER — TELEPHONE (OUTPATIENT)
Dept: INTERNAL MEDICINE CLINIC | Facility: CLINIC | Age: 73
End: 2018-04-09

## 2018-04-09 RX ORDER — LANCETS
EACH MISCELLANEOUS
Qty: 360 EACH | Refills: 3 | Status: SHIPPED | OUTPATIENT
Start: 2018-04-09 | End: 2019-09-23 | Stop reason: SDUPTHER

## 2018-04-09 NOTE — TELEPHONE ENCOUNTER
----- Message from Dane Koch sent at 4/9/2018  1:23 PM EDT -----  Regarding: FW: Dr. Adry Jenkins    Per [facility]:    ----- Message -----     From: Fran Cunha     Sent: 4/9/2018   1:14 PM       To: [facility] Office  Subject: Dr. Adry Jenkins                             The pt is asking the doctor or nurse give her a call in reference to medication and faxed forms per [facility]. Best Contact [phone].

## 2018-04-12 ENCOUNTER — TELEPHONE (OUTPATIENT)
Dept: INTERNAL MEDICINE CLINIC | Facility: CLINIC | Age: 73
End: 2018-04-12

## 2018-04-12 NOTE — TELEPHONE ENCOUNTER
Form received while Dr Ronne Dakin was on vacation last week. In basket to be signed. Completed and faxed back to pharmacy. Pt notified.

## 2018-04-12 NOTE — TELEPHONE ENCOUNTER
----- Message from Bakarijayjayboom Leavitt sent at 4/12/2018 12:48 PM EDT -----  Regarding: DWO form  Contact: [phone]  Pt stated she has been waiting 2 weeks to get her DWO form signed for her Lancets. Pt is completely out and had to go purchase some. Pt is upset and wants form filled out now.

## 2018-05-28 RX ORDER — INSULIN GLARGINE 100 [IU]/ML
INJECTION, SOLUTION SUBCUTANEOUS
Qty: 10 ML | Refills: 10 | Status: SHIPPED | OUTPATIENT
Start: 2018-05-28 | End: 2019-04-16 | Stop reason: SDUPTHER

## 2018-06-25 RX ORDER — METFORMIN HYDROCHLORIDE 500 MG/1
TABLET ORAL
Qty: 120 TAB | Refills: 2 | Status: SHIPPED | OUTPATIENT
Start: 2018-06-25 | End: 2018-08-20 | Stop reason: SDUPTHER

## 2018-07-03 ENCOUNTER — HOSPITAL ENCOUNTER (OUTPATIENT)
Dept: MAMMOGRAPHY | Age: 73
Discharge: HOME | End: 2018-07-03
Attending: INTERNAL MEDICINE
Payer: MEDICARE

## 2018-07-03 DIAGNOSIS — Z12.39 SCREENING BREAST EXAMINATION: ICD-10-CM

## 2018-07-03 PROCEDURE — 77063 BREAST TOMOSYNTHESIS BI: CPT

## 2018-07-23 RX ORDER — PEN NEEDLE, DIABETIC 29 G X1/2"
NEEDLE, DISPOSABLE MISCELLANEOUS
Qty: 60 SYRINGE | Refills: 3 | Status: SHIPPED | OUTPATIENT
Start: 2018-07-23 | End: 2018-08-20 | Stop reason: SDUPTHER

## 2018-07-26 RX ORDER — LOSARTAN POTASSIUM 50 MG/1
TABLET ORAL
Qty: 90 TAB | Refills: 3 | Status: SHIPPED | OUTPATIENT
Start: 2018-07-26 | End: 2019-08-07 | Stop reason: SDUPTHER

## 2018-08-20 ENCOUNTER — OFFICE VISIT (OUTPATIENT)
Dept: INTERNAL MEDICINE CLINIC | Facility: CLINIC | Age: 73
End: 2018-08-20

## 2018-08-20 VITALS
WEIGHT: 149.6 LBS | HEART RATE: 72 BPM | TEMPERATURE: 97.9 F | DIASTOLIC BLOOD PRESSURE: 69 MMHG | RESPIRATION RATE: 16 BRPM | SYSTOLIC BLOOD PRESSURE: 114 MMHG | HEIGHT: 66 IN | OXYGEN SATURATION: 97 % | BODY MASS INDEX: 24.04 KG/M2

## 2018-08-20 DIAGNOSIS — E78.5 HYPERLIPIDEMIA, UNSPECIFIED HYPERLIPIDEMIA TYPE: ICD-10-CM

## 2018-08-20 DIAGNOSIS — Z13.31 SCREENING FOR DEPRESSION: ICD-10-CM

## 2018-08-20 DIAGNOSIS — Z71.89 ADVANCED DIRECTIVES, COUNSELING/DISCUSSION: ICD-10-CM

## 2018-08-20 DIAGNOSIS — E11.9 TYPE 2 DIABETES MELLITUS WITHOUT COMPLICATION, WITH LONG-TERM CURRENT USE OF INSULIN (HCC): ICD-10-CM

## 2018-08-20 DIAGNOSIS — Z79.4 TYPE 2 DIABETES MELLITUS WITHOUT COMPLICATION, WITH LONG-TERM CURRENT USE OF INSULIN (HCC): ICD-10-CM

## 2018-08-20 DIAGNOSIS — Z13.39 SCREENING FOR ALCOHOLISM: ICD-10-CM

## 2018-08-20 DIAGNOSIS — H91.90 HEARING LOSS, UNSPECIFIED HEARING LOSS TYPE, UNSPECIFIED LATERALITY: ICD-10-CM

## 2018-08-20 DIAGNOSIS — Z00.00 MEDICARE ANNUAL WELLNESS VISIT, SUBSEQUENT: Primary | ICD-10-CM

## 2018-08-20 RX ORDER — CALCIUM CARB/VITAMIN D3/VIT K1 500-100-40
TABLET,CHEWABLE ORAL
Qty: 60 SYRINGE | Refills: 11 | Status: SHIPPED | OUTPATIENT
Start: 2018-08-20 | End: 2019-08-12 | Stop reason: SDUPTHER

## 2018-08-20 RX ORDER — METFORMIN HYDROCHLORIDE 500 MG/1
TABLET ORAL
Qty: 120 TAB | Refills: 2 | Status: CANCELLED | OUTPATIENT
Start: 2018-08-20

## 2018-08-20 RX ORDER — METFORMIN HYDROCHLORIDE 500 MG/1
TABLET ORAL
Qty: 360 TAB | Refills: 4 | Status: SHIPPED | OUTPATIENT
Start: 2018-08-20 | End: 2019-09-09 | Stop reason: SDUPTHER

## 2018-08-20 NOTE — MR AVS SNAPSHOT
[address] [address] [phone] Patient: Kymberly Gonzalez MRN: [id number] Visit Information Date & Time Provider Department Dept. Phone Encounter #  
 8/20/2018  9:30 AM Jena Sol MD [facility] [address] [id number] Follow-up Instructions Return in about 6 months (around 2/20/2019) for bp, chol, dm, labs prior. Upcoming Health Maintenance Date Due  
 MEDICARE YEARLY EXAM 3/14/2018 MICROALBUMIN Q1 8/29/2018 Influenza Age 5 to Adult 9/29/2018* EYE EXAM RETINAL OR DILATED Q1 1/22/2019 HEMOGLOBIN A1C Q6M 2/15/2019 FOOT EXAM Q1 2/19/2019 LIPID PANEL Q1 8/15/2019 GLAUCOMA SCREENING Q2Y 1/22/2020 BREAST CANCER SCRN MAMMOGRAM 7/3/2020 COLONOSCOPY 11/16/2021 DTaP/Tdap/Td series (2 - Td) 7/9/2023 *Topic was postponed. The date shown is not the original due date. Allergies as of 8/20/2018  Review Complete On: 8/20/2018 By: Jena Sol MD  
  
 Severity Noted Reaction Type Reactions Lisinopril  01/09/2013    Cough Simvastatin  08/24/2016    Myalgia Current Immunizations  Reviewed on 2/27/2017 Name Date Hepatitis A Vaccine 5/7/2012, 1/1/2003 Hepatitis B Vaccine 1/1/2003 IPV 5/7/2012, 12/1/2004 Influenza High Dose Vaccine PF 8/29/2017, 9/4/2015 Influenza Vaccine 10/30/2013 Influenza Vaccine Split 11/4/2010 Influenza Vaccine Whole 11/21/2012 Pneumococcal Conjugate (PCV-13) 9/4/2015 TD Vaccine 10/1/2004 Tdap 7/9/2013 ZZZ-RETIRED (DO NOT USE) Pneumococcal Vaccine (Unspecified Type) 1/1/2009 Zoster 1/1/2009 Not reviewed this visit You Were Diagnosed With   
  
 Codes Comments Medicare annual wellness visit, subsequent    -  Primary ICD-10-CM: Z00.00 ICD-9-CM: V70.0  Type 2 diabetes mellitus without complication, with long-term current use of insulin ([facility]     ICD-10-CM: E11.9, Z79.4 ICD-9-CM: 250.00, V58.67 Hyperlipidemia, unspecified hyperlipidemia type     ICD-10-CM: E78.5 ICD-9-CM: 272.4 Advanced directives, counseling/discussion     ICD-10-CM: Z71.89 ICD-9-CM: V65.49 Screening for alcoholism     ICD-10-CM: Z13.89 ICD-9-CM: V79.1 Screening for depression     ICD-10-CM: Z13.89 ICD-9-CM: V79.0 Hearing loss, unspecified hearing loss type, unspecified laterality     ICD-10-CM: H91.90 ICD-9-CM: 708. 9 Vitals BP Pulse Temp Resp Height(growth percentile) Weight(growth percentile) 114/69 (BP 1 Location: Left arm, BP Patient Position: Sitting) 72 97.9 °F (36.6 °C) (Oral) 16 5' 6\" (1.676 m) 149 lb 9.6 oz (67.9 kg) LMP SpO2 BMI OB Status Smoking Status (LMP Unknown) 97% 24.15 kg/m2 Hysterectomy Never Smoker Vitals History BMI and BSA Data Body Mass Index Body Surface Area  
 24.15 kg/m 2 1.78 m 2 Preferred Pharmacy Pharmacy Name Phone [id number] [phone] Your Updated Medication List  
  
   
This list is accurate as of 8/20/18 10:11 AM.  Always use your most recent med list.  
  
  
  
  
 CALTRATE 600+D PLUS MINERALS 600 mg (1,500 mg)-400 unit Chew Generic drug:  Calcium Carbonate-Vit D3-Min Take  by mouth daily. CENTRUM SILVER Tab tablet Generic drug:  multivitamins-minerals-lutein Take  by mouth. glucose blood VI test strips strip Commonly known as:  ONETOUCH ULTRA TEST  
USE TO TEST BLOOD SUGAR THREE TIMES A DAY DX E11.9 Insulin Syringe-Needle U-100 0.3 mL 31 gauge x 5/16 Syrg Commonly known as:  BD INSULIN SYRINGE ULTRA-FINE Use as directed twice a day Lancets Misc Commonly known as:  ONETOUCH ULTRASOFT LANCETS  
USE TO TEST BLOOD SUGAR THREE TIMES A DAY DX E11.9 LANTUS U-100 INSULIN 100 unit/mL injection Generic drug:  insulin glargine INJECT 11 UNITS EVERY MORNING AND 3 UNITS EVERY EVENING FOR DIABETES CONTROL  
  
 losartan 50 mg tablet Commonly known as:  COZAAR  
take 1 tablet by mouth once daily  
  
 metFORMIN 500 mg tablet Commonly known as:  GLUCOPHAGE  
take 2 tablets by mouth every morning AND 1TABLET AT NOON AND DINNERTIME  
  
 spironolactone 50 mg tablet Commonly known as:  ALDACTONE  
take 1 tablet by mouth once daily VITAMIN D3 1,000 unit tablet Generic drug:  cholecalciferol Take  by mouth daily. Prescriptions Sent to Pharmacy Refills Insulin Syringe-Needle U-100 (BD INSULIN SYRINGE ULTRA-FINE) 0.3 mL 31 gauge x 5/16 syrg 11 Sig: Use as directed twice a day Class: Normal  
 [facility] Ph #: [phone]  
 metFORMIN (GLUCOPHAGE) 500 mg tablet 4 Sig: take 2 tablets by mouth every morning AND 1TABLET AT NOON AND DINNERTIME Class: Normal  
 [facility] [place], [address] Ph #: [phone] We Performed the Following [address] [[id number] [facility]] MICROALBUMIN, UR, RAND W/ MICROALB/CREAT RATIO [id number] CPT(R)] TX ANNUAL ALCOHOL SCREEN 15 MIN [id number] [facility]] REFERRAL TO ENT-OTOLARYNGOLOGY [UOD10 Custom] Follow-up Instructions Return in about 6 months (around 2/20/2019) for bp, chol, dm, labs prior. To-Do List   
 02/16/2019 Lab:  HEMOGLOBIN A1C WITH EAG   
  
 02/16/2019 Lab:  LIPID PANEL   
  
 02/16/2019 Lab:  METABOLIC PANEL, COMPREHENSIVE Referral Information Referral ID Referred By Referred To  
  
 [id number] [address] [facility] [address] [place] [address] Fax: [phone] Visits Status Start Date End Date 1 New Request 8/20/18 8/20/19  If your referral has a status of pending review or denied, additional information will be sent to support the outcome of this decision. Patient Instructions Medicare Wellness Visit, Female The best way to live healthy is to have a lifestyle where you eat a well-balanced diet, exercise regularly, limit alcohol use, and quit all forms of tobacco/nicotine, if applicable. Regular preventive services are another way to keep healthy. Preventive services (vaccines, screening tests, monitoring & exams) can help personalize your care plan, which helps you manage your own care. Screening tests can find health problems at the earliest stages, when they are easiest to treat. [facility] follows the current, evidence-based guidelines published by the [facility] Rashid Tirsha (USPSTF) when recommending preventive services for our patients. Because we follow these guidelines, sometimes recommendations change over time as research supports it. (For example, mammograms used to be recommended annually. Even though Medicare will still pay for an annual mammogram, the newer guidelines recommend a mammogram every two years for women of average risk.) Of course, you and your doctor may decide to screen more often for some diseases, based on your risk and your health status. Preventive services for you include: - Medicare offers their members a free annual wellness visit, which is time for you and your primary care provider to discuss and plan for your preventive service needs. Take advantage of this benefit every year! 
-All adults over the age of 72 should receive the recommended pneumonia vaccines. Current USPSTF guidelines recommend a series of two vaccines for the best pneumonia protection.  
-All adults should have a flu vaccine yearly and a tetanus vaccine every 10 years.  All adults age 61 and older should receive a shingles vaccine once in their lifetime.   
-A bone mass density test is recommended when a woman turns 65 to screen for osteoporosis. This test is only recommended one time, as a screening. Some providers will use this same test as a disease monitoring tool if you already have osteoporosis. -All adults age 38-68 who are overweight should have a diabetes screening test once every three years.  
-Other screening tests and preventive services for persons with diabetes include: an eye exam to screen for diabetic retinopathy, a kidney function test, a foot exam, and stricter control over your cholesterol.  
-Cardiovascular screening for adults with routine risk involves an electrocardiogram (ECG) at intervals determined by your doctor.  
-Colorectal cancer screenings should be done for adults age 54-65 with no increased risk factors for colorectal cancer. There are a number of acceptable methods of screening for this type of cancer. Each test has its own benefits and drawbacks. Discuss with your doctor what is most appropriate for you during your annual wellness visit. The different tests include: colonoscopy (considered the best screening method), a fecal occult blood test, a fecal DNA test, and sigmoidoscopy. -Breast cancer screenings are recommended every other year for women of normal risk, age 54-69. 
-Cervical cancer screenings for women over age 72 are only recommended with certain risk factors.  
-All adults born between [facility] should be screened once for Hepatitis C. Here is a list of your current Health Maintenance items (your personalized list of preventive services) with a due date: 
Health Maintenance Due Topic Date Due  
 Annual Well Visit  03/14/2018  Albumin Urine Test  08/29/2018 [facility]! Dear Cathy Cantor: Thank you for requesting a [employer] account. Our records indicate that you already have an active [employer] account. You can access your account anytime at [email or web address] [email or web address] Did you know that you can access your hospital and ER discharge instructions at any time in Outline? You can also review all of your test results from your hospital stay or ER visit. Additional Information If you have questions, please visit the Frequently Asked Questions section of the Outline website at [email or web address] [employer]/. Remember, Outline is NOT to be used for urgent needs. For medical emergencies, dial 911. Now available from your iPhone and Android! Please provide this summary of care documentation to your next provider. Your primary care clinician is listed as Drea Grace. If you have any questions after today's visit, please call [phone].

## 2018-08-20 NOTE — PROGRESS NOTES
Carolyn Cowan  Identified pt with two pt identifiers(name and ). Chief Complaint   Patient presents with    Blood Pressure Check     Room 2B//     Cholesterol Problem    Diabetes       1. Have you been to the ER, urgent care clinic since your last visit? Hospitalized since your last visit? NO    2. Have you seen or consulted any other health care providers outside of the [facility] since your last visit? Include any pap smears or colon screening. NO      Provider notified of reason for visit, vitals and flowsheets obtained on patients. Reviewed record In preparation for visit, huddled with provider and have obtained necessary documentation      Health Maintenance Due   Topic    MEDICARE YEARLY EXAM     Influenza Age 5 to Adult     MICROALBUMIN Q1        Wt Readings from Last 3 Encounters:   18 145 lb 9.6 oz (66 kg)   17 150 lb 9.6 oz (68.3 kg)   17 152 lb (68.9 kg)     Temp Readings from Last 3 Encounters:   18 98.1 °F (36.7 °C) (Oral)   17 98 °F (36.7 °C) (Oral)   17 97.6 °F (36.4 °C) (Oral)     BP Readings from Last 3 Encounters:   18 117/73   17 116/68   17 136/78     Pulse Readings from Last 3 Encounters:   18 84   17 84   17 73     There were no vitals filed for this visit.       Learning Assessment:  :     Learning Assessment 2016   PRIMARY LEARNER Patient Patient   HIGHEST LEVEL OF EDUCATION - PRIMARY LEARNER  > 4 YEARS OF COLLEGE -   BARRIERS PRIMARY LEARNER NONE -   CO-LEARNER CAREGIVER No -   PRIMARY LANGUAGE ENGLISH ENGLISH   LEARNER PREFERENCE PRIMARY READING DEMONSTRATION   ANSWERED BY Patient patient   RELATIONSHIP SELF SELF       Depression Screening:  :     PHQ over the last two weeks 2018   Little interest or pleasure in doing things Not at all   Feeling down, depressed, irritable, or hopeless Not at all   Total Score PHQ 2 0       Fall Risk Assessment:  :     Fall Risk Assessment, last 12 mths 8/20/2018   Able to walk? Yes   Fall in past 12 months? No       Abuse Screening:  :     Abuse Screening Questionnaire 8/20/2018 8/24/2016 2/10/2015   Do you ever feel afraid of your partner? N N N   Are you in a relationship with someone who physically or mentally threatens you? N N N   Is it safe for you to go home? Y Y Y       ADL Screening:  :     ADL Assessment 8/20/2018   Feeding yourself No Help Needed   Getting from bed to chair No Help Needed   Getting dressed No Help Needed   Bathing or showering No Help Needed   Walk across the room (includes cane/walker) No Help Needed   Using the telphone No Help Needed   Taking your medications No Help Needed   Preparing meals No Help Needed   Managing money (expenses/bills) No Help Needed   Moderately strenuous housework (laundry) No Help Needed   Shopping for personal items (toiletries/medicines) No Help Needed   Shopping for groceries No Help Needed   Driving No Help Needed   Climbing a flight of stairs No Help Needed   Getting to places beyond walking distances No Help Needed         Medication reconciliation up to date and corrected with patient at this time.

## 2018-08-20 NOTE — PROGRESS NOTES
HPI  Ms. Maame Bagley is a 74y.o. year old female, she is seen today for follow up HTN, high cholesterol, DM. Denies symptoms of hypoglycemia. Didn't bring glucose log - checks 3-4 x per day - normally about 110 in the morning. Highest she remembers is 300 - very unusual.  Has had some dietary indiscretions with more carbs, now back to low carb diet and glucose improved. No chest pain, sob, dizziness weakness. Has been seeing accupuncturist for chronic right shoulder pain - has also seen Dr. Jose Diaz and PT for same. Notes accupuncture has helped. Chief Complaint   Patient presents with    Blood Pressure Check     Room 2B// NON fasting // seeing Accupuncturist    Cholesterol Problem    Diabetes     would like 1 year supply of Metformin and syringes sent into pharmacy as 80 day supplies    [employer] Visit        Prior to Admission medications    Medication Sig Start Date End Date Taking?  Authorizing Provider   Insulin Syringe-Needle U-100 (BD INSULIN SYRINGE ULTRA-FINE) 0.3 mL 31 gauge x 5/16 syrg Use as directed twice a day 8/20/18  Yes Debra Nunez MD   metFORMIN (GLUCOPHAGE) 500 mg tablet take 2 tablets by mouth every morning AND 1TABLET AT [address] 8/20/18  Yes Debra Nunez MD   losartan (COZAAR) 50 mg tablet take 1 tablet by mouth once daily 7/26/18  Yes Debra Nunez MD   LANTUS U-100 INSULIN 100 unit/mL injection INJECT 11 UNITS EVERY MORNING AND 3 UNITS EVERY EVENING FOR DIABETES CONTROL 5/28/18  Yes Debra Nunez MD   Lancets [facility] ULTRASOFT LANCETS) misc USE TO TEST BLOOD SUGAR THREE TIMES A DAY DX E11.9 4/9/18  Yes Debra Nunez MD   glucose blood VI test strips (ONETOUCH ULTRA TEST) strip USE TO TEST BLOOD SUGAR THREE TIMES A DAY DX E11.9 12/5/17  Yes Debra Nunez MD   spironolactone (ALDACTONE) 50 mg tablet take 1 tablet by mouth once daily 8/4/17  Yes Debra Nunez MD   Calcium Carbonate-Vit D3-Min (CALTRATE 600+D PLUS MINERALS) 600 mg (1,500 mg)-400 unit Chew Take  by mouth daily. 6/27/12  Yes Pam Womack MD   multivitamins-minerals-lutein (CENTRUM SILVER) Tab Take  by mouth. Yes Historical Provider   cholecalciferol, vitamin d3, (VITAMIN D) 1,000 unit tablet Take  by mouth daily. Yes Historical Provider         Allergies   Allergen Reactions    Lisinopril Cough    Simvastatin Myalgia         REVIEW OF SYSTEMS:  Per HPI    PHYSICAL EXAM:  Visit Vitals    /69 (BP 1 Location: Left arm, BP Patient Position: Sitting)    Pulse 72    Temp 97.9 °F (36.6 °C) (Oral)    Resp 16    Ht 5' 6\" (1.676 m)    Wt 149 lb 9.6 oz (67.9 kg)    LMP  (LMP Unknown)    SpO2 97%    BMI 24.15 kg/m2     Constitutional: Appears well-developed and well-nourished. No distress. HENT:   Head: Normocephalic and atraumatic. Eyes: No scleral icterus. Cardiovascular: Normal S1/S2, regular rhythm. No murmurs, rubs, or gallops. Pulmonary/Chest: Effort normal and breath sounds normal. No respiratory distress. No wheezes, rhonchi, or rales. Ext: No edema. Neurological: Alert. Psychiatric: Normal mood and affect. Behavior is normal.     Lab Results   Component Value Date/Time    Sodium 142 08/15/2018 08:13 AM    Potassium 4.5 08/15/2018 08:13 AM    Chloride 103 08/15/2018 08:13 AM    CO2 22 08/15/2018 08:13 AM    Anion gap 9 11/01/2010 08:07 AM    Glucose 93 08/15/2018 08:13 AM    BUN 17 08/15/2018 08:13 AM    Creatinine 0.76 08/15/2018 08:13 AM    BUN/Creatinine ratio 22 08/15/2018 08:13 AM    GFR est AA 89 08/15/2018 08:13 AM    GFR est non-AA 78 08/15/2018 08:13 AM    Calcium 9.2 08/15/2018 08:13 AM    Bilirubin, total 0.4 08/15/2018 08:13 AM    AST (SGOT) 23 08/15/2018 08:13 AM    Alk.  phosphatase 52 08/15/2018 08:13 AM    Protein, total 6.2 08/15/2018 08:13 AM    Albumin 4.2 08/15/2018 08:13 AM    Globulin 2.4 11/01/2010 08:07 AM    A-G Ratio 2.1 08/15/2018 08:13 AM    ALT (SGPT) 13 08/15/2018 08:13 AM     Lab Results Component Value Date/Time    Hemoglobin A1c 7.2 (H) 08/15/2018 08:13 AM    Hemoglobin A1c 6.8 (H) 02/12/2018 08:33 AM    Hemoglobin A1c 6.9 (H) 08/22/2017 08:11 AM      Lab Results   Component Value Date/Time    Cholesterol, total 167 08/15/2018 08:13 AM    HDL Cholesterol 79 08/15/2018 08:13 AM    LDL, calculated 74 08/15/2018 08:13 AM    VLDL, calculated 14 08/15/2018 08:13 AM    Triglyceride 70 08/15/2018 08:13 AM    CHOL/HDL Ratio 1.5 11/01/2010 08:07 AM          ASSESSMENT/PLAN  Diagnoses and all orders for this visit:    1. Medicare annual wellness visit, subsequent    2. Type 2 diabetes mellitus without complication, with long-term current use of insulin (HCC)  -     Insulin Syringe-Needle U-100 (BD INSULIN SYRINGE ULTRA-FINE) 0.3 mL 31 gauge x 5/16 syrg; Use as directed twice a day  -     MICROALBUMIN, UR, RAND W/ MICROALB/CREAT RATIO  -     metFORMIN (GLUCOPHAGE) 500 mg tablet; take 2 tablets by mouth every morning AND 1TABLET AT NOON AND DINNERTIME  -     HEMOGLOBIN A1C WITH EAG; Future  a1c higher but still in range - continue current medications   3. Hyperlipidemia, unspecified hyperlipidemia type  -     METABOLIC PANEL, COMPREHENSIVE; Future  -     LIPID PANEL; Future  Excellent control without medications  4. Advanced directives, counseling/discussion    5. Screening for alcoholism  -     Annual  Alcohol Screen 15 min ()    6. Screening for depression  -     Depression Screen Annual    7. Hearing loss, unspecified hearing loss type, unspecified laterality  -     REFERRAL TO ENT-OTOLARYNGOLOGY          Health Maintenance Due   Topic Date Due    MICROALBUMIN Q1  08/29/2018        Follow-up Disposition:  Return in about 6 months (around 2/20/2019) for bp, chol, dm, labs prior. Reviewed plan of care. Patient has provided input and agrees with goals.      The nurse provided the patient and/or family with advanced directive information if needed and encouraged the patient to provide a copy to the office when available. This is the Subsequent Medicare Annual Wellness Exam, performed 12 months or more after the Initial AWV or the last Subsequent AWV    I have reviewed the patient's medical history in detail and updated the computerized patient record.      History     Past Medical History:   Diagnosis Date    Cancer ([address]     skin     Depression     mild with occasional use of prozac    Diabetes (HCC)     Nausea & vomiting     Psychiatric disorder     depression      Past Surgical History:   Procedure Laterality Date    BREAST SURGERY PROCEDURE UNLISTED      left breast cystectomy    COLONOSCOPY N/A 11/16/2016    COLONOSCOPY performed by Cherelle Haskins MD at [address]  11/16/2016         HX BREAST BIOPSY Left     benign cyst removed    HX HYSTERECTOMY      HX ORTHOPAEDIC      right hip arthroscopy - Dr. Francisco Mckenzie HX OTHER SURGICAL      right leg varicous vein removed    HX EDMUNDO AND BSO  1992     paps  wnl fibroids    HX TONSILLECTOMY      [place] COLSC FLX W/REMOVAL LESION BY HOT BX FORCEPS  8/22/2011          Current Outpatient Prescriptions   Medication Sig Dispense Refill    Insulin Syringe-Needle U-100 (BD INSULIN SYRINGE ULTRA-FINE) 0.3 mL 31 gauge x 5/16 syrg Use as directed twice a day 60 Syringe 11    metFORMIN (GLUCOPHAGE) 500 mg tablet take 2 tablets by mouth every morning AND 1TABLET AT NOON AND DINNERTIME 360 Tab 4    losartan (COZAAR) 50 mg tablet take 1 tablet by mouth once daily 90 Tab 3    LANTUS U-100 INSULIN 100 unit/mL injection INJECT 11 UNITS EVERY MORNING AND 3 UNITS EVERY EVENING FOR DIABETES CONTROL 10 mL 10    Lancets (ONETOUCH ULTRASOFT LANCETS) misc USE TO TEST BLOOD SUGAR THREE TIMES A DAY DX E11.9 360 Each 3    glucose blood VI test strips (ONETOUCH ULTRA TEST) strip USE TO TEST BLOOD SUGAR THREE TIMES A DAY DX E11.9 100 Strip 11    spironolactone (ALDACTONE) 50 mg tablet take 1 tablet by mouth once daily 30 Tab 11    Calcium Carbonate-Vit D3-Min (CALTRATE 600+D PLUS MINERALS) 600 mg (1,500 mg)-400 unit Chew Take  by mouth daily.  multivitamins-minerals-lutein (CENTRUM SILVER) Tab Take  by mouth.  cholecalciferol, vitamin d3, (VITAMIN D) 1,000 unit tablet Take  by mouth daily. Allergies   Allergen Reactions    Lisinopril Cough    Simvastatin Myalgia     Family History   Problem Relation Age of Onset    Hypertension Brother      Social History   Substance Use Topics    Smoking status: Never Smoker    Smokeless tobacco: Never Used    Alcohol use 0.0 oz/week      Comment: occassionally     Patient Active Problem List   Diagnosis Code    [facility] E11.9    Colon polyps K63.5    Compression fracture of thoracic vertebra (HCC) S22.000A    Right hip pain M25.551    Hyperlipidemia E78.5    Tinea unguium B35.1    Skin cancer C44.90    Hip pain, left M25.552    Osteoporosis M81.0    Dense breast R92.2       Depression Risk Factor Screening:     PHQ over the last two weeks 8/20/2018   Little interest or pleasure in doing things Not at all   Feeling down, depressed, irritable, or hopeless Not at all   Total Score PHQ 2 0     Alcohol Risk Factor Screening: You do not drink alcohol or very rarely. Functional Ability and Level of Safety:   Hearing Loss  The patient needs further evaluation. Activities of Daily Living  The home contains: handrails and grab bars  Patient does total self care    Fall Risk  Fall Risk Assessment, last 12 mths 8/20/2018   Able to walk? Yes   Fall in past 12 months?  No       Abuse Screen  Patient is not abused    Cognitive Screening   Evaluation of Cognitive Function:  Has your family/caregiver stated any concerns about your memory: no  Normal    Patient Care Team   Patient Care Team:  Jimmie Roldan MD as PCP - General (Internal Medicine)    Assessment/Plan   Education and counseling provided:  Are appropriate based on today's review and evaluation    Diagnoses and all orders for this visit:    1. Medicare annual wellness visit, subsequent    2. Type 2 diabetes mellitus without complication, with long-term current use of insulin (HCC)  -     Insulin Syringe-Needle U-100 (BD INSULIN SYRINGE ULTRA-FINE) 0.3 mL 31 gauge x 5/16 syrg; Use as directed twice a day  -     MICROALBUMIN, UR, RAND W/ MICROALB/CREAT RATIO  -     metFORMIN (GLUCOPHAGE) 500 mg tablet; take 2 tablets by mouth every morning AND 1TABLET AT NOON AND DINNERTIME  -     HEMOGLOBIN A1C WITH EAG; Future    3. Hyperlipidemia, unspecified hyperlipidemia type  -     METABOLIC PANEL, COMPREHENSIVE; Future  -     LIPID PANEL; Future    4. Advanced directives, counseling/discussion    5. Screening for alcoholism  -     Annual  Alcohol Screen 15 min ()    6. Screening for depression  -     Depression Screen Annual    7.  Hearing loss, unspecified hearing loss type, unspecified laterality  -     REFERRAL TO ENT-OTOLARYNGOLOGY        Health Maintenance Due   Topic Date Due    MICROALBUMIN Q1  08/29/2018

## 2018-08-20 NOTE — PATIENT INSTRUCTIONS
Medicare Wellness Visit, Female     The best way to live healthy is to have a lifestyle where you eat a well-balanced diet, exercise regularly, limit alcohol use, and quit all forms of tobacco/nicotine, if applicable. Regular preventive services are another way to keep healthy. Preventive services (vaccines, screening tests, monitoring & exams) can help personalize your care plan, which helps you manage your own care. Screening tests can find health problems at the earliest stages, when they are easiest to treat. [facility] follows the current, evidence-based guidelines published by the [facility] Rashid Trisha ([facility]) when recommending preventive services for our patients. Because we follow these guidelines, sometimes recommendations change over time as research supports it. (For example, mammograms used to be recommended annually. Even though Medicare will still pay for an annual mammogram, the newer guidelines recommend a mammogram every two years for women of average risk.)  Of course, you and your doctor may decide to screen more often for some diseases, based on your risk and your health status. Preventive services for you include:  - Medicare offers their members a free annual wellness visit, which is time for you and your primary care provider to discuss and plan for your preventive service needs. Take advantage of this benefit every year!  -All adults over the age of 72 should receive the recommended pneumonia vaccines. Current USPSTF guidelines recommend a series of two vaccines for the best pneumonia protection.   -All adults should have a flu vaccine yearly and a tetanus vaccine every 10 years. All adults age 61 and older should receive a shingles vaccine once in their lifetime.    -A bone mass density test is recommended when a woman turns 65 to screen for osteoporosis. This test is only recommended one time, as a screening.  Some providers will use this same test as a disease monitoring tool if you already have osteoporosis. -All adults age 38-68 who are overweight should have a diabetes screening test once every three years.   -Other screening tests and preventive services for persons with diabetes include: an eye exam to screen for diabetic retinopathy, a kidney function test, a foot exam, and stricter control over your cholesterol.   -Cardiovascular screening for adults with routine risk involves an electrocardiogram (ECG) at intervals determined by your doctor.   -Colorectal cancer screenings should be done for adults age 54-65 with no increased risk factors for colorectal cancer. There are a number of acceptable methods of screening for this type of cancer. Each test has its own benefits and drawbacks. Discuss with your doctor what is most appropriate for you during your annual wellness visit. The different tests include: colonoscopy (considered the best screening method), a fecal occult blood test, a fecal DNA test, and sigmoidoscopy. -Breast cancer screenings are recommended every other year for women of normal risk, age 54-69.  -Cervical cancer screenings for women over age 72 are only recommended with certain risk factors.   -All adults born between [facility] should be screened once for Hepatitis C.      Here is a list of your current Health Maintenance items (your personalized list of preventive services) with a due date:  Health Maintenance Due   Topic Date Due    Annual Well Visit  03/14/2018    Albumin Urine Test  08/29/2018

## 2018-08-21 LAB
ALBUMIN/CREAT UR: 2.4 MG/G CREAT (ref 0–30)
CREAT UR-MCNC: 127 MG/DL
MICROALBUMIN UR-MCNC: 3 UG/ML

## 2018-08-21 RX ORDER — SPIRONOLACTONE 50 MG/1
TABLET, FILM COATED ORAL
Qty: 30 TAB | Refills: 11 | Status: SHIPPED | OUTPATIENT
Start: 2018-08-21 | End: 2019-07-08 | Stop reason: SDUPTHER

## 2019-02-13 ENCOUNTER — OFFICE VISIT (OUTPATIENT)
Dept: INTERNAL MEDICINE CLINIC | Facility: CLINIC | Age: 74
End: 2019-02-13

## 2019-02-13 VITALS
BODY MASS INDEX: 23.37 KG/M2 | TEMPERATURE: 97.6 F | WEIGHT: 145.4 LBS | HEIGHT: 66 IN | OXYGEN SATURATION: 98 % | SYSTOLIC BLOOD PRESSURE: 118 MMHG | HEART RATE: 83 BPM | RESPIRATION RATE: 16 BRPM | DIASTOLIC BLOOD PRESSURE: 76 MMHG

## 2019-02-13 DIAGNOSIS — M54.50 CHRONIC MIDLINE LOW BACK PAIN WITHOUT SCIATICA: ICD-10-CM

## 2019-02-13 DIAGNOSIS — E11.9 TYPE 2 DIABETES MELLITUS WITHOUT COMPLICATION, WITH LONG-TERM CURRENT USE OF INSULIN (HCC): Primary | ICD-10-CM

## 2019-02-13 DIAGNOSIS — G89.29 CHRONIC MIDLINE LOW BACK PAIN WITHOUT SCIATICA: ICD-10-CM

## 2019-02-13 DIAGNOSIS — K63.5 POLYP OF COLON, UNSPECIFIED PART OF COLON, UNSPECIFIED TYPE: ICD-10-CM

## 2019-02-13 DIAGNOSIS — E78.5 HYPERLIPIDEMIA, UNSPECIFIED HYPERLIPIDEMIA TYPE: ICD-10-CM

## 2019-02-13 DIAGNOSIS — Z79.4 TYPE 2 DIABETES MELLITUS WITHOUT COMPLICATION, WITH LONG-TERM CURRENT USE OF INSULIN (HCC): Primary | ICD-10-CM

## 2019-02-13 NOTE — PROGRESS NOTES
Vj Macedo  Identified pt with two pt identifiers(name and ). Chief Complaint Patient presents with  Follow-up  Cholesterol Problem  Blood Pressure Check  Diabetes  LOW BACK PAIN Reviewed record In preparation for visit and have obtained necessary documentation. Advanced directive / living will on file. 1. Have you been to the ER, urgent care clinic or hospitalized since your last visit? No  
 
2. Have you seen or consulted any other health care providers outside of the [address] [place] [address] since your last visit? Include any pap smears or colon screening. No 
 
Vitals reviewed with provider. Health Maintenance reviewed:  
 
Health Maintenance Due Topic  Shingrix Vaccine Age 50> (1 of 2)  FOOT EXAM Q1 Wt Readings from Last 3 Encounters:  
18 149 lb 9.6 oz (67.9 kg) 18 145 lb 9.6 oz (66 kg) 17 150 lb 9.6 oz (68.3 kg) Temp Readings from Last 3 Encounters:  
18 97.9 °F (36.6 °C) (Oral) 18 98.1 °F (36.7 °C) (Oral) 17 98 °F (36.7 °C) (Oral) BP Readings from Last 3 Encounters:  
18 114/69  
18 117/73  
17 116/68 Pulse Readings from Last 3 Encounters:  
18 72  
18 84  
17 84 Vitals:  
 19 6262 PainSc:   0 - No pain Learning Assessment:  
:  
 
 
Learning Assessment 2016 PRIMARY LEARNER Patient Patient HIGHEST LEVEL OF EDUCATION - PRIMARY LEARNER  > 4 YEARS OF COLLEGE -  
BARRIERS PRIMARY LEARNER NONE -  
CO-LEARNER CAREGIVER No - PRIMARY LANGUAGE ENGLISH ENGLISH  
LEARNER PREFERENCE PRIMARY READING DEMONSTRATION  
ANSWERED BY Patient patient RELATIONSHIP SELF SELF Depression Screening:  
:  
 
 
3 most recent PHQ Screens 2019 Little interest or pleasure in doing things Not at all Feeling down, depressed, irritable, or hopeless Not at all Total Score PHQ 2 0 Fall Risk Assessment:  
:  
 Fall Risk Assessment, last 12 mths 8/20/2018 Able to walk? Yes Fall in past 12 months? No  
  
 
Abuse Screening:  
:  
 
 
Abuse Screening Questionnaire 8/20/2018 8/24/2016 2/10/2015 Do you ever feel afraid of your partner? N N N Are you in a relationship with someone who physically or mentally threatens you? N N N Is it safe for you to go home? Sudha Valdes  
  
 
ADL Screening:  
:  
 
 

## 2019-02-13 NOTE — PROGRESS NOTES
HPI Ms. Thais Rowell is a 74y.o. year old female, she is seen today for follow up high cholesterol, DM, BP. Also complaint of back pain. Had been traveling to [facility] since last visit - glucose was higher over vacation for 2.5 weeks. Was ill after [place] with diarrhea and glucose in the 200s range. Glucose lately has been much better - 93 this morning. Says she is eating less sweets now. Checks glucose 3 x per day - glucose about 120 before dinner, before lunch 120-180. Glucose is very diet dependent. No chest pain, sob, dizziness, weakness. Says about 13 years ago fell off horse, had films long after and had compression fracture in spine. Notes pain if leaning over to wash dishes, bending. Once upright no more pain, no pain with lying down. No weakness, numbness, tingling in legs. In the past year has noticed low back discomfort, getting worse. Hasn't needed anything for pain, doesn't hurt daily. Activities limited due to back pain, vacuuming makes it worse. No n/v/abd pain, no melena or brbpr. Chief Complaint Patient presents with  Follow-up  Cholesterol Problem  Blood Pressure Check  Diabetes  LOW BACK PAIN Prior to Admission medications Medication Sig Start Date End Date Taking?  Authorizing Provider  
spironolactone (ALDACTONE) 50 mg tablet take 1 tablet by mouth once daily 8/21/18  Yes Faviola Frost MD  
Insulin Syringe-Needle U-100 (BD INSULIN SYRINGE ULTRA-FINE) 0.3 mL 31 gauge x 5/16 syrg Use as directed twice a day 8/20/18  Yes Faviola Frost MD  
metFORMIN (GLUCOPHAGE) 500 mg tablet take 2 tablets by mouth every morning AND 1TABLET AT NOON AND DINNERTIME 8/20/18  Yes Faviola Frost MD  
losartan (COZAAR) 50 mg tablet take 1 tablet by mouth once daily 7/26/18  Yes Faviola Frost MD  
LANTUS U-100 INSULIN 100 unit/mL injection INJECT 11 UNITS EVERY MORNING AND 3 UNITS EVERY EVENING FOR DIABETES CONTROL 5/28/18  Yes Brook Gaines Becca Mclaughlin MD  
Lancets [facility] ULTRASOFT LANCETS) misc USE TO TEST BLOOD SUGAR THREE TIMES A DAY DX E11.9 4/9/18  Yes George Fuentes MD  
glucose blood VI test strips (ONETOUCH ULTRA TEST) strip USE TO TEST BLOOD SUGAR THREE TIMES A DAY DX E11.9 12/5/17  Yes George Fuentes MD  
Calcium Carbonate-Vit D3-Min (CALTRATE 600+D PLUS MINERALS) 600 mg (1,500 mg)-400 unit Chew Take  by mouth daily. 6/27/12  Yes Lori Varghese MD  
multivitamins-minerals-lutein (CENTRUM SILVER) Tab Take  by mouth. Yes Provider, Historical  
cholecalciferol, vitamin d3, (VITAMIN D) 1,000 unit tablet Take  by mouth daily. Yes Provider, Historical  
 
 
 
Allergies Allergen Reactions  Lisinopril Cough  Simvastatin Myalgia REVIEW OF SYSTEMS: 
Per HPI PHYSICAL EXAM: 
Visit Vitals /76 Pulse 83 Temp 97.6 °F (36.4 °C) (Oral) Resp 16 Ht 5' 6\" (1.676 m) Wt 145 lb 6.4 oz (66 kg) LMP  (LMP Unknown) SpO2 98% BMI 23.47 kg/m² Constitutional: Appears well-developed and well-nourished. No distress. HENT:  
Head: Normocephalic and atraumatic. Eyes: No scleral icterus. Neck: no lad, no tm, supple Cardiovascular: Normal S1/S2, regular rhythm. No murmurs, rubs, or gallops. Pulmonary/Chest: Effort normal and breath sounds normal. No respiratory distress. No wheezes, rhonchi, or rales. Abdomen: Soft, NT/ND, +BS, no rebound or guarding, no masses, no HSM appreciated. Back: FROM, no pain on palpation Ext: No edema. Neurological: Alert. Strength 5/5 b/l LE, SLR negative b/l Psychiatric: Normal mood and affect. Behavior is normal. 
 
DM foot exam: 2+ pedal pulse on left, 1+ on right, no lesions, sensation intact to monofilament b/l Lab Results Component Value Date/Time  Sodium 141 02/06/2019 08:36 AM  
 Potassium 4.4 02/06/2019 08:36 AM  
 Chloride 102 02/06/2019 08:36 AM  
 CO2 24 02/06/2019 08:36 AM  
 Anion gap 9 11/01/2010 08:07 AM  
 Glucose 138 (H) 02/06/2019 08:36 AM  
 BUN 15 02/06/2019 08:36 AM  
 Creatinine 0.80 02/06/2019 08:36 AM  
 BUN/Creatinine ratio 19 02/06/2019 08:36 AM  
 GFR est AA 83 02/06/2019 08:36 AM  
 GFR est non-AA 72 02/06/2019 08:36 AM  
 Calcium 9.4 02/06/2019 08:36 AM  
 Bilirubin, total 0.4 02/06/2019 08:36 AM  
 AST (SGOT) 17 02/06/2019 08:36 AM  
 Alk. phosphatase 57 02/06/2019 08:36 AM  
 Protein, total 6.4 02/06/2019 08:36 AM  
 Albumin 4.1 02/06/2019 08:36 AM  
 Globulin 2.4 11/01/2010 08:07 AM  
 A-G Ratio 1.8 02/06/2019 08:36 AM  
 ALT (SGPT) 11 02/06/2019 08:36 AM  
 
Lab Results Component Value Date/Time Hemoglobin A1c 7.6 (H) 02/06/2019 08:36 AM  
 Hemoglobin A1c 7.2 (H) 08/15/2018 08:13 AM  
 Hemoglobin A1c 6.8 (H) 02/12/2018 08:33 AM  
  
Lab Results Component Value Date/Time Cholesterol, total 166 02/06/2019 08:36 AM  
 HDL Cholesterol 80 02/06/2019 08:36 AM  
 LDL, calculated 74 02/06/2019 08:36 AM  
 VLDL, calculated 12 02/06/2019 08:36 AM  
 Triglyceride 61 02/06/2019 08:36 AM  
 CHOL/HDL Ratio 1.5 11/01/2010 08:07 AM  
  
 
 
ASSESSMENT/PLAN Diagnoses and all orders for this visit: 
 
1. Type 2 diabetes mellitus without complication, with long-term current use of insulin ([facility]) 
-      DIABETES FOOT EXAM 
a1c higher but glucose improving - continue current medications, low carb diet 2. Hyperlipidemia, unspecified hyperlipidemia type At goal - continue current medications 3. Polyp of colon, unspecified part of colon, unspecified type Repeat due 2021 4. Chronic midline low back pain without sciatica -     XR SPINE LUMB 2 OR 3 V; Future 
-     REFERRAL TO PHYSICAL THERAPY Suspect DJD - check films, refer to PT Health Maintenance Due Topic Date Due  Shingrix Vaccine Age 50> (1 of 2) 08/18/1993  
 FOOT EXAM Q1  02/19/2019 Follow-up Disposition: 
Return in about 3 months (around 5/13/2019) for dm - poc a1c. Reviewed plan of care. Patient has provided input and agrees with goals. The nurse provided the patient and/or family with advanced directive information if needed and encouraged the patient to provide a copy to the office when available.

## 2019-04-16 RX ORDER — INSULIN GLARGINE 100 [IU]/ML
INJECTION, SOLUTION SUBCUTANEOUS
Qty: 10 ML | Refills: 10 | Status: SHIPPED | OUTPATIENT
Start: 2019-04-16 | End: 2019-05-16 | Stop reason: SDUPTHER

## 2019-04-16 NOTE — TELEPHONE ENCOUNTER
Refill       Requested Prescriptions     Pending Prescriptions Disp Refills    insulin glargine (LANTUS U-100 INSULIN) 100 unit/mL injection 10 mL 10

## 2019-05-16 ENCOUNTER — TELEPHONE (OUTPATIENT)
Dept: INTERNAL MEDICINE CLINIC | Facility: CLINIC | Age: 74
End: 2019-05-16

## 2019-05-16 ENCOUNTER — OFFICE VISIT (OUTPATIENT)
Dept: INTERNAL MEDICINE CLINIC | Facility: CLINIC | Age: 74
End: 2019-05-16

## 2019-05-16 VITALS
BODY MASS INDEX: 23.11 KG/M2 | RESPIRATION RATE: 16 BRPM | DIASTOLIC BLOOD PRESSURE: 66 MMHG | OXYGEN SATURATION: 95 % | TEMPERATURE: 98.1 F | HEART RATE: 91 BPM | WEIGHT: 143.8 LBS | HEIGHT: 66 IN | SYSTOLIC BLOOD PRESSURE: 120 MMHG

## 2019-05-16 DIAGNOSIS — J01.00 ACUTE NON-RECURRENT MAXILLARY SINUSITIS: ICD-10-CM

## 2019-05-16 DIAGNOSIS — H10.33 ACUTE CONJUNCTIVITIS OF BOTH EYES, UNSPECIFIED ACUTE CONJUNCTIVITIS TYPE: ICD-10-CM

## 2019-05-16 DIAGNOSIS — Z79.4 TYPE 2 DIABETES MELLITUS WITHOUT COMPLICATION, WITH LONG-TERM CURRENT USE OF INSULIN (HCC): Primary | ICD-10-CM

## 2019-05-16 DIAGNOSIS — E11.9 TYPE 2 DIABETES MELLITUS WITHOUT COMPLICATION, WITH LONG-TERM CURRENT USE OF INSULIN (HCC): Primary | ICD-10-CM

## 2019-05-16 LAB — HBA1C MFR BLD HPLC: 7.5 %

## 2019-05-16 RX ORDER — POLYMYXIN B SULFATE AND TRIMETHOPRIM 1; 10000 MG/ML; [USP'U]/ML
1 SOLUTION OPHTHALMIC EVERY 4 HOURS
Qty: 1 BOTTLE | Refills: 0 | Status: SHIPPED | OUTPATIENT
Start: 2019-05-16 | End: 2019-09-05 | Stop reason: ALTCHOICE

## 2019-05-16 RX ORDER — INSULIN GLARGINE 100 [IU]/ML
INJECTION, SOLUTION SUBCUTANEOUS
Qty: 10 ML | Refills: 10 | Status: SHIPPED | OUTPATIENT
Start: 2019-05-16 | End: 2020-03-30

## 2019-05-16 RX ORDER — AMOXICILLIN 500 MG/1
1000 CAPSULE ORAL 2 TIMES DAILY
Qty: 40 CAP | Refills: 0 | Status: SHIPPED | OUTPATIENT
Start: 2019-05-16 | End: 2019-05-26

## 2019-05-16 NOTE — PATIENT INSTRUCTIONS
Pinkeye: Care Instructions Your Care Instructions Pinkeye is redness and swelling of the eye surface and the conjunctiva (the lining of the eyelid and the covering of the white part of the eye). Pinkeye is also called conjunctivitis. Pinkeye is often caused by infection with bacteria or a virus. Dry air, allergies, smoke, and chemicals are other common causes. Pinkeye often clears on its own in 7 to 10 days. Antibiotics only help if the pinkeye is caused by bacteria. Pinkeye caused by infection spreads easily. If an allergy or chemical is causing pinkeye, it will not go away unless you can avoid whatever is causing it. Follow-up care is a key part of your treatment and safety. Be sure to make and go to all appointments, and call your doctor if you are having problems. It's also a good idea to know your test results and keep a list of the medicines you take. How can you care for yourself at home? · Wash your hands often. Always wash them before and after you treat pinkeye or touch your eyes or face. · Use moist cotton or a clean, wet cloth to remove crust. Wipe from the inside corner of the eye to the outside. Use a clean part of the cloth for each wipe. · Put cold or warm wet cloths on your eye a few times a day if the eye hurts. · Do not wear contact lenses or eye makeup until the pinkeye is gone. Throw away any eye makeup you were using when you got pinkeye. Clean your contacts and storage case. If you wear disposable contacts, use a new pair when your eye has cleared and it is safe to wear contacts again. · If the doctor gave you antibiotic ointment or eyedrops, use them as directed. Use the medicine for as long as instructed, even if your eye starts looking better soon. Keep the bottle tip clean, and do not let it touch the eye area. · To put in eyedrops or ointment: ? Tilt your head back, and pull your lower eyelid down with one finger. ? Drop or squirt the medicine inside the lower lid. ? Close your eye for 30 to 60 seconds to let the drops or ointment move around. ? Do not touch the ointment or dropper tip to your eyelashes or any other surface. · Do not share towels, pillows, or washcloths while you have pinkeye. When should you call for help? Call your doctor now or seek immediate medical care if: 
  · You have pain in your eye, not just irritation on the surface.  
  · You have a change in vision or loss of vision.  
  · You have an increase in discharge from the eye.  
  · Your eye has not started to improve or begins to get worse within 48 hours after you start using antibiotics.  
  · Pinkeye lasts longer than 7 days.  
 Watch closely for changes in your health, and be sure to contact your doctor if you have any problems. Where can you learn more? Go to http://bhargavi-sagar.info/. Enter Y392 in the search box to learn more about \"Pinkeye: Care Instructions. \" Current as of: September 23, 2018 Content Version: 11.9 © 0331-8969 [employer]. Care instructions adapted under license by [facility] (which disclaims liability or warranty for this information). If you have questions about a medical condition or this instruction, always ask your healthcare professional. [address] any warranty or liability for your use of this information.

## 2019-05-16 NOTE — PROGRESS NOTES
Dave PURCELL Servando Tolbert. Chief Complaint   Patient presents with    Leg Pain       History and Physical    Dave Johnson. is a 57 y.o. male with bilateral lower extremity claudication. Patient recently had left lower extremity atherectomy and balloon angioplasty of the superficial femoral artery and above-knee popliteal artery. Patient has done extraordinarily well from that procedure. He states that all of his claudication has fully resolved and he has no residual discomfort within his left lower extremity. He does continue to have his claudication is lifestyle limiting on the right lower extremity. No rest pain or ulcerations no fevers or chills. Past Medical History:   Diagnosis Date    Arthritis     Cancer of bone ([address]     COPD     Heart failure (HCC)     stents x2 in 2008    Hypertension      Past Surgical History:   Procedure Laterality Date    CARDIAC SURG PROCEDURE UNLIST      COLONOSCOPY N/A 8/15/2016    COLONOSCOPY with polypectomy, biopsy and clip performed by Robert Ying MD at [address]   6-5-13    HX HEART CATHETERIZATION  6-5-13    REPAIR ING HERNIA,5+Y/O,REDUCIBL Right 12/05/2017    Dr. Guanako Torres     Patient Active Problem List   Diagnosis Code    Sepsis(995.91) A41.9    Immunocompromised state ([address] D84.9    Anemia, unspecified D64.9    Bone cancer ([address] C41.9    PAD (peripheral artery disease) ([address] I73.9    Aortic ectasia, abdominal ([address] I77.811    Iliac artery aneurysm, left ([address] I72.3    Right inguinal hernia K40.90    Atherosclerosis of native artery of both lower extremities with intermittent claudication (HCC) I70.213     Current Outpatient Prescriptions   Medication Sig Dispense Refill    hydroxychloroquine (PLAQUENIL) 200 mg tablet Take 200 mg by mouth daily.  ketorolac (TORADOL) 10 mg tablet Take 1 Tab by mouth every six (6) hours as needed for Pain.  12 Tab 0    leflunomide (ARAVA) 20 mg tablet Take 20 mg by mouth HPI Ms. Clara Scott is a 74y.o. year old female, she is seen today for follow up DM - A1C last visit 3 mos ago was 7.6 and today is 7.5. Says her glucose readings have been improved overall improved until allergy symptoms flared, also sinus infection symptoms. Glucose had been  fasting, in evening around 200 but during day 100s. Added more exercise until past few weeks when she hasn't been feeling as well - walking at [facility] for about 30 min one extra day per week. Complains of HA, maxillary sinus pressure, post nasal drip, nasal congestion, cough with and without sputum, eyes red, crusted in morning and watery. No fevers. No sob. Has had off and on for the past month. Has been taking cough syrup at night, tried claritin which helps some. Chief Complaint Patient presents with  Diabetes Room 2A// NON fasting  Allergic Rhinitis  
  seasonal allergies, eye redness, itching Prior to Admission medications Medication Sig Start Date End Date Taking? Authorizing Provider  
insulin glargine (LANTUS U-100 INSULIN) 100 unit/mL injection INJECT 14 UNITS EVERY MORNING AND 3 UNITS EVERY EVENING FOR DIABETES CONTROL 5/16/19  Yes Marry Montoya MD  
amoxicillin (AMOXIL) 500 mg capsule Take 2 Caps by mouth two (2) times a day for 10 days. 5/16/19 5/26/19 Yes Marry Montoya MD  
trimethoprim-polymyxin b (POLYTRIM) ophthalmic solution Administer 1 Drop to both eyes every four (4) hours.  5/16/19  Yes Marry Montoya MD  
spironolactone (ALDACTONE) 50 mg tablet take 1 tablet by mouth once daily 8/21/18  Yes Marry Montoya MD  
Insulin Syringe-Needle U-100 (BD INSULIN SYRINGE ULTRA-FINE) 0.3 mL 31 gauge x 5/16 syrg Use as directed twice a day 8/20/18  Yes Marry Montoya MD  
metFORMIN (GLUCOPHAGE) 500 mg tablet take 2 tablets by mouth every morning AND 1TABLET AT [address] 8/20/18  Yes Marry Montoya MD  
 daily.      predniSONE (DELTASONE) 5 mg tablet Take  by mouth daily. Take 1 to 3 tabs daily      albuterol (PROVENTIL HFA, VENTOLIN HFA, PROAIR HFA) 90 mcg/actuation inhaler Take 2 Puffs by inhalation every four (4) hours as needed for Wheezing or Shortness of Breath. Indications: BRONCHOSPASM PREVENTION, Chronic Obstructive Pulmonary Disease 1 Inhaler 0    atorvastatin (LIPITOR) 20 mg tablet Take  by mouth daily.  cholecalciferol, VITAMIN D3, (VITAMIN D3) 5,000 unit tab tablet Take 5,000 Units by mouth daily.  aspirin (ASPIRIN) 325 mg tablet Take 325 mg by mouth daily.  esomeprazole (NEXIUM) 20 mg capsule Take 20 mg by mouth daily. No Known Allergies  Social History     Social History    Marital status:      Spouse name: N/A    Number of children: N/A    Years of education: N/A     Occupational History    Not on file.      Social History Main Topics    Smoking status: Former Smoker     Packs/day: 2.00     Years: 39.00     Quit date: 8/10/2012    Smokeless tobacco: Never Used    Alcohol use No    Drug use: No    Sexual activity: No     Other Topics Concern    Not on file     Social History Narrative      Family History   Problem Relation Age of Onset    Stroke Mother     Heart Disease Father        Physical Exam:    Visit Vitals    /64 (BP 1 Location: Left arm, BP Patient Position: Sitting)    Ht 6' (1.829 m)    Wt 190 lb (86.2 kg)    BMI 25.77 kg/m2      General: Well-appearing male in no acute distress  HEENT: EOMI no scleral icterus is noted moist movement mucous membranes are noted  Pulmonary: No increased work of breathing is noted clear to auscultation bilaterally no wheezes rales rhonchi  Cardiovascular: Regular rate rhythm normal S1-S2 rubs murmurs or gallops  Extremities: Warm and well-perfused bilaterally  Neuro: Cranial nerves II through XII grossly intact    Impression and Plan:  Daniel Junior. is a 57 y.o. male with claudication of the right lower losartan (COZAAR) 50 mg tablet take 1 tablet by mouth once daily 7/26/18  Yes Gilbert Phoenix MD  
Lancets [facility] ULTRASOFT LANCETS) misc USE TO TEST BLOOD SUGAR THREE TIMES A DAY DX E11.9 4/9/18  Yes Gilbert Phoenix MD  
glucose blood VI test strips (ONETOUCH ULTRA TEST) strip USE TO TEST BLOOD SUGAR THREE TIMES A DAY DX E11.9 12/5/17  Yes Gilbert Phoenix MD  
Calcium Carbonate-Vit D3-Min (CALTRATE 600+D PLUS MINERALS) 600 mg (1,500 mg)-400 unit Chew Take  by mouth daily. 6/27/12  Yes Arvil Beverage., MD  
multivitamins-minerals-lutein (CENTRUM SILVER) Tab Take  by mouth. Yes Provider, Historical  
cholecalciferol, vitamin d3, (VITAMIN D) 1,000 unit tablet Take  by mouth daily. Yes Provider, Historical  
 
 
 
Allergies Allergen Reactions  Lisinopril Cough  Simvastatin Myalgia REVIEW OF SYSTEMS: 
Per HPI PHYSICAL EXAM: 
Visit Vitals /66 Pulse 91 Temp 98.1 °F (36.7 °C) (Oral) Resp 16 Ht 5' 6\" (1.676 m) Wt 143 lb 12.8 oz (65.2 kg) LMP  (LMP Unknown) SpO2 95% BMI 23.21 kg/m² Constitutional: Appears well-developed and well-nourished. No distress. HENT:  
Head: Normocephalic and atraumatic. Eyes: No scleral icterus. PERRL, conjunctiva erythematous with clear discharge Ears: tm's wnl Nose: nares patent, mucosa erythematous Mouth: OP clear without lesions, no pharyngeal exudate Neck: no lad, no tm, supple Cardiovascular: Normal S1/S2, regular rhythm. No murmurs, rubs, or gallops. Pulmonary/Chest: Effort normal and breath sounds normal. No respiratory distress. No wheezes, rhonchi, or rales. Neurological: Alert. Psychiatric: Normal mood and affect. Behavior is normal. 
  
Lab Results Component Value Date/Time  Sodium 141 02/06/2019 08:36 AM  
 Potassium 4.4 02/06/2019 08:36 AM  
 Chloride 102 02/06/2019 08:36 AM  
 CO2 24 02/06/2019 08:36 AM  
 Anion gap 9 11/01/2010 08:07 AM  
 Glucose 138 (H) 02/06/2019 08:36 AM  
 BUN 15 02/06/2019 08:36 AM  
 Creatinine 0.80 02/06/2019 08:36 AM  
 BUN/Creatinine ratio 19 02/06/2019 08:36 AM  
 GFR est AA 83 02/06/2019 08:36 AM  
 GFR est non-AA 72 02/06/2019 08:36 AM  
 Calcium 9.4 02/06/2019 08:36 AM  
 Bilirubin, total 0.4 02/06/2019 08:36 AM  
 AST (SGOT) 17 02/06/2019 08:36 AM  
 Alk. phosphatase 57 02/06/2019 08:36 AM  
 Protein, total 6.4 02/06/2019 08:36 AM  
 Albumin 4.1 02/06/2019 08:36 AM  
 Globulin 2.4 11/01/2010 08:07 AM  
 A-G Ratio 1.8 02/06/2019 08:36 AM  
 ALT (SGPT) 11 02/06/2019 08:36 AM  
 
Lab Results Component Value Date/Time Hemoglobin A1c 7.6 (H) 02/06/2019 08:36 AM  
 Hemoglobin A1c 7.2 (H) 08/15/2018 08:13 AM  
 Hemoglobin A1c 6.8 (H) 02/12/2018 08:33 AM  
  
Lab Results Component Value Date/Time Cholesterol, total 166 02/06/2019 08:36 AM  
 HDL Cholesterol 80 02/06/2019 08:36 AM  
 LDL, calculated 74 02/06/2019 08:36 AM  
 VLDL, calculated 12 02/06/2019 08:36 AM  
 Triglyceride 61 02/06/2019 08:36 AM  
 CHOL/HDL Ratio 1.5 11/01/2010 08:07 AM  
  
 
 
ASSESSMENT/PLAN Diagnoses and all orders for this visit: 
 
1. Type 2 diabetes mellitus without complication, with long-term current use of insulin ([address] -     AMB POC HEMOGLOBIN A1C 
-     insulin glargine (LANTUS U-100 INSULIN) 100 unit/mL injection; INJECT 14 UNITS EVERY MORNING AND 3 UNITS EVERY EVENING FOR DIABETES CONTROL Improved a1c but higher during day- increase insulin as above 2. Acute conjunctivitis of both eyes, unspecified acute conjunctivitis type 
-     trimethoprim-polymyxin b (POLYTRIM) ophthalmic solution; Administer 1 Drop to both eyes every four (4) hours. 3. Acute non-recurrent maxillary sinusitis 
-     amoxicillin (AMOXIL) 500 mg capsule; Take 2 Caps by mouth two (2) times a day for 10 days. Health Maintenance Due Topic Date Due  Pneumococcal 65+ years (2 of 2 - PPSV23) 09/04/2016 Follow-up and Dispositions extremity now. I reviewed his ultrasound clinic today this shows that he has normal blood flow in his left lower extremity now but he still does have moderate arterial disease of the right lower extremity with known occlusion of the SFA. We had an extensive discussion as to the different options that he can undergo. He has decided on attempting a right remote endarterectomy of the superficial femoral artery with right lower extremity angiogram possible need for popliteal artery stent with possible need for right common femoral artery to right above-knee popliteal artery greater saphenous vein bypass. We did talk about performing bypass out right but he wanted to give another hybrid procedure a chance prior to moving forward with chest bypass. I did let him know that if his hyper procedure does fail then we would be moving forward with that bypass. I reviewed his vein mapping showing that his right greater saphenous vein is appropriate for use. He was given risks and benefits of the procedure including but not limited to bleeding, infection, damage to adjacent structures, MI, stroke, death, need for further surgery, loss of lower extremity. Patient is understanding all the risks and is willing to move forward with the surgery. We reviewed the plan with the patient and the patient understands. We also gave the patient appropriate instructions on their disease process and when to call back. Greater than 50% of this visit was spent with face to face discussion. Follow-up Disposition: Not on [address] [facility]\"", MD    PLEASE NOTE:  This document has been produced using voice recognition software. Unrecognized errors in transcription may be present. · Return in about 3 months (around 8/16/2019) for dm - poc a1c. Reviewed plan of care. Patient has provided input and agrees with goals. The nurse provided the patient and/or family with advanced directive information if needed and encouraged the patient to provide a copy to the office when available.

## 2019-05-16 NOTE — PROGRESS NOTES
[facility] No chief complaint on file. Reviewed record In preparation for visit and have obtained necessary documentation 1. Have you been to the ER, urgent care clinic since your last visit? Hospitalized since your last visit? NO 
2. Have you seen or consulted any other health care providers outside of the [address] [place] [address] since your last visit? Include any pap smears or colon screening. NO Patient has advance directive on file Provider advised of reason for visit and vitals Health Maintenance Due Topic  Shingrix Vaccine Age 50> (1 of 2)  Pneumococcal 65+ years (2 of 2 - PPSV23) Wt Readings from Last 3 Encounters:  
02/13/19 145 lb 6.4 oz (66 kg) 08/20/18 149 lb 9.6 oz (67.9 kg) 02/19/18 145 lb 9.6 oz (66 kg) Temp Readings from Last 3 Encounters:  
02/13/19 97.6 °F (36.4 °C) (Oral) 08/20/18 97.9 °F (36.6 °C) (Oral) 02/19/18 98.1 °F (36.7 °C) (Oral) BP Readings from Last 3 Encounters:  
02/13/19 118/76  
08/20/18 114/69  
02/19/18 117/73 Pulse Readings from Last 3 Encounters:  
02/13/19 83  
08/20/18 72  
02/19/18 84 Learning Assessment: 
:  
 
Learning Assessment 2/24/2016 4/28/2014 PRIMARY LEARNER Patient Patient HIGHEST LEVEL OF EDUCATION - PRIMARY LEARNER  > 4 YEARS OF COLLEGE -  
BARRIERS PRIMARY LEARNER NONE -  
CO-LEARNER CAREGIVER No - PRIMARY LANGUAGE ENGLISH ENGLISH  
LEARNER PREFERENCE PRIMARY READING DEMONSTRATION  
ANSWERED BY Patient patient RELATIONSHIP SELF SELF Depression Screening: 
:  
 
3 most recent PHQ Screens 5/16/2019 Little interest or pleasure in doing things Not at all Feeling down, depressed, irritable, or hopeless Not at all Total Score PHQ 2 0 Fall Risk Assessment: 
:  
 
Fall Risk Assessment, last 12 mths 5/16/2019 Able to walk? Yes Fall in past 12 months? No  
 
 
Abuse Screening: 
:  
 
Abuse Screening Questionnaire 8/20/2018 8/24/2016 2/10/2015 Do you ever feel afraid of your partner? N N N Are you in a relationship with someone who physically or mentally threatens you? N N N Is it safe for you to go home? Bebo Carbajal  
 
 
ADL Screening: 
:  
 
ADL Assessment 8/20/2018 Feeding yourself No Help Needed Getting from bed to chair No Help Needed Getting dressed No Help Needed Bathing or showering No Help Needed Walk across the room (includes cane/walker) No Help Needed Using the telphone No Help Needed Taking your medications No Help Needed Preparing meals No Help Needed Managing money (expenses/bills) No Help Needed Moderately strenuous housework (laundry) No Help Needed Shopping for personal items (toiletries/medicines) No Help Needed Shopping for groceries No Help Needed Driving No Help Needed Climbing a flight of stairs No Help Needed Getting to places beyond walking distances No Help Needed Medication reconciliation up to date and corrected with patient at this time.

## 2019-05-16 NOTE — TELEPHONE ENCOUNTER
Patient calling with a question about a medication that she was prescribed today. Medication is Polymyxin eye drops. Question is. .. Directions state 1 drop in both eyes every 4 hours. .. Does she need to do this during the night also.   Patient number [phone]

## 2019-05-20 ENCOUNTER — TELEPHONE (OUTPATIENT)
Dept: INTERNAL MEDICINE CLINIC | Facility: CLINIC | Age: 74
End: 2019-05-20

## 2019-05-20 NOTE — TELEPHONE ENCOUNTER
Pt  will be leaving for [place] on Wednesday for 4 days, states has been taking amoxicillin but has been experiencing diarrhea and wants to know if she's ok to take imodium while away to prevent the diarrhea   May be reached at [phone]

## 2019-07-24 ENCOUNTER — HOSPITAL ENCOUNTER (OUTPATIENT)
Dept: MAMMOGRAPHY | Age: 74
Discharge: HOME | End: 2019-07-24
Attending: INTERNAL MEDICINE
Payer: MEDICARE

## 2019-07-24 DIAGNOSIS — Z12.39 BREAST SCREENING: ICD-10-CM

## 2019-07-24 PROCEDURE — 77063 BREAST TOMOSYNTHESIS BI: CPT

## 2019-08-07 RX ORDER — LOSARTAN POTASSIUM 50 MG/1
TABLET ORAL
Qty: 90 TAB | Refills: 3 | Status: SHIPPED | OUTPATIENT
Start: 2019-08-07 | End: 2020-07-30

## 2019-09-05 ENCOUNTER — OFFICE VISIT (OUTPATIENT)
Dept: INTERNAL MEDICINE CLINIC | Facility: CLINIC | Age: 74
End: 2019-09-05

## 2019-09-05 VITALS
DIASTOLIC BLOOD PRESSURE: 69 MMHG | WEIGHT: 144.8 LBS | BODY MASS INDEX: 23.27 KG/M2 | HEART RATE: 71 BPM | RESPIRATION RATE: 14 BRPM | SYSTOLIC BLOOD PRESSURE: 114 MMHG | OXYGEN SATURATION: 95 % | HEIGHT: 66 IN | TEMPERATURE: 97.8 F

## 2019-09-05 DIAGNOSIS — E11.9 TYPE 2 DIABETES MELLITUS WITHOUT COMPLICATION, WITH LONG-TERM CURRENT USE OF INSULIN (HCC): ICD-10-CM

## 2019-09-05 DIAGNOSIS — Z79.4 TYPE 2 DIABETES MELLITUS WITHOUT COMPLICATION, WITH LONG-TERM CURRENT USE OF INSULIN (HCC): ICD-10-CM

## 2019-09-05 DIAGNOSIS — Z00.00 MEDICARE ANNUAL WELLNESS VISIT, SUBSEQUENT: Primary | ICD-10-CM

## 2019-09-05 DIAGNOSIS — Z71.89 ADVANCED DIRECTIVES, COUNSELING/DISCUSSION: ICD-10-CM

## 2019-09-05 DIAGNOSIS — E78.2 MIXED HYPERLIPIDEMIA: ICD-10-CM

## 2019-09-05 LAB — HBA1C MFR BLD HPLC: 6.6 %

## 2019-09-05 NOTE — PROGRESS NOTES
6 [facility]    Chief Complaint   Patient presents with    Diabetes     Room 2A//            Reviewed record In preparation for visit and have obtained necessary documentation    1. Have you been to the ER, urgent care clinic since your last visit? Hospitalized since your last visit? NO  2. Have you seen or consulted any other health care providers outside of the [address] [place] [address] since your last visit? Include any pap smears or colon screening. NO    Patient has advance directive on file    Provider advised of reason for visit and vitals    Health Maintenance Due   Topic    MICROALBUMIN Q1     MEDICARE YEARLY EXAM        Wt Readings from Last 3 Encounters:   05/16/19 143 lb 12.8 oz (65.2 kg)   02/13/19 145 lb 6.4 oz (66 kg)   08/20/18 149 lb 9.6 oz (67.9 kg)     Temp Readings from Last 3 Encounters:   05/16/19 98.1 °F (36.7 °C) (Oral)   02/13/19 97.6 °F (36.4 °C) (Oral)   08/20/18 97.9 °F (36.6 °C) (Oral)     BP Readings from Last 3 Encounters:   05/16/19 120/66   02/13/19 118/76   08/20/18 114/69     Pulse Readings from Last 3 Encounters:   05/16/19 91   02/13/19 83   08/20/18 72         Learning Assessment:  :     Learning Assessment 2/24/2016 4/28/2014   PRIMARY LEARNER Patient Patient   HIGHEST LEVEL OF EDUCATION - PRIMARY LEARNER  > 4 YEARS OF COLLEGE -   BARRIERS PRIMARY LEARNER NONE -   [address] CAREGIVER No -   PRIMARY LANGUAGE ENGLISH ENGLISH   LEARNER PREFERENCE PRIMARY READING DEMONSTRATION   ANSWERED BY Patient patient   RELATIONSHIP SELF SELF       Depression Screening:  :     3 most recent PHQ Screens 5/16/2019   Little interest or pleasure in doing things Not at all   Feeling down, depressed, irritable, or hopeless Not at all   Total Score PHQ 2 0       Fall Risk Assessment:  :     Fall Risk Assessment, last 12 mths 5/16/2019   Able to walk? Yes   Fall in past 12 months?  No       Abuse Screening:  :     Abuse Screening Questionnaire 9/5/2019 8/20/2018 8/24/2016 2/10/2015   Do you ever feel afraid of your partner? N N N N   Are you in a relationship with someone who physically or mentally threatens you? N N N N   Is it safe for you to go home? Y Y Y Y       ADL Screening:  :     ADL Assessment 8/20/2018   Feeding yourself No Help Needed   Getting from bed to chair No Help Needed   Getting dressed No Help Needed   Bathing or showering No Help Needed   Walk across the room (includes cane/walker) No Help Needed   Using the telphone No Help Needed   Taking your medications No Help Needed   Preparing meals No Help Needed   Managing money (expenses/bills) No Help Needed   Moderately strenuous housework (laundry) No Help Needed   Shopping for personal items (toiletries/medicines) No Help Needed   Shopping for groceries No Help Needed   Driving No Help Needed   Climbing a flight of stairs No Help Needed   Getting to places beyond walking distances No Help Needed           Medication reconciliation up to date and corrected with patient at this time.

## 2019-09-05 NOTE — PROGRESS NOTES
HPI  Ms. Demi Huffman is a 66y.o. year old female, she is seen today for follow up DM. Plan last visit:  -     insulin glargine (LANTUS U-100 INSULIN) 100 unit/mL injection; INJECT 14 UNITS EVERY MORNING AND 3 UNITS EVERY EVENING FOR DIABETES CONTROL  Improved a1c but higher during day- increase insulin as above    a1c was 7.5 but now 6.6    No episodes of hypoglycemia. Normally glucose 120-180 hs, in morning as low as 78. Occasionally 140 which is unusual. Doesn't feel glucose drops during the day. Has been working on adding extra day of exercise, eating less carbs. Spends time in pool for exercise. Has been well. No chest pain, sob, dizziness, weakness. Chief Complaint   Patient presents with    Diabetes     Room 2A// NON fasting     Annual Wellness Visit        Prior to Admission medications    Medication Sig Start Date End Date Taking? Authorizing Provider   Insulin Syringe-Needle U-100 (BD INSULIN SYRINGE ULTRA-FINE) 0.3 mL 31 gauge x 5/16\" syrg USE AS DIRECTED TWICE A DAY.  Dx: E11.9 8/12/19  Yes Dimple Stanton MD   losartan (COZAAR) 50 mg tablet take 1 tablet by mouth once daily 8/7/19  Yes Susannah Arevalo MD   spironolactone (ALDACTONE) 50 mg tablet TAKE 1 TABLET BY MOUTH ONCE DAILY 7/8/19  Yes Nigel Koch MD   insulin glargine (LANTUS U-100 INSULIN) 100 unit/mL injection INJECT 14 UNITS EVERY MORNING AND 3 UNITS EVERY EVENING FOR DIABETES CONTROL 5/16/19  Yes Susannah Arevalo MD   metFORMIN (GLUCOPHAGE) 500 mg tablet take 2 tablets by mouth every morning AND 1TABLET AT NOON AND DINNERTIME 8/20/18  Yes Susannah Arevalo MD   Lancets [facility] ULTRASOFT LANCETS) misc USE TO TEST BLOOD SUGAR THREE TIMES A DAY DX E11.9 4/9/18  Yes Susannah Arevalo MD   glucose blood VI test strips (ONETOUCH ULTRA TEST) strip USE TO TEST BLOOD SUGAR THREE TIMES A DAY DX E11.9 12/5/17  Yes Susannah Arevalo MD   Calcium Carbonate-Vit D3-Min (CALTRATE 600+D PLUS MINERALS) 600 mg (1,500 mg)-400 unit Chew Take  by mouth daily. 6/27/12  Yes Angel Bajwa MD   multivitamins-minerals-lutein (CENTRUM SILVER) Tab Take  by mouth. Yes Provider, Historical   cholecalciferol, vitamin d3, (VITAMIN D) 1,000 unit tablet Take  by mouth daily. Yes Provider, Historical         Allergies   Allergen Reactions    Lisinopril Cough    Simvastatin Myalgia         REVIEW OF SYSTEMS:  Per HPI    PHYSICAL EXAM:  Visit Vitals  /69 (BP 1 Location: Left arm, BP Patient Position: Sitting)   Pulse 71   Temp 97.8 °F (36.6 °C) (Oral)   Resp 14   Ht 5' 6\" (1.676 m)   Wt 144 lb 12.8 oz (65.7 kg)   LMP  (LMP Unknown)   SpO2 95%   BMI 23.37 kg/m²     Constitutional: Appears well-developed and well-nourished. No distress. HENT:   Head: Normocephalic and atraumatic. Eyes: No scleral icterus. Cardiovascular: Normal S1/S2, regular rhythm. No murmurs, rubs, or gallops. Pulmonary/Chest: Effort normal and breath sounds normal. No respiratory distress. No wheezes, rhonchi, or rales. Ext: No edema. Neurological: Alert. Psychiatric: Normal mood and affect. Behavior is normal.    DM foot exam: 2+ pedal pulses, no lesions, sensation intact to monofilament b/l      Lab Results   Component Value Date/Time    Sodium 141 02/06/2019 08:36 AM    Potassium 4.4 02/06/2019 08:36 AM    Chloride 102 02/06/2019 08:36 AM    CO2 24 02/06/2019 08:36 AM    Anion gap 9 11/01/2010 08:07 AM    Glucose 138 (H) 02/06/2019 08:36 AM    BUN 15 02/06/2019 08:36 AM    Creatinine 0.80 02/06/2019 08:36 AM    BUN/Creatinine ratio 19 02/06/2019 08:36 AM    GFR est AA 83 02/06/2019 08:36 AM    GFR est non-AA 72 02/06/2019 08:36 AM    Calcium 9.4 02/06/2019 08:36 AM    Bilirubin, total 0.4 02/06/2019 08:36 AM    AST (SGOT) 17 02/06/2019 08:36 AM    Alk.  phosphatase 57 02/06/2019 08:36 AM    Protein, total 6.4 02/06/2019 08:36 AM    Albumin 4.1 02/06/2019 08:36 AM    Globulin 2.4 11/01/2010 08:07 AM    A-G Ratio 1.8 02/06/2019 08:36 AM    ALT (SGPT) 11 02/06/2019 08:36 AM     Lab Results   Component Value Date/Time    Hemoglobin A1c 7.6 (H) 02/06/2019 08:36 AM    Hemoglobin A1c 7.2 (H) 08/15/2018 08:13 AM    Hemoglobin A1c 6.8 (H) 02/12/2018 08:33 AM      Lab Results   Component Value Date/Time    Cholesterol, total 166 02/06/2019 08:36 AM    HDL Cholesterol 80 02/06/2019 08:36 AM    LDL, calculated 74 02/06/2019 08:36 AM    VLDL, calculated 12 02/06/2019 08:36 AM    Triglyceride 61 02/06/2019 08:36 AM    CHOL/HDL Ratio 1.5 11/01/2010 08:07 AM          ASSESSMENT/PLAN  Diagnoses and all orders for this visit:    1. Medicare annual wellness visit, subsequent    2. Type 2 diabetes mellitus without complication, with long-term current use of insulin (HCC)  -     AMB POC HEMOGLOBIN A1C  -     MICROALBUMIN, UR, RAND W/ MICROALB/CREAT RATIO  -     HEMOGLOBIN A1C WITH EAG; Future  -      DIABETES FOOT EXAM  Excellent control - continue current medications   3. Advanced directives, counseling/discussion    4. Mixed hyperlipidemia  -     METABOLIC PANEL, COMPREHENSIVE; Future  -     LIPID PANEL; Future          Health Maintenance Due   Topic Date Due    MICROALBUMIN Q1  08/20/2019    MEDICARE YEARLY EXAM  08/21/2019        Follow-up and Dispositions    · Return in about 6 months (around 3/5/2020) for bp, chol, dm, labs prior. Reviewed plan of care. Patient has provided input and agrees with goals. The nurse provided the patient and/or family with advanced directive information if needed and encouraged the patient to provide a copy to the office when available. This is the Subsequent Medicare Annual Wellness Exam, performed 12 months or more after the Initial AWV or the last Subsequent AWV    I have reviewed the patient's medical history in detail and updated the computerized patient record.      History     Past Medical History:   Diagnosis Date    Cancer ([facility] [address]     skin     Depression     mild with occasional use of prozac  Diabetes ([address]     Nausea & vomiting     Psychiatric disorder     depression      Past Surgical History:   Procedure Laterality Date    BREAST SURGERY PROCEDURE UNLISTED      left breast cystectomy    COLONOSCOPY N/A 11/16/2016    COL[facility]  11/16/2016         HX BREAST BIOPSY Left     benign cyst removed    HX HYSTERECTOMY      HX ORTHOPAEDIC      right hip arthroscopy - Dr. Jennifer Carmona HX OTHER SURGICAL      right leg varicous vein removed    HX EDMUNDO AND BSO  1992     paps  wnl fibroids    HX TONSILLECTOMY      MT COLSC FLX W/REMOVAL LESION BY HOT BX FORCEPS  8/22/2011          Current Outpatient Medications   Medication Sig Dispense Refill    Insulin Syringe-Needle U-100 (BD INSULIN SYRINGE ULTRA-FINE) 0.3 mL 31 gauge x 5/16\" syrg USE AS DIRECTED TWICE A DAY. Dx: E11.9 100 Syringe 1    losartan (COZAAR) 50 mg tablet take 1 tablet by mouth once daily 90 Tab 3    spironolactone (ALDACTONE) 50 mg tablet TAKE 1 TABLET BY MOUTH ONCE DAILY 90 Tab 1    insulin glargine (LANTUS U-100 INSULIN) 100 unit/mL injection INJECT 14 UNITS EVERY MORNING AND 3 UNITS EVERY EVENING FOR DIABETES CONTROL 10 mL 10    metFORMIN (GLUCOPHAGE) 500 mg tablet take 2 tablets by mouth every morning AND 1TABLET AT NOON AND DINNERTIME 360 Tab 4    Lancets (ONETOUCH ULTRASOFT LANCETS) misc USE TO TEST BLOOD SUGAR THREE TIMES A DAY DX E11.9 360 Each 3    glucose blood VI test strips (ONETOUCH ULTRA TEST) strip USE TO TEST BLOOD SUGAR THREE TIMES A DAY DX E11.9 100 Strip 11    Calcium Carbonate-Vit D3-Min (CALTRATE 600+D PLUS MINERALS) 600 mg (1,500 mg)-400 unit Chew Take  by mouth daily.  multivitamins-minerals-lutein (CENTRUM SILVER) Tab Take  by mouth.  cholecalciferol, vitamin d3, (VITAMIN D) 1,000 unit tablet Take  by mouth daily.        Allergies   Allergen Reactions    Lisinopril Cough    Simvastatin Myalgia     Family History Problem Relation Age of Onset    Hypertension Brother      Social History     Tobacco Use    Smoking status: Never Smoker    Smokeless tobacco: Never Used   Substance Use Topics    Alcohol use: Yes     Alcohol/week: 0.0 standard drinks     Comment: occassionally     Patient Active Problem List   Diagnosis Code    Diabetes mellitus ([facility] [address] E11.9    Colon polyps K63.5    Compression fracture of thoracic vertebra (HCC) S22.000A    Right hip pain M25.551    Hyperlipidemia E78.5    Tinea unguium B35.1    Skin cancer C44.90    Hip pain, left M25.552    Osteoporosis M81.0    Dense breast R92.2       Depression Risk Factor Screening:     3 most recent PHQ Screens 5/16/2019   Little interest or pleasure in doing things Not at all   Feeling down, depressed, irritable, or hopeless Not at all   Total Score PHQ 2 0     Alcohol Risk Factor Screening: You do not drink alcohol or very rarely. Functional Ability and Level of Safety:   Hearing Loss  Hearing is good. Activities of Daily Living  The home contains: handrails and grab bars  Patient does total self care    Fall Risk  Fall Risk Assessment, last 12 mths 5/16/2019   Able to walk? Yes   Fall in past 12 months? No       Abuse Screen  Patient is not abused    Cognitive Screening   Evaluation of Cognitive Function:  Has your family/caregiver stated any concerns about your memory: no  Normal    Patient Care Team   Patient Care Team:  Beverly Stewart MD as PCP - General (Internal Medicine)    Assessment/Plan   Education and counseling provided:  Are appropriate based on today's review and evaluation    Diagnoses and all orders for this visit:    1. Medicare annual wellness visit, subsequent    2. Type 2 diabetes mellitus without complication, with long-term current use of insulin ([facility])  -     AMB POC HEMOGLOBIN A1C  -     MICROALBUMIN, UR, RAND W/ MICROALB/CREAT RATIO  -     HEMOGLOBIN A1C WITH EAG; Future  -      DIABETES FOOT EXAM    3.  Advanced directives, counseling/discussion    4. Mixed hyperlipidemia  -     METABOLIC PANEL, COMPREHENSIVE; Future  -     LIPID PANEL;  Future        Health Maintenance Due   Topic Date Due    MICROALBUMIN Q1  08/20/2019    MEDICARE YEARLY EXAM  08/21/2019

## 2019-09-05 NOTE — PATIENT INSTRUCTIONS
Medicare Wellness Visit, Female     The best way to live healthy is to have a lifestyle where you eat a well-balanced diet, exercise regularly, limit alcohol use, and quit all forms of tobacco/nicotine, if applicable. Regular preventive services are another way to keep healthy. Preventive services (vaccines, screening tests, monitoring & exams) can help personalize your care plan, which helps you manage your own care. Screening tests can find health problems at the earliest stages, when they are easiest to treat. rSinath Flores follows the current, evidence-based guidelines published by the [facility] Rashid Trisha ([facility]) when recommending preventive services for our patients. Because we follow these guidelines, sometimes recommendations change over time as research supports it. (For example, mammograms used to be recommended annually. Even though Medicare will still pay for an annual mammogram, the newer guidelines recommend a mammogram every two years for women of average risk.)  Of course, you and your doctor may decide to screen more often for some diseases, based on your risk and your health status. Preventive services for you include:  - Medicare offers their members a free annual wellness visit, which is time for you and your primary care provider to discuss and plan for your preventive service needs. Take advantage of this benefit every year!  -All adults over the age of 72 should receive the recommended pneumonia vaccines. Current USPSTF guidelines recommend a series of two vaccines for the best pneumonia protection.   -All adults should have a flu vaccine yearly and a tetanus vaccine every 10 years. All adults age 61 and older should receive a shingles vaccine once in their lifetime.    -A bone mass density test is recommended when a woman turns 65 to screen for osteoporosis. This test is only recommended one time, as a screening.  Some providers will use this same test as a disease monitoring tool if you already have osteoporosis. -All adults age 38-68 who are overweight should have a diabetes screening test once every three years.   -Other screening tests and preventive services for persons with diabetes include: an eye exam to screen for diabetic retinopathy, a kidney function test, a foot exam, and stricter control over your cholesterol.   -Cardiovascular screening for adults with routine risk involves an electrocardiogram (ECG) at intervals determined by your doctor.   -Colorectal cancer screenings should be done for adults age 54-65 with no increased risk factors for colorectal cancer. There are a number of acceptable methods of screening for this type of cancer. Each test has its own benefits and drawbacks. Discuss with your doctor what is most appropriate for you during your annual wellness visit. The different tests include: colonoscopy (considered the best screening method), a fecal occult blood test, a fecal DNA test, and sigmoidoscopy. -Breast cancer screenings are recommended every other year for women of normal risk, age 54-69.  -Cervical cancer screenings for women over age 72 are only recommended with certain risk factors.   -All adults born between [facility] should be screened once for Hepatitis C.      Here is a list of your current Health Maintenance items (your personalized list of preventive services) with a due date:  Health Maintenance Due   Topic Date Due    Albumin Urine Test  08/20/2019    Annual Well Visit  08/21/2019

## 2019-09-06 LAB
ALBUMIN/CREAT UR: <3 MG/G CREAT (ref 0–30)
CREAT UR-MCNC: 100.3 MG/DL
MICROALBUMIN UR-MCNC: <3 UG/ML

## 2019-09-09 DIAGNOSIS — E11.9 TYPE 2 DIABETES MELLITUS WITHOUT COMPLICATION, WITH LONG-TERM CURRENT USE OF INSULIN (HCC): ICD-10-CM

## 2019-09-09 DIAGNOSIS — Z79.4 TYPE 2 DIABETES MELLITUS WITHOUT COMPLICATION, WITH LONG-TERM CURRENT USE OF INSULIN (HCC): ICD-10-CM

## 2019-09-10 RX ORDER — METFORMIN HYDROCHLORIDE 500 MG/1
TABLET ORAL
Qty: 360 TAB | Refills: 3 | Status: SHIPPED | OUTPATIENT
Start: 2019-09-10 | End: 2020-08-28

## 2019-09-10 RX ORDER — SPIRONOLACTONE 50 MG/1
TABLET, FILM COATED ORAL
Qty: 30 TAB | Refills: 11 | Status: SHIPPED | OUTPATIENT
Start: 2019-09-10 | End: 2020-03-11 | Stop reason: SDUPTHER

## 2019-09-24 RX ORDER — LANCETS
EACH MISCELLANEOUS
Qty: 360 EACH | Refills: 3 | Status: SHIPPED | OUTPATIENT
Start: 2019-09-24 | End: 2020-03-17

## 2019-11-13 DIAGNOSIS — E11.9 TYPE 2 DIABETES MELLITUS WITHOUT COMPLICATION, WITH LONG-TERM CURRENT USE OF INSULIN (HCC): ICD-10-CM

## 2019-11-13 DIAGNOSIS — Z79.4 TYPE 2 DIABETES MELLITUS WITHOUT COMPLICATION, WITH LONG-TERM CURRENT USE OF INSULIN (HCC): ICD-10-CM

## 2019-11-14 RX ORDER — CALCIUM CARB/VITAMIN D3/VIT K1 500-100-40
TABLET,CHEWABLE ORAL
Qty: 100 SYRINGE | Refills: 5 | Status: SHIPPED | OUTPATIENT
Start: 2019-11-14 | End: 2020-09-21 | Stop reason: SDUPTHER

## 2020-02-27 LAB
ALBUMIN SERPL-MCNC: 4.2 G/DL (ref 3.7–4.7)
ALBUMIN/GLOB SERPL: 2 {RATIO} (ref 1.2–2.2)
ALP SERPL-CCNC: 51 IU/L (ref 39–117)
ALT SERPL-CCNC: 21 IU/L (ref 0–32)
AST SERPL-CCNC: 27 IU/L (ref 0–40)
BILIRUB SERPL-MCNC: 0.4 MG/DL (ref 0–1.2)
BUN SERPL-MCNC: 18 MG/DL (ref 8–27)
BUN/CREAT SERPL: 23 (ref 12–28)
CALCIUM SERPL-MCNC: 9.4 MG/DL (ref 8.7–10.3)
CHLORIDE SERPL-SCNC: 103 MMOL/L (ref 96–106)
CHOLEST SERPL-MCNC: 172 MG/DL (ref 100–199)
CO2 SERPL-SCNC: 24 MMOL/L (ref 20–29)
CREAT SERPL-MCNC: 0.77 MG/DL (ref 0.57–1)
EST. AVERAGE GLUCOSE BLD GHB EST-MCNC: 160 MG/DL
GLOBULIN SER CALC-MCNC: 2.1 G/DL (ref 1.5–4.5)
GLUCOSE SERPL-MCNC: 121 MG/DL (ref 65–99)
HBA1C MFR BLD: 7.2 % (ref 4.8–5.6)
HDLC SERPL-MCNC: 72 MG/DL
LDLC SERPL CALC-MCNC: 84 MG/DL (ref 0–99)
POTASSIUM SERPL-SCNC: 4.2 MMOL/L (ref 3.5–5.2)
PROT SERPL-MCNC: 6.3 G/DL (ref 6–8.5)
SODIUM SERPL-SCNC: 140 MMOL/L (ref 134–144)
TRIGL SERPL-MCNC: 81 MG/DL (ref 0–149)
VLDLC SERPL CALC-MCNC: 16 MG/DL (ref 5–40)

## 2020-03-11 ENCOUNTER — OFFICE VISIT (OUTPATIENT)
Dept: INTERNAL MEDICINE CLINIC | Facility: CLINIC | Age: 75
End: 2020-03-11

## 2020-03-11 VITALS
TEMPERATURE: 97.8 F | DIASTOLIC BLOOD PRESSURE: 60 MMHG | SYSTOLIC BLOOD PRESSURE: 128 MMHG | RESPIRATION RATE: 14 BRPM | HEIGHT: 66 IN | WEIGHT: 149.2 LBS | BODY MASS INDEX: 23.98 KG/M2 | HEART RATE: 77 BPM | OXYGEN SATURATION: 100 %

## 2020-03-11 DIAGNOSIS — L98.9 FOOT LESION: ICD-10-CM

## 2020-03-11 DIAGNOSIS — E78.2 MIXED HYPERLIPIDEMIA: ICD-10-CM

## 2020-03-11 DIAGNOSIS — Z79.4 TYPE 2 DIABETES MELLITUS WITHOUT COMPLICATION, WITH LONG-TERM CURRENT USE OF INSULIN (HCC): Primary | ICD-10-CM

## 2020-03-11 DIAGNOSIS — L65.9 ALOPECIA: ICD-10-CM

## 2020-03-11 DIAGNOSIS — E11.9 TYPE 2 DIABETES MELLITUS WITHOUT COMPLICATION, WITH LONG-TERM CURRENT USE OF INSULIN (HCC): Primary | ICD-10-CM

## 2020-03-11 RX ORDER — SPIRONOLACTONE 25 MG/1
25 TABLET ORAL DAILY
Qty: 90 TAB | Refills: 3 | Status: SHIPPED | OUTPATIENT
Start: 2020-03-11 | End: 2021-03-04 | Stop reason: SDUPTHER

## 2020-03-11 NOTE — PROGRESS NOTES
HPI  Ms. Fernanda Kevin is a 66y.o. year old female, she is seen today for follow up HTN, high cholesterol, DM. Has a sore spot bottom of right foot present for a while and now feeling more sore. A1C 7.2 today - was 6.6 last check. Was eating more sweets. Hasn't been exercising as consistently lately - goes to pool for 45min-1 hour 2x per week. 3rd day normally walks around track. No chest pain, sob, dizziness, weakness. Feeling like energy is lower. Chief Complaint   Patient presents with    Blood Pressure Check     Room 2A//     Cholesterol Problem    Diabetes        Prior to Admission medications    Medication Sig Start Date End Date Taking? Authorizing Provider   spironolactone (ALDACTONE) 25 mg tablet Take 1 Tab by mouth daily. 3/11/20  Yes Tad Navarrete MD   [facility] ULTRA BLUE TEST STRIP strip USE TO TEST BLOOD SUGAR THREE TIMES A DAY 3/5/20  Yes Tad Navarrete MD   Insulin Syringe-Needle U-100 (BD INSULIN SYRINGE ULTRA-FINE) 0.3 mL 31 gauge x 5/16\" syrg USE AS DIRECTED TWICE DAILY 11/14/19  Yes Tad Navarrete MD   lancets [facility] ULTRASOFT LANCETS) misc USE TO TEST BLOOD SUGAR THREE TIMES A DAY DX E11.9 9/24/19  Yes Tad Navarrete MD   metFORMIN (GLUCOPHAGE) 500 mg tablet TAKE 2 TABLETS BY MOUTH EVERY MORNING AND TAKE 1 TABLET BY MOUTH AT NOON AND DINNERTIME 9/10/19  Yes Tad Navarrete MD   losartan (COZAAR) 50 mg tablet take 1 tablet by mouth once daily 8/7/19  Yes Tad Navarrete MD   insulin glargine (LANTUS U-100 INSULIN) 100 unit/mL injection INJECT 14 UNITS EVERY MORNING AND 3 UNITS EVERY EVENING FOR DIABETES CONTROL 5/16/19  Yes Tad Navarrete MD   Calcium Carbonate-Vit D3-Min (CALTRATE 600+D PLUS MINERALS) 600 mg (1,500 mg)-400 unit Chew Take  by mouth daily. 6/27/12  Yes Cait Medellin MD   multivitamins-minerals-lutein (CENTRUM SILVER) Tab Take  by mouth.    Yes Provider, Historical   cholecalciferol, vitamin d3, (VITAMIN D) 1,000 unit tablet Take  by mouth daily. Yes Provider, Historical         Allergies   Allergen Reactions    Lisinopril Cough    Simvastatin Myalgia         REVIEW OF SYSTEMS:  Per HPI    PHYSICAL EXAM:  Visit Vitals  /60   Pulse 77   Temp 97.8 °F (36.6 °C) (Oral)   Resp 14   Ht 5' 6\" (1.676 m)   Wt 149 lb 3.2 oz (67.7 kg)   LMP  (LMP Unknown)   SpO2 100%   BMI 24.08 kg/m²     Constitutional: Appears well-developed and well-nourished. No distress. HENT:   Head: Normocephalic and atraumatic. Eyes: No scleral icterus. Cardiovascular: Normal S1/S2, regular rhythm. No murmurs, rubs, or gallops. Pulmonary/Chest: Effort normal and breath sounds normal. No respiratory distress. No wheezes, rhonchi, or rales. Ext: No edema. Neurological: Alert. Psychiatric: Normal mood and affect. Behavior is normal.    DM foot exam: 2+ pedal pulses, no lesions left foot, +tender callus like lesion base of right 5th metatarsal, sensation intact to monofilament b/l, decreased sensation vibratory right foot    Lab Results   Component Value Date/Time    Sodium 140 02/26/2020 07:49 AM    Potassium 4.2 02/26/2020 07:49 AM    Chloride 103 02/26/2020 07:49 AM    CO2 24 02/26/2020 07:49 AM    Anion gap 9 11/01/2010 08:07 AM    Glucose 121 (H) 02/26/2020 07:49 AM    BUN 18 02/26/2020 07:49 AM    Creatinine 0.77 02/26/2020 07:49 AM    BUN/Creatinine ratio 23 02/26/2020 07:49 AM    GFR est AA 87 02/26/2020 07:49 AM    GFR est non-AA 75 02/26/2020 07:49 AM    Calcium 9.4 02/26/2020 07:49 AM    Bilirubin, total 0.4 02/26/2020 07:49 AM    AST (SGOT) 27 02/26/2020 07:49 AM    Alk.  phosphatase 51 02/26/2020 07:49 AM    Protein, total 6.3 02/26/2020 07:49 AM    Albumin 4.2 02/26/2020 07:49 AM    Globulin 2.4 11/01/2010 08:07 AM    A-G Ratio 2.0 02/26/2020 07:49 AM    ALT (SGPT) 21 02/26/2020 07:49 AM     Lab Results   Component Value Date/Time    Hemoglobin A1c 7.2 (H) 02/26/2020 07:49 AM    Hemoglobin A1c 7.6 (H) 02/06/2019 08:36 AM Hemoglobin A1c 7.2 (H) 08/15/2018 08:13 AM      Lab Results   Component Value Date/Time    Cholesterol, total 172 02/26/2020 07:49 AM    HDL Cholesterol 72 02/26/2020 07:49 AM    LDL, calculated 84 02/26/2020 07:49 AM    VLDL, calculated 16 02/26/2020 07:49 AM    Triglyceride 81 02/26/2020 07:49 AM    CHOL/HDL Ratio 1.5 11/01/2010 08:07 AM          ASSESSMENT/PLAN  Diagnoses and all orders for this visit:    1. Type 2 diabetes mellitus without complication, with long-term current use of insulin (HCC)  -     METABOLIC PANEL, COMPREHENSIVE; Future  -     LIPID PANEL; Future  -     HEMOGLOBIN A1C WITH EAG; Future  Controlled on current regimen, continue   2. Mixed hyperlipidemia  At goal - continue current medications   3. Foot lesion  -     REFERRAL TO PODIATRY  ? plantar wart  4. Alopecia  -     spironolactone (ALDACTONE) 25 mg tablet; Take 1 Tab by mouth daily. bp on low side with some fatigue, on spironolactone for alopecia and says it hasn't helped, decrease spironolactone as above        There are no preventive care reminders to display for this patient. Follow-up and Dispositions    · Return in about 6 months (around 9/11/2020) for bp, chol, dm, AWV, labs prior. Reviewed plan of care. Patient has provided input and agrees with goals. The nurse provided the patient and/or family with advanced directive information if needed and encouraged the patient to provide a copy to the office when available.

## 2020-03-17 RX ORDER — LANCETS
EACH MISCELLANEOUS
Qty: 300 EACH | Refills: 5 | Status: SHIPPED | OUTPATIENT
Start: 2020-03-17 | End: 2021-03-17 | Stop reason: SDUPTHER

## 2020-03-29 DIAGNOSIS — Z79.4 TYPE 2 DIABETES MELLITUS WITHOUT COMPLICATION, WITH LONG-TERM CURRENT USE OF INSULIN (HCC): ICD-10-CM

## 2020-03-29 DIAGNOSIS — E11.9 TYPE 2 DIABETES MELLITUS WITHOUT COMPLICATION, WITH LONG-TERM CURRENT USE OF INSULIN (HCC): ICD-10-CM

## 2020-03-30 RX ORDER — INSULIN GLARGINE 100 [IU]/ML
INJECTION, SOLUTION SUBCUTANEOUS
Qty: 10 ML | Refills: 10 | Status: SHIPPED | OUTPATIENT
Start: 2020-03-30 | End: 2021-02-08 | Stop reason: SDUPTHER

## 2020-07-02 ENCOUNTER — OFFICE VISIT (OUTPATIENT)
Dept: INTERNAL MEDICINE CLINIC | Facility: CLINIC | Age: 75
End: 2020-07-02

## 2020-07-02 VITALS
HEART RATE: 79 BPM | RESPIRATION RATE: 16 BRPM | DIASTOLIC BLOOD PRESSURE: 68 MMHG | BODY MASS INDEX: 24.08 KG/M2 | TEMPERATURE: 98.2 F | SYSTOLIC BLOOD PRESSURE: 120 MMHG | HEIGHT: 66 IN | WEIGHT: 149.8 LBS | OXYGEN SATURATION: 95 %

## 2020-07-02 DIAGNOSIS — R94.31 ABNORMAL EKG: ICD-10-CM

## 2020-07-02 DIAGNOSIS — H25.9 AGE-RELATED CATARACT OF BOTH EYES, UNSPECIFIED AGE-RELATED CATARACT TYPE: Primary | ICD-10-CM

## 2020-07-02 DIAGNOSIS — Z01.818 PRE-OP EVALUATION: ICD-10-CM

## 2020-07-02 DIAGNOSIS — I10 ESSENTIAL HYPERTENSION: ICD-10-CM

## 2020-07-02 DIAGNOSIS — Z79.4 TYPE 2 DIABETES MELLITUS WITHOUT COMPLICATION, WITH LONG-TERM CURRENT USE OF INSULIN (HCC): ICD-10-CM

## 2020-07-02 DIAGNOSIS — E11.9 TYPE 2 DIABETES MELLITUS WITHOUT COMPLICATION, WITH LONG-TERM CURRENT USE OF INSULIN (HCC): ICD-10-CM

## 2020-07-02 RX ORDER — ASPIRIN 81 MG/1
81 TABLET ORAL DAILY
Qty: 30 TAB | Refills: 3 | Status: SHIPPED | OUTPATIENT
Start: 2020-07-02 | End: 2022-04-11

## 2020-07-02 NOTE — PATIENT INSTRUCTIONS
Start daily baby aspirin 81mg. Follow-up with Cardiology prior to your Cataract surgery. Cataract Surgery: Before Your Surgery  What is cataract surgery? Cataracts are cloudy areas in the lens of your eye. Your lens is behind the colored part of your eye (iris). Its job is to focus light onto the back of your eye. In some people, cataracts prevent light from reaching the back of the eye. This can cause vision problems. Cataract surgery helps you see better. It replaces your natural lens, which has become cloudy, with a clear artificial one. There are two types of cataract surgery. Phacoemulsification (say \"ftkk-un-cj-hzt-uix-boz-JAYJAY-shun\") is the most common type. The doctor makes a small cut in your eye. This cut is called an incision. The doctor uses a special ultrasound tool to break your cloudy lens apart. Sometimes a laser is used too. Then he or she removes the small pieces of the lens through the incision. In most cases, the doctor then inserts an artificial lens through the incision. Most people do not need stitches, because the incision is so small. If the doctor is not able to put in an artificial lens, you can wear a contact lens or thick glasses in place of your natural lens. Extracapsular extraction is a less common type of cataract surgery. The doctor makes a larger incision to remove the whole lens at once. After the doctor removes the lens, he or she stitches up the incision. Recovery from this type of surgery takes longer. Before either surgery, the doctor puts numbing drops in your eye. Some doctors use a shot instead. You may also get medicine to make you feel relaxed. You probably will not feel much pain. The surgery takes about 20 to 40 minutes. After surgery, you may have a bandage or shield on your eye. You will probably go home from surgery after 1 hour in the recovery room. Most people see better in 1 to 3 days.  You may be able to go back to work or your normal routine in a few days. It could take 3 to 10 weeks for your eye to completely heal. After your eye heals, you may still need to wear glasses, especially for reading. Follow-up care is a key part of your treatment and safety. Be sure to make and go to all appointments, and call your doctor if you are having problems. It's also a good idea to know your test results and keep a list of the medicines you take. How do you prepare for surgery? Surgery can be stressful. This information will help you understand what you can expect. And it will help you safely prepare for surgery. Preparing for surgery  · Be sure you have someone to take you home. Anesthesia and pain medicine will make it unsafe for you to drive or get home on your own. · Understand exactly what surgery is planned, along with the risks, benefits, and other options. · If you take aspirin or some other blood thinner, ask your doctor if you should stop taking it before your surgery. Make sure that you understand exactly what your doctor wants you to do. These medicines increase the risk of bleeding. · Tell your doctor ALL the medicines, vitamins, supplements, and herbal remedies you take. Some may increase the risk of problems during your surgery. Your doctor will tell you if you should stop taking any of them before the surgery and how soon to do it. · Make sure your doctor and the hospital have a copy of your advance directive. If you don't have one, you may want to prepare one. It lets others know your health care wishes. It's a good thing to have before any type of surgery or procedure. What happens on the day of surgery? · Follow the instructions exactly about when to stop eating and drinking. If you don't, your surgery may be canceled. If your doctor told you to take your medicines on the day of surgery, take them with only a sip of water. · Take a bath or shower before you come in for your surgery.  Do not apply lotions, perfumes, deodorants, or nail polish. · Take off all jewelry and piercings. And take out contact lenses, if you wear them. At the hospital or surgery center    · Bring a picture ID. · The area for surgery is often marked to make sure there are no errors. · You will be kept comfortable and safe by your anesthesia provider. The anesthesia may make you sleep. Or it may just numb the area being worked on. · The surgery will take about 20 to 40 minutes. When should you call your doctor? · You have questions or concerns. · You don't understand how to prepare for your surgery. · You become ill before the surgery (such as fever, flu, or a cold). · You need to reschedule or have changed your mind about having the surgery. Where can you learn more? Go to http://www.gray.com/  Enter K474 in the search box to learn more about \"Cataract Surgery: Before Your Surgery. \"  Current as of: December 18, 2019               Content Version: 12.5  © 1777-6086 [employer]. Care instructions adapted under license by [employer] (which disclaims liability or warranty for this information). If you have questions about a medical condition or this instruction, always ask your healthcare professional. [address] any warranty or liability for your use of this information.

## 2020-07-02 NOTE — PROGRESS NOTES
Preoperative Evaluation    Date of Exam: 2020     Visit Vitals  /68   Pulse 79   Temp 98.2 °F (36.8 °C) (Temporal)   Resp 16   Ht 5' 6\" (1.676 m)   Wt 149 lb 12.8 oz (67.9 kg)   LMP  (LMP Unknown)   SpO2 95%   BMI 24.18 kg/m²        Efrain Costa is a 66 y.o. female (:1945) who presents for preoperative evaluation. Procedure/Surgery: Cataract, bilateral   Date of Procedure/Surgery:  and    Surgeon: Dr. Shaina Kim: [facility]  Primary Physician: Elkin Galloway MD    Questions:  -Normal state of health today? Denies   -Do you usually get chest pain or breathlessness when you climb up two flights of stairs at normal speed? Denies. METS >4    Latex Allergy: Denies    Pt has DM2- blood sugars have been 120-130s on average, rarely in the 200s. Lowest has been in the 90s recently.        Patient Active Problem List   Diagnosis Code    Diabetes mellitus ([address] E11.9    Colon polyps K63.5    Compression fracture of thoracic vertebra (HCC) S22.000A    Right hip pain M25.551    Hyperlipidemia E78.5    Tinea unguium B35.1    Skin cancer C44.90    Hip pain, left M25.552    Osteoporosis M81.0    Dense breast R92.2     Past Medical History:   Diagnosis Date    Cancer ([address]     skin     Depression     mild with occasional use of prozac    Diabetes ([address]     Nausea & vomiting     Psychiatric disorder     depression     Past Surgical History:   Procedure Laterality Date    BREAST SURGERY PROCEDURE UNLISTED      left breast cystectomy    COLONOSCOPY N/A 2016    [facility]  2016         HX BREAST BIOPSY Left     benign cyst removed    HX HYSTERECTOMY      HX ORTHOPAEDIC      right hip arthroscopy - Dr. Yunior Martin  [address]      right leg varicous vein removed    HX EDMUNDO AND BSO       paps  wnl fibroids    HX TONSILLECTOMY      [place] COLSC FLX W/REMOVAL LESION BY HOT BX FORCEPS  8/22/2011          Social History     Socioeconomic History    Marital status: SINGLE     Spouse name: Not on file    Number of children: Not on file    Years of education: Not on file    Highest education level: Not on file   Tobacco Use    Smoking status: Never Smoker    Smokeless tobacco: Never Used   Substance and Sexual Activity    Alcohol use: Yes     Alcohol/week: 0.0 standard drinks     Comment: occassionally    Drug use: No    Sexual activity: Not Currently     Partners: Male     Birth control/protection: None     Family History   Problem Relation Age of Onset    Hypertension Brother      Allergies   Allergen Reactions    Lisinopril Cough    Simvastatin Myalgia       Current Outpatient Medications   Medication Sig    aspirin delayed-release 81 mg tablet Take 1 Tab by mouth daily.  insulin glargine (Lantus U-100 Insulin) 100 unit/mL injection INJECT 14 UNITS EVERY MORNING AND 3 UNITS EVERY EVENING FOR DIABETES CONTROL    lancets (OneTouch UltraSoft Lancets) misc USE TO TEST BLOOD SUGAR THREE TIMES A DAY    spironolactone (ALDACTONE) 25 mg tablet Take 1 Tab by mouth daily.  ONETOUCH ULTRA BLUE TEST STRIP strip USE TO TEST BLOOD SUGAR THREE TIMES A DAY    Insulin Syringe-Needle U-100 (BD INSULIN SYRINGE ULTRA-FINE) 0.3 mL 31 gauge x 5/16\" syrg USE AS DIRECTED TWICE DAILY    metFORMIN (GLUCOPHAGE) 500 mg tablet TAKE 2 TABLETS BY MOUTH EVERY MORNING AND TAKE 1 TABLET BY MOUTH AT NOON AND DINNERTIME    losartan (COZAAR) 50 mg tablet take 1 tablet by mouth once daily    Calcium Carbonate-Vit D3-Min (CALTRATE 600+D PLUS MINERALS) 600 mg (1,500 mg)-400 unit Chew Take  by mouth daily.  multivitamins-minerals-lutein (CENTRUM SILVER) Tab Take  by mouth.  cholecalciferol, vitamin d3, (VITAMIN D) 1,000 unit tablet Take  by mouth daily. No current facility-administered medications for this visit. Anesthesia Complications:  Had episode of vomiting in the past, has otherwise tolerated it ok. History of abnormal bleeding : Denies. Hx of heavy menstrual periods prior to surgery. History of Blood Transfusions: Denies  Health Care Directive or Living Will: Yes ADR on file. REVIEW OF SYSTEMS:  Constitutional: Negative for recent fever and chills. Skin: Negative for rash or skin lesions. HENT: Negative review. Eyes: Negative for visual changes. Cardiovascular:  Negative for chest pain, exertional dyspnea and palpitations. Respiratory: Negative for cough and hemoptysis, shortness of breath, orthopnea, PND. Gastrointestinal: Negative for nausea and vomitting. Negative for abdominal pain   diarrhea or constipation. No melena. Genitourinary: Negative for dysuria, blood in the urine, frequency or burning. Lymphoreticular: No lymphadenopathy. Musculoskeletal: Has occasional left leg pain/soreness that is improving. History of edema: Denies   Neurological: Negative for dizziness, seizures or syncopal episodes   Psychiatric: Negative.      Physical Examination:   Vital signs:   Visit Vitals  /78 (BP 1 Location: Left arm, BP Patient Position: Sitting)   Pulse 79   Temp 98.2 °F (36.8 °C) (Temporal)   Resp 16   Ht 5' 6\" (1.676 m)   Wt 149 lb 12.8 oz (67.9 kg)   LMP  (LMP Unknown)   SpO2 95%   BMI 24.18 kg/m²     General appearance - alert, well appearing, and in no distress  Mental status - alert, oriented to person, place, and time, normal mood, behavior, speech, dress, motor activity, and thought processes  Eyes - pupils equal and reactive, extraocular eye movements intact  Ears - bilateral TM's and external ear canals normal  Nose - normal and patent, no erythema, discharge or polyps  Mouth - mucous membranes moist, pharynx normal without lesions  Neck - supple, no significant adenopathy, no thyromegaly  Chest - clear to auscultation, no wheezes, rales or rhonchi, symmetric air entry  Heart - normal rate, regular rhythm, normal S1, S2, no murmurs, rubs, clicks or gallops  Abdomen - soft, nontender, nondistended, no masses or organomegaly, bowel sounds normal and present  Neurological - alert, oriented, normal speech, no focal findings or movement disorder noted, cranial nerves II through XII intact  Musculoskeletal - no joint tenderness, deformity or swelling  Extremities - peripheral pulses normal, no pedal edema, no clubbing or cyanosis  Skin - normal coloration and turgor, no rashes, no suspicious skin lesions noted       DIAGNOSTICS:   1. EKG: EKG FINDINGS - T wave inversion in V1 and V2, sinus rhythm. No complete old EKG to compare to. Reviewed with Dr. Filippo Lennon. No results found for any visits on 07/02/20. ASSESSMENT and PLAN  Diagnoses and all orders for this visit:    1. Age-related cataract of both eyes, unspecified age-related cataract type  Pt approved for surgery pending cardiac clearance. 2. Pre-op evaluation  -     AMB POC EKG ROUTINE W/ 12 LEADS, INTER & REP  -     REFERRAL TO CARDIOLOGY  See above    3. Type 2 diabetes mellitus without complication, with long-term current use of insulin (HCC)  BS controlled    4. Essential hypertension  HTN controlled    5. Abnormal EKG  -     REFERRAL TO CARDIOLOGY  -     aspirin delayed-release 81 mg tablet; Take 1 Tab by mouth daily. Reviewed with Dr. Filippo Lennon, pt asymptomatic, recommend cardiac eval prior to surgery for T wave inversion. Start daily aspirin as above. Patient Instructions   Start daily baby aspirin 81mg. Follow-up with Cardiology prior to your Cataract surgery. Cataract Surgery: Before Your Surgery  What is cataract surgery? Cataracts are cloudy areas in the lens of your eye. Your lens is behind the colored part of your eye (iris). Its job is to focus light onto the back of your eye. In some people, cataracts prevent light from reaching the back of the eye. This can cause vision problems. Cataract surgery helps you see better.  It replaces your natural lens, which has become cloudy, with a clear artificial one. There are two types of cataract surgery. Phacoemulsification (say \"zyri-kb-oo-zhc-mlq-lvc-JAYJAY-shun\") is the most common type. The doctor makes a small cut in your eye. This cut is called an incision. The doctor uses a special ultrasound tool to break your cloudy lens apart. Sometimes a laser is used too. Then he or she removes the small pieces of the lens through the incision. In most cases, the doctor then inserts an artificial lens through the incision. Most people do not need stitches, because the incision is so small. If the doctor is not able to put in an artificial lens, you can wear a contact lens or thick glasses in place of your natural lens. Extracapsular extraction is a less common type of cataract surgery. The doctor makes a larger incision to remove the whole lens at once. After the doctor removes the lens, he or she stitches up the incision. Recovery from this type of surgery takes longer. Before either surgery, the doctor puts numbing drops in your eye. Some doctors use a shot instead. You may also get medicine to make you feel relaxed. You probably will not feel much pain. The surgery takes about 20 to 40 minutes. After surgery, you may have a bandage or shield on your eye. You will probably go home from surgery after 1 hour in the recovery room. Most people see better in 1 to 3 days. You may be able to go back to work or your normal routine in a few days. It could take 3 to 10 weeks for your eye to completely heal. After your eye heals, you may still need to wear glasses, especially for reading. Follow-up care is a key part of your treatment and safety. Be sure to make and go to all appointments, and call your doctor if you are having problems. It's also a good idea to know your test results and keep a list of the medicines you take. How do you prepare for surgery? Surgery can be stressful.  This information will help you understand what you can expect. And it will help you safely prepare for surgery. Preparing for surgery  · Be sure you have someone to take you home. Anesthesia and pain medicine will make it unsafe for you to drive or get home on your own. · Understand exactly what surgery is planned, along with the risks, benefits, and other options. · If you take aspirin or some other blood thinner, ask your doctor if you should stop taking it before your surgery. Make sure that you understand exactly what your doctor wants you to do. These medicines increase the risk of bleeding. · Tell your doctor ALL the medicines, vitamins, supplements, and herbal remedies you take. Some may increase the risk of problems during your surgery. Your doctor will tell you if you should stop taking any of them before the surgery and how soon to do it. · Make sure your doctor and the hospital have a copy of your advance directive. If you don't have one, you may want to prepare one. It lets others know your health care wishes. It's a good thing to have before any type of surgery or procedure. What happens on the day of surgery? · Follow the instructions exactly about when to stop eating and drinking. If you don't, your surgery may be canceled. If your doctor told you to take your medicines on the day of surgery, take them with only a sip of water. · Take a bath or shower before you come in for your surgery. Do not apply lotions, perfumes, deodorants, or nail polish. · Take off all jewelry and piercings. And take out contact lenses, if you wear them. At the hospital or surgery center    · Bring a picture ID. · The area for surgery is often marked to make sure there are no errors. · You will be kept comfortable and safe by your anesthesia provider. The anesthesia may make you sleep. Or it may just numb the area being worked on. · The surgery will take about 20 to 40 minutes. When should you call your doctor?     · You have questions or concerns. · You don't understand how to prepare for your surgery. · You become ill before the surgery (such as fever, flu, or a cold). · You need to reschedule or have changed your mind about having the surgery. Where can you learn more? Go to http://bhargavi-sagar.info/  Enter K474 in the search box to learn more about \"Cataract Surgery: Before Your Surgery. \"  Current as of: December 18, 2019               Content Version: 12.5  © 1814-5750 [employer]. Care instructions adapted under license by [employer] (which disclaims liability or warranty for this information). If you have questions about a medical condition or this instruction, always ask your healthcare professional. [address] any warranty or liability for your use of this information. Please keep your follow-up appointment with Moises Lopez MD.         There are no preventive care reminders to display for this patient. I have discussed the diagnosis with the patient and the intended plan as seen in the above orders. Patient is in agreement. The patient has received an after-visit summary and questions were answered concerning future plans. I have discussed medication side effects and warnings with the patient as well. Warning signs for the above conditions were discussed including when to call our office or go to the emergency room. The nurse provided the patient and/or family with advanced directive information if needed and encouraged the patient to provide a copy to the office when available.

## 2020-07-02 NOTE — PROGRESS NOTES
Miky Del Rio  Identified pt with two pt identifiers(name and ). Chief Complaint   Patient presents with   Evelin Mcqueen - Dr. Juanjo Mariee -  and         Reviewed record In preparation for visit and have obtained necessary documentation. 1. Have you been to the ER, urgent care clinic or hospitalized since your last visit? No     2. Have you seen or consulted any other health care providers outside of the [address] [place] [address] since your last visit? Include any pap smears or colon screening. No. Podiatrist    Patient has an advance directive. Vitals reviewed with provider. Health Maintenance reviewed: There are no preventive care reminders to display for this patient. 015   Wt Readings from Last 3 Encounters:   20 149 lb 12.8 oz (67.9 kg)   20 149 lb 3.2 oz (67.7 kg)   19 144 lb 12.8 oz (65.7 kg)   Do you ever feel afraid of your partner? N  N N N   Are you in a relationship with someone who physically or mentally threatens you?  N    N  Temp Readings from Last 3 Encounters:   20 98.2 °F (36.8 °C) (Temporal)   20 97.8 °F (36.6 °C) (Oral)   19 97.8 °F (36.6 °C) (Oral)   sse  BP Readings from Last 3 Encounters:   20 157/78   20 128/60   19 114/69   /wa  Pulse Readings from Last 3 Encounters:   20 79   20 77   19 71   Lear  Vitals:    20 0833   BP: 157/78   Pulse: 79   Resp: 16   Temp: 98.2 °F (36.8 °C)   TempSrc: Temporal   SpO2: 95%   Weight: 149 lb 12.8 oz (67.9 kg)   Height: 5' 6\" (1.676 m)   PainSc:   0 - No pain   roslyn Assessment 2016   PRIMARY LEARNER Patient Patient   HIGHEST LEVEL OF EDUCATION - PRIMARY LEARNER  > 4 YEARS OF COLLEGE -   BARRIERS PRIMARY LEARNER NONE -   CO-LEARNER CAREGIVER No -   PRIMARY LANGUAGE ENGLISH ENGLISH   LEARNER PREFERENCE PRIMARY READING DEMONSTRATION   ANSWERED BY Patient patient   RELATIONSHIP SELF SELF   /2016 2/10/2015   Do you ever fe  3 most recent PHQ Screens 3/11/2020   Little interest or pleasure in doing things Not at all   Feeling down, depressed, irritable, or hopeless Not at all   Total Score PHQ 2 0   all Risk Assessment, last 12 mths 3/11/2020   Able to walk? Yes   Fall in past 12 months?  No

## 2020-07-10 ENCOUNTER — OFFICE VISIT (OUTPATIENT)
Dept: CARDIOLOGY CLINIC | Age: 75
End: 2020-07-10

## 2020-07-10 VITALS
HEIGHT: 66 IN | OXYGEN SATURATION: 98 % | WEIGHT: 149.3 LBS | RESPIRATION RATE: 16 BRPM | SYSTOLIC BLOOD PRESSURE: 140 MMHG | DIASTOLIC BLOOD PRESSURE: 80 MMHG | BODY MASS INDEX: 23.99 KG/M2 | HEART RATE: 78 BPM

## 2020-07-10 DIAGNOSIS — Z01.818 PRE-OPERATIVE CLEARANCE: ICD-10-CM

## 2020-07-10 DIAGNOSIS — R94.31 ABNORMAL EKG: Primary | ICD-10-CM

## 2020-07-10 DIAGNOSIS — I10 ESSENTIAL HYPERTENSION: ICD-10-CM

## 2020-07-10 RX ORDER — PREDNISOLONE ACETATE 10 MG/ML
1 SUSPENSION/ DROPS OPHTHALMIC DAILY
COMMUNITY
Start: 2020-07-01 | End: 2020-09-16

## 2020-07-10 RX ORDER — MOXIFLOXACIN 5 MG/ML
1 SOLUTION/ DROPS OPHTHALMIC DAILY
COMMUNITY
Start: 2020-07-01 | End: 2020-09-16

## 2020-07-10 NOTE — PROGRESS NOTES
1. Have you been to the ER, urgent care clinic since your last visit? Hospitalized since your last visit? NO    2. Have you seen or consulted any other health care providers outside of the [address] [place] [address] since your last visit? Include any pap smears or colon screening. NO    REFERRED BY PCP FOR ABNORMAL EKG.  NO CARDIAC C/O,

## 2020-07-10 NOTE — PROGRESS NOTES
Subjective/HPI:     Lala Lilly is a 66 y.o. female is here to establish care. She has a PMHx of DM2, HTN and depression. She was referred for cardiac clearance prior to bilateral cataract surgery. Pre-op EKG showed T-wave inversions in V1 & V2. Patient is asymptomatic, reports good functional tolerance. No prior cardiac history. Mother had MI. She does not smoke. PCP Provider  Wenceslao Aguirre MD    Past Medical History:   Diagnosis Date    Cancer [facility])     skin     Depression     mild with occasional use of prozac    Diabetes ([facility] [address]     Nausea & vomiting     Psychiatric disorder     depression        Past Surgical History:   Procedure Laterality Date    BREAST SURGERY PROCEDURE UNLISTED      left breast cystectomy    COLONOSCOPY N/A 11/16/2016    [facility]  11/16/2016         HX BREAST BIOPSY Left     benign cyst removed    HX HYSTERECTOMY      HX ORTHOPAEDIC      right hip arthroscopy - Dr. Diane Ochoa  [address]      right leg varicous vein removed    HX EDMUNDO AND BSO  1992     paps  wnl fibroids    HX TONSILLECTOMY      [place] COLSC FLX W/REMOVAL LESION BY HOT BX FORCEPS  8/22/2011             family history includes Hypertension in her brother. Social History     Socioeconomic History    Marital status: SINGLE     Spouse name: Not on file    Number of children: Not on file    Years of education: Not on file    Highest education level: Not on file   Occupational History    Not on file   Social Needs    Financial resource strain: Not on file    Food insecurity     Worry: Not on file     Inability: Not on file    Transportation needs     Medical: Not on file     Non-medical: Not on file   Tobacco Use    Smoking status: Never Smoker    Smokeless tobacco: Never Used   Substance and Sexual Activity    Alcohol use:  Yes     Alcohol/week: 0.0 standard drinks     Comment: occassionally    Drug use: No    Sexual activity: Not Currently     Partners: Male     Birth control/protection: None   Lifestyle    Physical activity     Days per week: Not on file     Minutes per session: Not on file    Stress: Not on file   Relationships    Social connections     Talks on phone: Not on file     Gets together: Not on file     Attends [employer] service: Not on file     Active member of club or organization: Not on file     Attends meetings of clubs or organizations: Not on file     Relationship status: Not on file    Intimate partner violence     Fear of current or ex partner: Not on file     Emotionally abused: Not on file     Physically abused: Not on file     Forced sexual activity: Not on file   Other Topics Concern    Not on file   Social History Narrative    Not on file       Allergies   Allergen Reactions    Lisinopril Cough    Simvastatin Myalgia        Outpatient Encounter Medications as of 7/10/2020   Medication Sig Dispense Refill    aspirin delayed-release 81 mg tablet Take 1 Tab by mouth daily. 30 Tab 3    insulin glargine (Lantus U-100 Insulin) 100 unit/mL injection INJECT 14 UNITS EVERY MORNING AND 3 UNITS EVERY EVENING FOR DIABETES CONTROL 10 mL 10    lancets (OneTouch UltraSoft Lancets) misc USE TO TEST BLOOD SUGAR THREE TIMES A  Each 5    spironolactone (ALDACTONE) 25 mg tablet Take 1 Tab by mouth daily.  90 Tab 3    ONETOUCH ULTRA BLUE TEST STRIP strip USE TO TEST BLOOD SUGAR THREE TIMES A  Strip 3    Insulin Syringe-Needle U-100 (BD INSULIN SYRINGE ULTRA-FINE) 0.3 mL 31 gauge x 5/16\" syrg USE AS DIRECTED TWICE DAILY 100 Syringe 5    metFORMIN (GLUCOPHAGE) 500 mg tablet TAKE 2 TABLETS BY MOUTH EVERY MORNING AND TAKE 1 TABLET BY MOUTH AT NOON AND DINNERTIME 360 Tab 3    losartan (COZAAR) 50 mg tablet take 1 tablet by mouth once daily 90 Tab 3    Calcium Carbonate-Vit D3-Min (CALTRATE 600+D PLUS MINERALS) 600 mg (1,500 mg)-400 unit Chew Take  by mouth daily.  multivitamins-minerals-lutein (CENTRUM SILVER) Tab Take  by mouth.  cholecalciferol, vitamin d3, (VITAMIN D) 1,000 unit tablet Take  by mouth daily. No facility-administered encounter medications on file as of 7/10/2020. Review of Symptoms:    Review of Systems   Constitutional: Negative for chills, fever and weight loss. HENT: Negative for nosebleeds. Eyes: Negative for blurred vision and double vision. Respiratory: Negative for cough, shortness of breath and wheezing. Cardiovascular: Negative for chest pain, palpitations, orthopnea, leg swelling and PND. Gastrointestinal: Negative for abdominal pain, blood in stool, diarrhea, nausea and vomiting. Musculoskeletal: Negative for joint pain. Skin: Negative for rash. Neurological: Negative for dizziness, tingling and loss of consciousness. Endo/Heme/Allergies: Does not bruise/bleed easily. Physical Exam:      General: Well developed, in no acute distress, cooperative and alert  HEENT: No carotid bruits, no JVD, trach is midline. Neck Supple, PEERL, EOM intact. Heart:  reg rate and rhythm; normal S1/S2; no murmurs, no gallops or rubs. Respiratory: Clear bilaterally x 4, no wheezing or rales  Abdomen:   Soft, non-tender, no distention, no masses. + BS. Extremities:  Normal cap refill, no cyanosis, atraumatic. No edema. Neuro: A&Ox3, speech clear, gait stable. Skin: Skin color is normal. No rashes or lesions. Non diaphoretic  Vascular: 2+ pulses symmetric in all extremities    There were no vitals taken for this visit.     ECG: sinus rhythm T wave inversions in septal leads    Cardiology Labs:    Lab Results   Component Value Date/Time    Cholesterol, total 172 02/26/2020 07:49 AM    HDL Cholesterol 72 02/26/2020 07:49 AM    VLDL, calculated 16 02/26/2020 07:49 AM    CHOL/HDL Ratio 1.5 11/01/2010 08:07 AM    LDL, calculated 84 02/26/2020 07:49 AM    LDL, calculated 74 02/06/2019 08:36 AM    LDL, calculated 74 08/15/2018 08:13 AM       Lab Results   Component Value Date/Time    Hemoglobin A1c 7.2 (H) 02/26/2020 07:49 AM    Hemoglobin A1c (POC) 6.6 09/05/2019 03:06 PM       Lab Results   Component Value Date/Time    Sodium 140 02/26/2020 07:49 AM    Potassium 4.2 02/26/2020 07:49 AM    Chloride 103 02/26/2020 07:49 AM    CO2 24 02/26/2020 07:49 AM    Glucose 121 (H) 02/26/2020 07:49 AM    BUN 18 02/26/2020 07:49 AM    Creatinine 0.77 02/26/2020 07:49 AM    BUN/Creatinine ratio 23 02/26/2020 07:49 AM    GFR est AA 87 02/26/2020 07:49 AM    GFR est non-AA 75 02/26/2020 07:49 AM    Calcium 9.4 02/26/2020 07:49 AM    Anion gap 9 11/01/2010 08:07 AM    Bilirubin, total 0.4 02/26/2020 07:49 AM    ALT (SGPT) 21 02/26/2020 07:49 AM    Alk. phosphatase 51 02/26/2020 07:49 AM    Protein, total 6.3 02/26/2020 07:49 AM    Albumin 4.2 02/26/2020 07:49 AM    Globulin 2.4 11/01/2010 08:07 AM    A-G Ratio 2.0 02/26/2020 07:49 AM          Assessment:     Assessment:       ICD-10-CM ICD-9-CM    1. Abnormal EKG  R94.31 794.31    2. Pre-operative clearance  Z01.818 V72.84    3. Essential hypertension  I10 401.9         Plan:     1. Abnormal EKG  Will evaluate with lexiscan stress test. Her surgery is next Wednesday and COVID result will not be back in time to do an exercise stress test. She is high risk given her DM, age, family history and abnormal EKG. Check echocardiogram    2. Pre-operative clearance  Cardiac clearance pending results of stress test and echocardiogram    3. Essential hypertension  BP controlled.   Continue anti-hypertensive therapy and low sodium diet    F/u  once testing complete    Nikko Esposito MD

## 2020-07-11 ENCOUNTER — HOSPITAL ENCOUNTER (OUTPATIENT)
Dept: PREADMISSION TESTING | Age: 75
Discharge: HOME | End: 2020-07-11
Payer: MEDICARE

## 2020-07-11 PROCEDURE — 87635 SARS-COV-2 COVID-19 AMP PRB: CPT

## 2020-07-12 LAB
SARS-COV-2, COV2NT: NOT DETECTED
SOURCE, COVRS: NORMAL
SPECIMEN SOURCE, FCOV2M: NORMAL

## 2020-07-21 ENCOUNTER — VIRTUAL VISIT (OUTPATIENT)
Dept: INTERNAL MEDICINE CLINIC | Facility: CLINIC | Age: 75
End: 2020-07-21

## 2020-07-21 ENCOUNTER — TELEPHONE (OUTPATIENT)
Dept: INTERNAL MEDICINE CLINIC | Facility: CLINIC | Age: 75
End: 2020-07-21

## 2020-07-21 DIAGNOSIS — M25.552 LEFT HIP PAIN: Primary | ICD-10-CM

## 2020-07-21 RX ORDER — MELOXICAM 7.5 MG/1
7.5-15 TABLET ORAL
Qty: 30 TAB | Refills: 1 | Status: SHIPPED | OUTPATIENT
Start: 2020-07-21 | End: 2020-11-16 | Stop reason: SDUPTHER

## 2020-07-21 NOTE — TELEPHONE ENCOUNTER
PATIENT CALLED TO SAY HER HEART TESTS WERE NORMAL AND SHE NEEDS THE FORM COMPLETED FOR THE APPROVAL OF HER CATARACT SURGERY PLEASE CALL TO ADVISE  [phone]

## 2020-07-21 NOTE — PROGRESS NOTES
Luana Taylor is a 66 y.o. female who was seen by synchronous (real-time) audio-video technology on 7/21/2020 for Hip Pain (Left // x 1 month // Worse when trying to walk for a length of time )        Assessment & Plan:   Diagnoses and all orders for this visit:    1. Left hip pain  -     meloxicam (MOBIC) 7.5 mg tablet; Take 1-2 Tabs by mouth daily as needed for Pain. suspect bursitis vs tendonitis vs oa - treat as above, ice, rest, be seen by ortho if worsens      Subjective:     Complains of left hip pain for at least a month, getting worse. Worse when walking for exercise. Normally walks 45-60min 2 mornings per week but hasn't been able to walk since last week due to pain - had to stop after 5 min. No known injury. Leg not weak, no pain at rest. No pain if lying on that side. Pain worse laterally. Ice helps. No change in back pain. Was taking ibuprofen or tylenol for pain, helped some. Was only taking ibuprofen 200mg when walking - few days per week. Prior to Admission medications    Medication Sig Start Date End Date Taking? Authorizing Provider   prednisoLONE acetate (PRED FORTE) 1 % ophthalmic suspension Administer 1 Drop to left eye daily. 7/1/20  Yes Provider, Historical   moxifloxacin (VIGAMOX) 0.5 % ophthalmic solution Administer 1 Drop to left eye daily. 7/1/20  Yes Provider, Historical   aspirin delayed-release 81 mg tablet Take 1 Tab by mouth daily. 7/2/20  Yes Keven Quinones NP   insulin glargine (Lantus U-100 Insulin) 100 unit/mL injection INJECT 14 UNITS EVERY MORNING AND 3 UNITS EVERY EVENING FOR DIABETES CONTROL 3/30/20  Yes Tiffany Shearer MD   lancets (OneTouch UltraSoft Lancets) misc USE TO TEST BLOOD SUGAR THREE TIMES A DAY 3/17/20  Yes Tiffany Shearer MD   spironolactone (ALDACTONE) 25 mg tablet Take 1 Tab by mouth daily.  3/11/20  Yes Tiffany Shearer MD   [address] TEST STRIP strip USE TO TEST BLOOD SUGAR THREE TIMES A DAY 3/5/20  Yes Felisa Tracy, Emerald Mcclendon MD   Insulin Syringe-Needle U-100 (BD INSULIN SYRINGE ULTRA-FINE) 0.3 mL 31 gauge x 5/16\" syrg USE AS DIRECTED TWICE DAILY 11/14/19  Yes Parvin Brothers MD   metFORMIN (GLUCOPHAGE) 500 mg tablet TAKE 2 TABLETS BY MOUTH EVERY MORNING AND TAKE 1 TABLET BY MOUTH AT NOON AND DINNERTIME 9/10/19  Yes Parvin Brothers MD   losartan (COZAAR) 50 mg tablet take 1 tablet by mouth once daily 8/7/19  Yes Parvin Brothers MD   Calcium Carbonate-Vit D3-Min (CALTRATE 600+D PLUS MINERALS) 600 mg (1,500 mg)-400 unit Chew Take  by mouth daily. 6/27/12  Yes Chantelle Gonzalez MD   multivitamins-minerals-lutein (CENTRUM SILVER) Tab Take  by mouth. Yes Provider, Historical   cholecalciferol, vitamin d3, (VITAMIN D) 1,000 unit tablet Take  by mouth daily.    Yes Provider, Historical         ROS  Per HPI  Objective:     Patient-Reported Vitals 7/21/2020   Patient-Reported Weight 149.0lb   Patient-Reported Height 5ft6in        [INSTRUCTIONS:  \"[x]\" Indicates a positive item  \"[]\" Indicates a negative item  -- DELETE ALL ITEMS NOT EXAMINED]    Constitutional: [x] Appears well-developed and well-nourished [x] No apparent distress      [] Abnormal -     Mental status: [x] Alert and awake  [x] Oriented to person/place/time [x] Able to follow commands    [] Abnormal -     Eyes:   EOM    [x]  Normal    [] Abnormal -   Sclera  [x]  Normal    [] Abnormal -          Discharge [x]  None visible   [] Abnormal -     HENT: [x] Normocephalic, atraumatic  [] Abnormal -   [x] Mouth/Throat: Mucous membranes are moist    External Ears [x] Normal  [] Abnormal -    Neck: [x] No visualized mass [] Abnormal -     Pulmonary/Chest: [x] Respiratory effort normal   [x] No visualized signs of difficulty breathing or respiratory distress        [] Abnormal -      Musculoskeletal:   [x] Normal gait with no signs of ataxia         [x] Normal range of motion of neck        [] Abnormal -     Neurological:        [x] No Facial Asymmetry (Cranial nerve 7 motor function) (limited exam due to video visit)          [x] No gaze palsy        [] Abnormal -          Skin:        [x] No significant exanthematous lesions or discoloration noted on facial skin         [] Abnormal -            Psychiatric:       [x] Normal Affect [] Abnormal -        [x] No Hallucinations    Other pertinent observable physical exam findings:-        We discussed the expected course, resolution and complications of the diagnosis(es) in detail. Medication risks, benefits, costs, interactions, and alternatives were discussed as indicated. I advised her to contact the office if her condition worsens, changes or fails to improve as anticipated. She expressed understanding with the diagnosis(es) and plan. Gilberto Tellez, who was evaluated through a patient-initiated, synchronous (real-time) audio-video encounter, and/or her healthcare decision maker, is aware that it is a billable service, with coverage as determined by her insurance carrier. She provided verbal consent to proceed: Yes, and patient identification was verified. It was conducted pursuant to the emergency declaration under the [address] [place] [address], [address] [place] [address] authority and the [employer] General Act. A caregiver was present when appropriate. Ability to conduct physical exam was limited. I was at home. The patient was at home.       Maribel Harry MD

## 2020-07-21 NOTE — PROGRESS NOTES
Carolyn Cowan  Identified pt with two pt identifiers(name and ). Chief Complaint   Patient presents with    Hip Pain     Left // x 1 month // Worse when trying to walk for a length of time        Reviewed record In preparation for visit and have obtained necessary documentation. 1. Have you been to the ER, urgent care clinic or hospitalized since your last visit? No     2. Have you seen or consulted any other health care providers outside of the [address] [place] [address] since your last visit? Include any pap smears or colon screening. No    Patient has an advance directive. Vitals reviewed with provider. Health Maintenance reviewed: There are no preventive care reminders to display for this patient. Wt Readings from Last 3 Encounters:   20 149 lb (67.6 kg)   20 149 lb (67.6 kg)   07/10/20 149 lb 4.8 oz (67.7 kg)        Temp Readings from Last 3 Encounters:   20 98.2 °F (36.8 °C) (Temporal)   20 97.8 °F (36.6 °C) (Oral)   19 97.8 °F (36.6 °C) (Oral)        BP Readings from Last 3 Encounters:   20 120/80   20 120/80   07/10/20 140/80        Pulse Readings from Last 3 Encounters:   07/10/20 78   20 79   20 77      There were no vitals filed for this visit.        Learning Assessment:   :       Learning Assessment 2016   PRIMARY LEARNER Patient Patient   HIGHEST LEVEL OF EDUCATION - PRIMARY LEARNER  > 4 YEARS OF COLLEGE -   BARRIERS PRIMARY LEARNER NONE -   CO-LEARNER CAREGIVER No -   PRIMARY LANGUAGE ENGLISH ENGLISH   LEARNER PREFERENCE PRIMARY READING DEMONSTRATION   ANSWERED BY Patient patient   RELATIONSHIP SELF SELF        Depression Screening:   :       3 most recent PHQ Screens 7/10/2020   Little interest or pleasure in doing things Not at all   Feeling down, depressed, irritable, or hopeless Not at all   Total Score PHQ 2 0        Fall Risk Assessment:   :       Fall Risk Assessment, last 12 mths 3/11/2020 Able to walk? Yes   Fall in past 12 months? No        Abuse Screening:   :       Abuse Screening Questionnaire 7/2/2020 9/5/2019 8/20/2018 8/24/2016 2/10/2015   Do you ever feel afraid of your partner? N N N N N   Are you in a relationship with someone who physically or mentally threatens you? N N N N N   Is it safe for you to go home?  Y Y Y Y Y        ADL Screening:   :       ADL Assessment 8/20/2018   Feeding yourself No Help Needed   Getting from bed to chair No Help Needed   Getting dressed No Help Needed   Bathing or showering No Help Needed   Walk across the room (includes cane/walker) No Help Needed   Using the telphone No Help Needed   Taking your medications No Help Needed   Preparing meals No Help Needed   Managing money (expenses/bills) No Help Needed   Moderately strenuous housework (laundry) No Help Needed   Shopping for personal items (toiletries/medicines) No Help Needed   Shopping for groceries No Help Needed   Driving No Help Needed   Climbing a flight of stairs No Help Needed   Getting to places beyond walking distances No Help Needed

## 2020-07-27 NOTE — PERIOP NOTES
[facility]  Ambulatory Surgery Unit  Pre-operative Instructions    Surgery/Procedure Date  Wednesday, August 5, 2020            Tentative Arrival Time TBD      1. On the day of your surgery/procedure, please report to the Ambulatory Surgery Unit Registration Desk and sign in at your designated time. The Ambulatory Surgery Unit is located in [facility] on the [facility] side of the [facility] across from the [address] [place] [address]. Please have all of your health insurance cards and a photo ID. 2. You must have someone with you to drive you home, as you should not drive a car for 24 hours following anesthesia. Please make arrangements for a responsible adult friend or family member to stay with you for at least the first 24 hours after your surgery. 3. Do not have anything to eat or drink (including water, gum, mints, coffee, juice) after 11:59 PM, Tuesday. This may not apply to medications prescribed by your physician. (Please note below the special instructions with medications to take the morning of surgery, if applicable.)    4. We recommend you do not drink any alcoholic beverages for 24 hours before and after your surgery. 5. Contact your surgeons office for instructions on the following medications: non-steroidal anti-inflammatory drugs (i.e. Advil, Aleve), vitamins, and supplements. (Some surgeons will want you to stop these medications prior to surgery and others may allow you to take them)   **If you are currently taking Plavix, Coumadin, Aspirin and/or other blood-thinning agents, contact your surgeon for instructions. ** Your surgeon will partner with the physician prescribing these medications to determine if it is safe to stop or if you need to continue taking. Please do not stop taking these medications without instructions from your surgeon.     6. In an effort to help prevent surgical site infection, we ask that you shower with an anti-bacterial soap (i.e. Dial/Safeguard, or the soap provided to you at your preadmission testing appointment) for 3 days prior to and on the morning of surgery, using a fresh towel after each shower. (Please begin this process with fresh bed linens.) Do not apply any lotions, powders, or deodorants after the shower on the day of your procedure. If applicable, please do not shave the operative site for 48 hours prior to surgery. 7. Wear comfortable clothes. Wear glasses instead of contacts. Do not bring any jewelry or money (other than copays or fees as instructed). Do not wear make-up, particularly mascara, the morning of your surgery. Do not wear nail polish, particularly if you are having foot /hand surgery. Wear your hair loose or down, no ponytails, buns, dominick pins or clips. All body piercings must be removed. 8. You should understand that if you do not follow these instructions your surgery may be cancelled. If your physical condition changes (i.e. fever, cold or flu) please contact your surgeon as soon as possible. 9. It is important that you be on time. If a situation occurs where you may be late, or if you have any questions or problems, please call [phone].    10. Your surgery time may be subject to change. You will receive a phone call the day prior to surgery to confirm your arrival time. 11. Pediatric patients: please bring a change of clothes, diapers, bottle/sippy cup, pacifier, etc.      Special Instructions: Take all medications and inhalers, as prescribed, on the morning of surgery with a sip of water EXCEPT: no diabetic medications day of surgery. Insulin Dependent Diabetic patients: Take your diabetic medications as prescribed the day before surgery. Hold all diabetic medications the day of surgery. If you are scheduled to arrive for surgery after 8:00 AM, and your AM blood sugar is >200, please call Ambulatory Surgery.     I understand a pre-operative phone call will be made to verify my surgery time. In the event that I am not available, I give permission for a message to be left on my answering service and/or with another person?       yes    Preop instructions reviewed  Pt verbalized understanding.      ___________________      ___________________      ________________  (Signature of Patient)          (Witness)                   (Date and Time)

## 2020-07-27 NOTE — PERIOP NOTES
Per jillian, Dr. Melisa Whaley will do H&P DOS, pt may take mobic if it helps, she does not need to stop like PCP told her.

## 2020-07-30 RX ORDER — LOSARTAN POTASSIUM 50 MG/1
TABLET ORAL
Qty: 90 TAB | Refills: 3 | Status: SHIPPED | OUTPATIENT
Start: 2020-07-30 | End: 2021-07-19

## 2020-08-01 ENCOUNTER — HOSPITAL ENCOUNTER (OUTPATIENT)
Dept: PREADMISSION TESTING | Age: 75
Discharge: HOME | End: 2020-08-01
Payer: MEDICARE

## 2020-08-01 PROBLEM — H25.812 COMBINED FORMS OF AGE-RELATED CATARACT OF LEFT EYE: Status: ACTIVE | Noted: 2020-08-01

## 2020-08-01 PROCEDURE — 87635 SARS-COV-2 COVID-19 AMP PRB: CPT

## 2020-08-02 LAB
SARS-COV-2, COV2NT: NOT DETECTED
SOURCE, COVRS: NORMAL
SPECIMEN SOURCE, FCOV2M: NORMAL

## 2020-08-04 ENCOUNTER — ANESTHESIA EVENT (OUTPATIENT)
Dept: SURGERY | Age: 75
End: 2020-08-04
Payer: MEDICARE

## 2020-08-05 ENCOUNTER — ANESTHESIA (OUTPATIENT)
Dept: SURGERY | Age: 75
End: 2020-08-05
Payer: MEDICARE

## 2020-08-05 ENCOUNTER — HOSPITAL ENCOUNTER (OUTPATIENT)
Age: 75
Setting detail: OUTPATIENT SURGERY
Discharge: HOME | End: 2020-08-05
Attending: OPHTHALMOLOGY | Admitting: OPHTHALMOLOGY
Payer: MEDICARE

## 2020-08-05 VITALS
TEMPERATURE: 97.7 F | BODY MASS INDEX: 24.04 KG/M2 | HEIGHT: 66 IN | WEIGHT: 149.6 LBS | OXYGEN SATURATION: 100 % | DIASTOLIC BLOOD PRESSURE: 62 MMHG | RESPIRATION RATE: 13 BRPM | SYSTOLIC BLOOD PRESSURE: 138 MMHG | HEART RATE: 62 BPM

## 2020-08-05 LAB
GLUCOSE BLD STRIP.AUTO-MCNC: 157 MG/DL (ref 65–100)
GLUCOSE BLD STRIP.AUTO-MCNC: 216 MG/DL (ref 65–100)
SERVICE CMNT-IMP: ABNORMAL
SERVICE CMNT-IMP: ABNORMAL

## 2020-08-05 PROCEDURE — 76030000000 HC AMB SURG OR TIME 0.5 TO 1: Performed by: OPHTHALMOLOGY

## 2020-08-05 PROCEDURE — 74011000250 HC RX REV CODE- 250

## 2020-08-05 PROCEDURE — 74011250636 HC RX REV CODE- 250/636: Performed by: NURSE ANESTHETIST, CERTIFIED REGISTERED

## 2020-08-05 PROCEDURE — 82962 GLUCOSE BLOOD TEST: CPT

## 2020-08-05 PROCEDURE — 76060000061 HC AMB SURG ANES 0.5 TO 1 HR: Performed by: OPHTHALMOLOGY

## 2020-08-05 PROCEDURE — 74011000250 HC RX REV CODE- 250: Performed by: OPHTHALMOLOGY

## 2020-08-05 PROCEDURE — 74011250637 HC RX REV CODE- 250/637: Performed by: OPHTHALMOLOGY

## 2020-08-05 PROCEDURE — 76210000050 HC AMBSU PH II REC 0.5 TO 1 HR: Performed by: OPHTHALMOLOGY

## 2020-08-05 PROCEDURE — 74011250636 HC RX REV CODE- 250/636: Performed by: OPHTHALMOLOGY

## 2020-08-05 PROCEDURE — V2630 ANTER CHAMBER INTRAOCUL LENS: HCPCS | Performed by: OPHTHALMOLOGY

## 2020-08-05 PROCEDURE — V2787 ASTIGMATISM-CORRECT FUNCTION: HCPCS | Performed by: OPHTHALMOLOGY

## 2020-08-05 PROCEDURE — 77030018846 HC SOL IRR STRL H20 ICUM -A: Performed by: OPHTHALMOLOGY

## 2020-08-05 DEVICE — IMPLANTABLE DEVICE: Type: IMPLANTABLE DEVICE | Site: EYE | Status: FUNCTIONAL

## 2020-08-05 RX ORDER — SODIUM CHLORIDE, SODIUM LACTATE, POTASSIUM CHLORIDE, CALCIUM CHLORIDE 600; 310; 30; 20 MG/100ML; MG/100ML; MG/100ML; MG/100ML
25 INJECTION, SOLUTION INTRAVENOUS CONTINUOUS
Status: DISCONTINUED | OUTPATIENT
Start: 2020-08-05 | End: 2020-08-05 | Stop reason: HOSPADM

## 2020-08-05 RX ORDER — MIDAZOLAM HYDROCHLORIDE 1 MG/ML
INJECTION, SOLUTION INTRAMUSCULAR; INTRAVENOUS AS NEEDED
Status: DISCONTINUED | OUTPATIENT
Start: 2020-08-05 | End: 2020-08-05 | Stop reason: HOSPADM

## 2020-08-05 RX ORDER — ERYTHROMYCIN 5 MG/G
OINTMENT OPHTHALMIC ONCE
Status: COMPLETED | OUTPATIENT
Start: 2020-08-05 | End: 2020-08-05

## 2020-08-05 RX ORDER — DIPHENHYDRAMINE HYDROCHLORIDE 50 MG/ML
12.5 INJECTION, SOLUTION INTRAMUSCULAR; INTRAVENOUS AS NEEDED
Status: DISCONTINUED | OUTPATIENT
Start: 2020-08-05 | End: 2020-08-05 | Stop reason: HOSPADM

## 2020-08-05 RX ORDER — TROPICAMIDE 10 MG/ML
SOLUTION/ DROPS OPHTHALMIC
Status: COMPLETED
Start: 2020-08-05 | End: 2020-08-05

## 2020-08-05 RX ORDER — ONDANSETRON 2 MG/ML
4 INJECTION INTRAMUSCULAR; INTRAVENOUS AS NEEDED
Status: DISCONTINUED | OUTPATIENT
Start: 2020-08-05 | End: 2020-08-05 | Stop reason: HOSPADM

## 2020-08-05 RX ORDER — POVIDONE-IODINE 10 %
SOLUTION, NON-ORAL TOPICAL
Status: COMPLETED | OUTPATIENT
Start: 2020-08-05 | End: 2020-08-05

## 2020-08-05 RX ORDER — SODIUM CHLORIDE 9 MG/ML
25 INJECTION, SOLUTION INTRAVENOUS CONTINUOUS
Status: DISCONTINUED | OUTPATIENT
Start: 2020-08-05 | End: 2020-08-05 | Stop reason: HOSPADM

## 2020-08-05 RX ORDER — KETOROLAC TROMETHAMINE 5 MG/ML
1 SOLUTION OPHTHALMIC ONCE
Status: COMPLETED | OUTPATIENT
Start: 2020-08-05 | End: 2020-08-05

## 2020-08-05 RX ORDER — SODIUM CHLORIDE 0.9 % (FLUSH) 0.9 %
5-40 SYRINGE (ML) INJECTION EVERY 8 HOURS
Status: DISCONTINUED | OUTPATIENT
Start: 2020-08-05 | End: 2020-08-05 | Stop reason: HOSPADM

## 2020-08-05 RX ORDER — PROPARACAINE HYDROCHLORIDE 5 MG/ML
1 SOLUTION/ DROPS OPHTHALMIC ONCE
Status: COMPLETED | OUTPATIENT
Start: 2020-08-05 | End: 2020-08-05

## 2020-08-05 RX ORDER — TROPICAMIDE 10 MG/ML
1 SOLUTION/ DROPS OPHTHALMIC ONCE
Status: COMPLETED | OUTPATIENT
Start: 2020-08-05 | End: 2020-08-05

## 2020-08-05 RX ORDER — LIDOCAINE HYDROCHLORIDE 40 MG/ML
3 INJECTION, SOLUTION RETROBULBAR; TOPICAL ONCE
Status: COMPLETED | OUTPATIENT
Start: 2020-08-05 | End: 2020-08-05

## 2020-08-05 RX ORDER — SODIUM CHLORIDE 0.9 % (FLUSH) 0.9 %
5-40 SYRINGE (ML) INJECTION AS NEEDED
Status: DISCONTINUED | OUTPATIENT
Start: 2020-08-05 | End: 2020-08-05 | Stop reason: HOSPADM

## 2020-08-05 RX ORDER — PREDNISOLONE ACETATE 10 MG/ML
1 SUSPENSION/ DROPS OPHTHALMIC 2 TIMES DAILY
Status: DISCONTINUED | OUTPATIENT
Start: 2020-08-05 | End: 2020-08-05 | Stop reason: HOSPADM

## 2020-08-05 RX ORDER — FENTANYL CITRATE 50 UG/ML
25 INJECTION, SOLUTION INTRAMUSCULAR; INTRAVENOUS
Status: DISCONTINUED | OUTPATIENT
Start: 2020-08-05 | End: 2020-08-05 | Stop reason: HOSPADM

## 2020-08-05 RX ORDER — TETRACAINE HYDROCHLORIDE 5 MG/ML
1 SOLUTION OPHTHALMIC
Status: ACTIVE | OUTPATIENT
Start: 2020-08-05 | End: 2020-08-05

## 2020-08-05 RX ORDER — LIDOCAINE HYDROCHLORIDE 10 MG/ML
0.1 INJECTION, SOLUTION EPIDURAL; INFILTRATION; INTRACAUDAL; PERINEURAL AS NEEDED
Status: DISCONTINUED | OUTPATIENT
Start: 2020-08-05 | End: 2020-08-05 | Stop reason: HOSPADM

## 2020-08-05 RX ADMIN — MIDAZOLAM HYDROCHLORIDE 0.5 MG: 1 INJECTION, SOLUTION INTRAMUSCULAR; INTRAVENOUS at 08:09

## 2020-08-05 RX ADMIN — Medication 3 AMPULE: at 06:41

## 2020-08-05 RX ADMIN — TROPICAMIDE 1 DROP: 10 SOLUTION/ DROPS OPHTHALMIC at 06:39

## 2020-08-05 RX ADMIN — SODIUM CHLORIDE 25 ML/HR: 900 INJECTION, SOLUTION INTRAVENOUS at 06:38

## 2020-08-05 RX ADMIN — PROPARACAINE HYDROCHLORIDE 1 DROP: 5 SOLUTION/ DROPS OPHTHALMIC at 06:38

## 2020-08-05 RX ADMIN — MIDAZOLAM HYDROCHLORIDE 0.5 MG: 1 INJECTION, SOLUTION INTRAMUSCULAR; INTRAVENOUS at 08:23

## 2020-08-05 RX ADMIN — KETOROLAC TROMETHAMINE 1 DROP: 5 SOLUTION/ DROPS OPHTHALMIC at 06:39

## 2020-08-05 RX ADMIN — MIDAZOLAM HYDROCHLORIDE 1 MG: 1 INJECTION, SOLUTION INTRAMUSCULAR; INTRAVENOUS at 08:26

## 2020-08-05 NOTE — PERIOP NOTES
IV out, tip intact without redness or swelling, Vital signs are stable, discharge instructions have been reviewed and signed. Care plans and education completed. Pt home with family.

## 2020-08-05 NOTE — PERIOP NOTES
Modestosharlene Fortune  [birth year]  [id number]    Situation:  Verbal report given from: MANOHAR Rayo RN/ DE Spirits CRNA  Procedure: Procedure(s):  LEFT EYE FIRST REFRACTIVE CATARACT REMOVAL WITH LENS IMPLANTATION    Background:    Preoperative diagnosis: Combined forms of age-related cataract of left eye [H25.812]    Postoperative diagnosis: Combined forms of age-related cataract of left eye [H25.812]    :  Dr. Phoebe Aguilar    Assistant(s): Circ-1: Mina Combs RN  Scrub Tech-1: Vero Samaniego    Specimens: * No specimens in log *    Assessment:  Intra-procedure medications         Anesthesia gave intra-procedure sedation and medications, see anesthesia flow sheet     Intravenous fluids: LR@ KVO     Vital signs stable VSS      Recommendation:    Permission to share finding with Sundeep Suffield ( Friend) : yes

## 2020-08-05 NOTE — ANESTHESIA POSTPROCEDURE EVALUATION
Procedure(s):  LEFT EYE FIRST REFRACTIVE CATARACT REMOVAL WITH LENS IMPLANTATION. MAC    Anesthesia Post Evaluation      Multimodal analgesia: multimodal analgesia not used between 6 hours prior to anesthesia start to PACU discharge  Patient location during evaluation: PACU  Patient participation: complete - patient participated  Level of consciousness: awake and alert  Pain score: 0  Airway patency: patent  Anesthetic complications: no  Cardiovascular status: acceptable  Respiratory status: acceptable  Hydration status: acceptable  Post anesthesia nausea and vomiting:  none  Final Post Anesthesia Temperature Assessment:  Normothermia (36.0-37.5 degrees C)      INITIAL Post-op Vital signs: No vitals data found for the desired time range.

## 2020-08-05 NOTE — OP NOTES
Name: Daryle Peng MASC-4        updated 3/1/2013  Description:  Lorne Strickland with intraocular lens implant    PREOPERATIVE DIAGNOSIS: Visually impairing combined cataract, Left eye. POSTOPERATIVE DIAGNOSIS: Visually impairing combined   cataract,Left eye. OPERATION: Procedure(s):  LEFT EYE FIRST REFRACTIVE CATARACT REMOVAL WITH TORIC LENS IMPLANTATION    ANESTHESIA: Topical with intravenous sedation    TYPE OF LENS IMPLANT USED:   Implant Name Type Inv. Item Serial No.  Lot No. LRB No. Used Action   ACRYSOF IQ TORIC ASTIMATISM IOL   [id number] [employer]  Left 1 Implanted       PHACO TIME:   14.6 seconds at an average power of 50.1%. Estimated blood loss: None    Complications:None    Specimen removed: None    DESCRIPTION OF PROCEDURE:  The patient was brought to the holding area where an IV was begun at a  keep open rate. The patient was connected to cardiovascular monitoring. The patient received 1 drop of mydriacyl 1% and proparacaine 0.5%. The patient was then brought back to the operating suite and cardiovascular monitoring was reestablished. The patient was  prepped and draped in the usual manner for ocular surgery. The patient received 1 drop of Proparacaine followed by 2 drops of 5% Betadine solution in the inferior conjunctival cul-de-sac The operating microscope was brought into position and 4% Xylocaine drops were instilled onto the eye. The lid speculum was set into position. A paracentesis was created and Sugarcane solution was instilled into the anterior chamber for adequate anesthesia and pupillary dilation. Viscoelastic was instilled into the anterior chamber. The 2.4 mm keratome was then utilized to create a clear corneal incision, and a circular capsulorrhexis was performed. BSS was utilized to perform careful hydrodissection of the lens. Viscoelastic was then instilled into the anterior chamber to protect the corneal endothelium.  Phacoemulsification was then carried out. Any remaining cortical material was removed in irrigation/aspiration mode. Viscoelastic was then instilled into the capsular bag. The lens implant was inspected for any defects. After finding none, the lens was placed in its injector and inserted into the capsular bag. The lens implant was centered on the patient's visual/astigmatic axis. The viscoelastic was then removed from the eye. Miochol was instilled posterior to the iris plane to facilitate pupillary miosis. The wound was checked for any leaks, after finding none, Iopidine and Pred Forte drops were instilled into the inferior cul-de-sac, and erythromycin ointment was placed over the cornea. A semi-pressure dressing and shield were then placed for the patient. The patient tolerated the procedure very well, and was brought to the recovery room in an alert and stable and satisfactory postoperative condition. Instructions were given to the patient and their family for their immediate postoperative care.   [address] [place] [address],   8/5/2020

## 2020-08-05 NOTE — DISCHARGE INSTRUCTIONS
Humphrey Gottron, D.O., PClayCClay  [facility], [place] [address].  [phone]    Post-Operative Instructions for Cataract Surgery     Remove your eye shield and bandage at 12 noon the same day as surgery and start your eye drops. THROW AWAY THE GAUZE UNDER THE SHIELD.  Place the blue eye shield back on for one week while asleep, even if napping in the recliner. Use the tape included in your bag.  DO NOT RUB YOUR EYE EVER! DO NOT WIPE YOUR EYE! ONLY WIPE ON YOUR CHEEK!                DO NOT WEAR EYE MAKEUP FOR 1 WEEK!  You can shower starting tomorrow.  You may resume your previous diet.  If you use glaucoma drops in the operative eye, continue them as directed.  Common symptoms after surgery include a scratchy feeling, slight headache, red eye, and/or blurred vision. You may use Advil or Tylenol for any discomfort.  Avoid strenuous activities and driving until you see Dr. Kiel Rockwell tomorrow. Please use care when walking, your depth perception may be altered.  Bring your bag with your drops to your follow up appointment. Below are Instructions On How To Use Your Eye Drops. ON THE DAY OF SURGERY:     Use all three eye drops at 12 noon, 4pm, and 8pm.  Wait 10 minutes between drops. THE DAY AFTER SURGERY:     You will use the drops 4 times a day at 8am, 12 noon, 4pm, and 8pm.   Use the drops every day until bottles are empty. Vigamox (tan top) Put 1 drop in at 8am, 12 noon, 4pm, and 8pm.    Pred Acetate (pink top) Put 1 drop in at 8:10am, 12:10pm, 4:10pm, and 8:10pm. Shake before using. Ketorolac/Diclofenac Put 1 drop in at 8:20am, 12:20pm, 4:20pm, 8:20pm.    CONTINUE DROPS UNTIL ALL BOTTLES ARE EMPTY! Follow-up appointment tomorrow at Dr. Eloy Moctezuma office:________8:30 am____________    Please call the office at [phone] if you experience severe pain or nausea.      The following personal items collected during your admission are returned to you: Dental Appliance: Dental Appliances: None  Vision: Visual Aid: Glasses  Hearing Aid: @FLOW  DISCHARGE SUMMARY from Nurse  ([phone]:LAST)@  Jewelry: Jewelry: None  Clothing: Clothing: With patient  Other Valuables: Other Valuables: None  Valuables sent to safe:        PATIENT INSTRUCTIONS:    After General Anesthesia or Intravenous Sedation, for 24 hours or while taking prescription Narcotics:        Someone should be with you for the next 24 hours. For your own safety, a responsible adult must drive you home. · Limit your activities  · Recommended activity: Rest today, up with assistance today. Do not climb stairs or shower unattended for the next 24 hours. · Please start with a soft bland diet and advance as tolerated (no nausea) to regular diet. · If you have a sore throat you should try the following: fluids, warm salt water gargles, or throat lozenges. If it does not improve after several days please follow up with your primary physician. · Do not drive and operate hazardous machinery  · Do not make important personal or business decisions  · Do  not drink alcoholic beverages  · If you have not urinated within 8 hours after discharge, please contact your surgeon on call. Report the following to your surgeon:  · Excessive pain, swelling, redness or odor of or around the surgical area  · Temperature over 100.5  · Any nausea or vomiting. · You will receive a Post Operative Call from one of the Recovery Room Nurses on the day after your surgery to check on you. It is very important for us to know how you are recovering after your surgery. If you have an issue or need to speak with someone, please call your surgeon, do not wait for the post operative call. · You may receive an e-mail or letter in the mail from [facility] regarding your experience with us in the Ambulatory Surgery Unit. Your feedback is valuable to us and we appreciate your participation in the survey.        · If the above instructions are not adequate or you are having problems after your surgery, call the physician at their office number. · We wish you a speedy recovery ? What to do at Home:      *  Please give a list of your current medications to your Primary Care Provider. *  Please update this list whenever your medications are discontinued, doses are      changed, or new medications (including over-the-counter products) are added. *  Please carry medication information at all times in case of emergency situations. If you have not received your influenza and/or pneumococcal vaccine, please follow up with your primary care physician. The discharge information has been reviewed with the patient and family. The patient and family verbalized understanding. TO PREVENT AN INFECTION      1. [address] YOUR HANDS     To prevent infection, good handwashing is the most important thing you or your caregiver can do.  Wash your hands with soap and water or use the hand  we gave you before you touch any wounds. 2.  USE CLEAN SHEETS     Use freshly cleaned sheets on your bed after surgery.  To keep the surgery site clean, do not allow pets to sleep with you while your wound is still healing. 3. STOP SMOKING    Stop smoking, or at least cut back on smoking     Smoking slows your healing. 4.  CONTROL YOUR BLOOD SUGAR     High blood sugars slow wound healing.  If you are diabetic, control your blood sugar levels before and after your surgery. Learning About Coronavirus [phone])  Coronavirus [phone]): Overview  What is coronavirus (COVID-19)? The coronavirus disease (COVID-19) is caused by a virus. It is an illness that was first found in [place], [place], in December 2019. It has since spread worldwide. The virus can cause fever, cough, and trouble breathing. In severe cases, it can cause pneumonia and make it hard to breathe without help.  It can cause death.  Coronaviruses are a large group of viruses. They cause the common cold. They also cause more serious illnesses like Middle East respiratory syndrome (MERS) and severe acute respiratory syndrome (SARS). COVID-19 is caused by a novel coronavirus. That means it's a new type that has not been seen in people before. This virus spreads person-to-person through droplets from coughing and sneezing. It can also spread when you are close to someone who is infected. And it can spread when you touch something that has the virus on it, such as a doorknob or a tabletop. What can you do to protect yourself from coronavirus (COVID-19)? The best way to protect yourself from getting sick is to:  · Avoid areas where there is an outbreak. · Avoid contact with people who may be infected. · Wash your hands often with soap or alcohol-based hand sanitizers. · Avoid crowds and try to stay at least 6 feet away from other people. · Wash your hands often, especially after you cough or sneeze. Use soap and water, and scrub for at least 20 seconds. If soap and water aren't available, use an alcohol-based hand . · Avoid touching your mouth, nose, and eyes. What can you do to avoid spreading the virus to others? To help avoid spreading the virus to others:  · Cover your mouth with a tissue when you cough or sneeze. Then throw the tissue in the trash. · Use a disinfectant to clean things that you touch often. · Wear a cloth face cover if you have to go to public areas. · Stay home if you are sick or have been exposed to the virus. Don't go to school, work, or public areas. And don't use public transportation, ride-shares, or taxis unless you have no choice. · If you are sick:  ? Leave your home only if you need to get medical care. But call the doctor's office first so they know you're coming. And wear a face cover. ? Wear the face cover whenever you're around other people.  It can help stop the spread of the virus when you cough or sneeze. ? Clean and disinfect your home every day. Use household  and disinfectant wipes or sprays. Take special care to clean things that you grab with your hands. These include doorknobs, remote controls, phones, and handles on your refrigerator and microwave. And don't forget countertops, tabletops, bathrooms, and computer keyboards. When to call for help  [id number] anytime you think you may need emergency care. For example, call if:  · You have severe trouble breathing. (You can't talk at all.)  · You have constant chest pain or pressure. · You are severely dizzy or lightheaded. · You are confused or can't think clearly. · Your face and lips have a blue color. · You pass out (lose consciousness) or are very hard to wake up. Call your doctor now if you develop symptoms such as:  · Shortness of breath. · Fever. · Cough. If you need to get care, call ahead to the doctor's office for instructions before you go. Make sure you wear a face cover to prevent exposing other people to the virus. Where can you get the latest information? The following health organizations are tracking and studying this virus. Their websites contain the most up-to-date information. Samy Dawson also learn what to do if you think you may have been exposed to the virus. · U.S. Centers for Disease Control and Prevention (CDC): The CDC provides updated news about the disease and travel advice. The website also tells you how to prevent the spread of infection. www.cdc.gov  · World Health Organization [facility]): WHO offers information about the virus outbreaks. WHO also has travel advice. www.who.int  Current as of: May 8, 2020               Content Version: 12.5  © 4541-3535 Healthwise, Incorporated. Care instructions adapted under license by [employer] (which disclaims liability or warranty for this information).  If you have questions about a medical condition or this instruction, always ask your healthcare professional. [address] any warranty or liability for your use of this information.

## 2020-08-05 NOTE — ANESTHESIA PREPROCEDURE EVALUATION
Anesthetic History     PONV (only one time with laproscopic surgery, no probs with colonoscopies)          Review of Systems / Medical History  Patient summary reviewed, nursing notes reviewed and pertinent labs reviewed    Pulmonary  Within defined limits                 Neuro/Psych         Psychiatric history (depression)     Cardiovascular                  Exercise tolerance: >4 METS  Comments: 07/20 ECHO= normal    0720 Stress test = normal   GI/Hepatic/Renal  Within defined limits              Endo/Other    Diabetes: type 2    Cancer (skin)     Other Findings   Comments: Cataracts         Physical Exam    Airway  Mallampati: II  TM Distance: 4 - 6 cm  Neck ROM: normal range of motion   Mouth opening: Normal     Cardiovascular    Rhythm: regular  Rate: normal         Dental    Dentition: Caps/crowns     Pulmonary  Breath sounds clear to auscultation               Abdominal  GI exam deferred       Other Findings            Anesthetic Plan    ASA: 2  Anesthesia type: MAC          Induction: Intravenous  Anesthetic plan and risks discussed with: Patient      preop glucose 218

## 2020-08-08 ENCOUNTER — HOSPITAL ENCOUNTER (OUTPATIENT)
Dept: PREADMISSION TESTING | Age: 75
Discharge: HOME | End: 2020-08-08
Payer: MEDICARE

## 2020-08-08 PROCEDURE — 87635 SARS-COV-2 COVID-19 AMP PRB: CPT

## 2020-08-09 LAB
HEALTH STATUS, XMCV2T: NORMAL
SARS-COV-2, COV2NT: NOT DETECTED
SOURCE, COVRS: NORMAL
SPECIMEN SOURCE, FCOV2M: NORMAL
SPECIMEN TYPE, XMCV1T: NORMAL

## 2020-08-10 RX ORDER — MOXIFLOXACIN 5 MG/ML
1 SOLUTION/ DROPS OPHTHALMIC 3 TIMES DAILY
COMMUNITY
End: 2022-04-11

## 2020-08-10 RX ORDER — DICLOFENAC SODIUM 1 MG/ML
1 SOLUTION/ DROPS OPHTHALMIC 4 TIMES DAILY
COMMUNITY
End: 2020-09-16

## 2020-08-10 NOTE — PERIOP NOTES
[facility]  Ambulatory Surgery Unit  Pre-operative Instructions    Surgery/Procedure Date  Wednesday, August 12, 2020            Tentative Arrival Time TBD      1. On the day of your surgery/procedure, please report to the Ambulatory Surgery Unit Registration Desk and sign in at your designated time. The Ambulatory Surgery Unit is located in [facility] on the [facility] side of the [facility] across from the [address] [place] [address]. Please have all of your health insurance cards and a photo ID. 2. You must have someone with you to drive you home, as you should not drive a car for 24 hours following anesthesia. Please make arrangements for a responsible adult friend or family member to stay with you for at least the first 24 hours after your surgery. 3. Do not have anything to eat or drink (including water, gum, mints, coffee, juice) after 11:59 PM, Tuesday. This may not apply to medications prescribed by your physician. (Please note below the special instructions with medications to take the morning of surgery, if applicable.)    4. We recommend you do not drink any alcoholic beverages for 24 hours before and after your surgery. 5. Contact your surgeons office for instructions on the following medications: non-steroidal anti-inflammatory drugs (i.e. Advil, Aleve), vitamins, and supplements. (Some surgeons will want you to stop these medications prior to surgery and others may allow you to take them)   **If you are currently taking Plavix, Coumadin, Aspirin and/or other blood-thinning agents, contact your surgeon for instructions. ** Your surgeon will partner with the physician prescribing these medications to determine if it is safe to stop or if you need to continue taking. Please do not stop taking these medications without instructions from your surgeon.     6. In an effort to help prevent surgical site infection, we ask that you shower with an anti-bacterial soap (i.e. Dial/Safeguard, or the soap provided to you at your preadmission testing appointment) for 3 days prior to and on the morning of surgery, using a fresh towel after each shower. (Please begin this process with fresh bed linens.) Do not apply any lotions, powders, or deodorants after the shower on the day of your procedure. If applicable, please do not shave the operative site for 48 hours prior to surgery. 7. Wear comfortable clothes. Wear glasses instead of contacts. Do not bring any jewelry or money (other than copays or fees as instructed). Do not wear make-up, particularly mascara, the morning of your surgery. Do not wear nail polish, particularly if you are having foot /hand surgery. Wear your hair loose or down, no ponytails, buns, dominick pins or clips. All body piercings must be removed. 8. You should understand that if you do not follow these instructions your surgery may be cancelled. If your physical condition changes (i.e. fever, cold or flu) please contact your surgeon as soon as possible. 9. It is important that you be on time. If a situation occurs where you may be late, or if you have any questions or problems, please call [phone].    10. Your surgery time may be subject to change. You will receive a phone call the day prior to surgery to confirm your arrival time. 11. Pediatric patients: please bring a change of clothes, diapers, bottle/sippy cup, pacifier, etc.      Special Instructions: Take all medications and inhalers, as prescribed, on the morning of surgery with a sip of water EXCEPT:  No diabetic medications      Insulin Dependent Diabetic patients: Take your diabetic medications as prescribed the day before surgery. Hold all diabetic medications the day of surgery. If you are scheduled to arrive for surgery after 8:00 AM, and your AM blood sugar is >200, please call Ambulatory Surgery. I understand a pre-operative phone call will be made to verify my surgery time. In the event that I am not available, I give permission for a message to be left on my answering service and/or with another person?       yes    Preop instructions reviewed  Pt verbalized understanding.      ___________________      ___________________      ________________  (Signature of Patient)          (Witness)                   (Date and Time)

## 2020-08-11 ENCOUNTER — ANESTHESIA EVENT (OUTPATIENT)
Dept: SURGERY | Age: 75
End: 2020-08-11
Payer: MEDICARE

## 2020-08-11 PROBLEM — H25.811 COMBINED FORMS OF AGE-RELATED CATARACT OF RIGHT EYE: Status: ACTIVE | Noted: 2020-08-11

## 2020-08-11 PROBLEM — H25.812 COMBINED FORMS OF AGE-RELATED CATARACT OF LEFT EYE: Status: RESOLVED | Noted: 2020-08-01 | Resolved: 2020-08-11

## 2020-08-12 ENCOUNTER — ANESTHESIA (OUTPATIENT)
Dept: SURGERY | Age: 75
End: 2020-08-12
Payer: MEDICARE

## 2020-08-12 ENCOUNTER — HOSPITAL ENCOUNTER (OUTPATIENT)
Age: 75
Setting detail: OUTPATIENT SURGERY
Discharge: HOME | End: 2020-08-12
Attending: OPHTHALMOLOGY | Admitting: OPHTHALMOLOGY
Payer: MEDICARE

## 2020-08-12 VITALS
SYSTOLIC BLOOD PRESSURE: 120 MMHG | OXYGEN SATURATION: 98 % | WEIGHT: 149 LBS | DIASTOLIC BLOOD PRESSURE: 51 MMHG | TEMPERATURE: 97.9 F | HEART RATE: 62 BPM | HEIGHT: 66 IN | BODY MASS INDEX: 23.95 KG/M2 | RESPIRATION RATE: 18 BRPM

## 2020-08-12 LAB
GLUCOSE BLD STRIP.AUTO-MCNC: 119 MG/DL (ref 65–100)
GLUCOSE BLD STRIP.AUTO-MCNC: 95 MG/DL (ref 65–100)
SERVICE CMNT-IMP: ABNORMAL
SERVICE CMNT-IMP: NORMAL

## 2020-08-12 PROCEDURE — 74011250636 HC RX REV CODE- 250/636: Performed by: OPHTHALMOLOGY

## 2020-08-12 PROCEDURE — 76210000046 HC AMBSU PH II REC FIRST 0.5 HR: Performed by: OPHTHALMOLOGY

## 2020-08-12 PROCEDURE — 74011000250 HC RX REV CODE- 250

## 2020-08-12 PROCEDURE — 74011250636 HC RX REV CODE- 250/636: Performed by: NURSE ANESTHETIST, CERTIFIED REGISTERED

## 2020-08-12 PROCEDURE — 77030021352 HC CBL LD SYS DISP COVD -B: Performed by: OPHTHALMOLOGY

## 2020-08-12 PROCEDURE — 76210000040 HC AMBSU PH I REC FIRST 0.5 HR: Performed by: OPHTHALMOLOGY

## 2020-08-12 PROCEDURE — 76030000000 HC AMB SURG OR TIME 0.5 TO 1: Performed by: OPHTHALMOLOGY

## 2020-08-12 PROCEDURE — 76060000061 HC AMB SURG ANES 0.5 TO 1 HR: Performed by: OPHTHALMOLOGY

## 2020-08-12 PROCEDURE — 74011000250 HC RX REV CODE- 250: Performed by: OPHTHALMOLOGY

## 2020-08-12 PROCEDURE — 77030018846 HC SOL IRR STRL H20 ICUM -A: Performed by: OPHTHALMOLOGY

## 2020-08-12 PROCEDURE — 82962 GLUCOSE BLOOD TEST: CPT

## 2020-08-12 PROCEDURE — 74011250637 HC RX REV CODE- 250/637: Performed by: OPHTHALMOLOGY

## 2020-08-12 PROCEDURE — V2632 POST CHMBR INTRAOCULAR LENS: HCPCS | Performed by: OPHTHALMOLOGY

## 2020-08-12 DEVICE — LENS IOL POST 1-PC 6X13 20.5 -- ACRYSOF: Type: IMPLANTABLE DEVICE | Site: EYE | Status: FUNCTIONAL

## 2020-08-12 RX ORDER — SODIUM CHLORIDE 0.9 % (FLUSH) 0.9 %
5-40 SYRINGE (ML) INJECTION AS NEEDED
Status: DISCONTINUED | OUTPATIENT
Start: 2020-08-12 | End: 2020-08-12 | Stop reason: HOSPADM

## 2020-08-12 RX ORDER — FENTANYL CITRATE 50 UG/ML
25 INJECTION, SOLUTION INTRAMUSCULAR; INTRAVENOUS
Status: DISCONTINUED | OUTPATIENT
Start: 2020-08-12 | End: 2020-08-12 | Stop reason: HOSPADM

## 2020-08-12 RX ORDER — KETOROLAC TROMETHAMINE 5 MG/ML
1 SOLUTION OPHTHALMIC ONCE
Status: COMPLETED | OUTPATIENT
Start: 2020-08-12 | End: 2020-08-12

## 2020-08-12 RX ORDER — ONDANSETRON 2 MG/ML
4 INJECTION INTRAMUSCULAR; INTRAVENOUS AS NEEDED
Status: DISCONTINUED | OUTPATIENT
Start: 2020-08-12 | End: 2020-08-12 | Stop reason: HOSPADM

## 2020-08-12 RX ORDER — KETOROLAC TROMETHAMINE 5 MG/ML
SOLUTION OPHTHALMIC
Status: COMPLETED
Start: 2020-08-12 | End: 2020-08-12

## 2020-08-12 RX ORDER — SODIUM CHLORIDE 0.9 % (FLUSH) 0.9 %
5-40 SYRINGE (ML) INJECTION EVERY 8 HOURS
Status: DISCONTINUED | OUTPATIENT
Start: 2020-08-12 | End: 2020-08-12 | Stop reason: HOSPADM

## 2020-08-12 RX ORDER — SODIUM CHLORIDE 9 MG/ML
25 INJECTION, SOLUTION INTRAVENOUS CONTINUOUS
Status: DISCONTINUED | OUTPATIENT
Start: 2020-08-12 | End: 2020-08-12 | Stop reason: HOSPADM

## 2020-08-12 RX ORDER — DIPHENHYDRAMINE HYDROCHLORIDE 50 MG/ML
12.5 INJECTION, SOLUTION INTRAMUSCULAR; INTRAVENOUS AS NEEDED
Status: DISCONTINUED | OUTPATIENT
Start: 2020-08-12 | End: 2020-08-12 | Stop reason: HOSPADM

## 2020-08-12 RX ORDER — ERYTHROMYCIN 5 MG/G
OINTMENT OPHTHALMIC ONCE
Status: COMPLETED | OUTPATIENT
Start: 2020-08-12 | End: 2020-08-12

## 2020-08-12 RX ORDER — PREDNISOLONE ACETATE 10 MG/ML
1 SUSPENSION/ DROPS OPHTHALMIC 2 TIMES DAILY
Status: DISCONTINUED | OUTPATIENT
Start: 2020-08-12 | End: 2020-08-12 | Stop reason: HOSPADM

## 2020-08-12 RX ORDER — PROPARACAINE HYDROCHLORIDE 5 MG/ML
1 SOLUTION/ DROPS OPHTHALMIC ONCE
Status: COMPLETED | OUTPATIENT
Start: 2020-08-12 | End: 2020-08-12

## 2020-08-12 RX ORDER — MIDAZOLAM HYDROCHLORIDE 1 MG/ML
INJECTION, SOLUTION INTRAMUSCULAR; INTRAVENOUS AS NEEDED
Status: DISCONTINUED | OUTPATIENT
Start: 2020-08-12 | End: 2020-08-12 | Stop reason: HOSPADM

## 2020-08-12 RX ORDER — LIDOCAINE HYDROCHLORIDE 10 MG/ML
0.1 INJECTION, SOLUTION EPIDURAL; INFILTRATION; INTRACAUDAL; PERINEURAL AS NEEDED
Status: DISCONTINUED | OUTPATIENT
Start: 2020-08-12 | End: 2020-08-12 | Stop reason: HOSPADM

## 2020-08-12 RX ORDER — TROPICAMIDE 10 MG/ML
SOLUTION/ DROPS OPHTHALMIC
Status: COMPLETED
Start: 2020-08-12 | End: 2020-08-12

## 2020-08-12 RX ORDER — TETRACAINE HYDROCHLORIDE 5 MG/ML
SOLUTION OPHTHALMIC AS NEEDED
Status: DISCONTINUED | OUTPATIENT
Start: 2020-08-12 | End: 2020-08-12 | Stop reason: HOSPADM

## 2020-08-12 RX ORDER — SODIUM CHLORIDE, SODIUM LACTATE, POTASSIUM CHLORIDE, CALCIUM CHLORIDE 600; 310; 30; 20 MG/100ML; MG/100ML; MG/100ML; MG/100ML
25 INJECTION, SOLUTION INTRAVENOUS CONTINUOUS
Status: DISCONTINUED | OUTPATIENT
Start: 2020-08-12 | End: 2020-08-12 | Stop reason: HOSPADM

## 2020-08-12 RX ORDER — TROPICAMIDE 10 MG/ML
1 SOLUTION/ DROPS OPHTHALMIC ONCE
Status: COMPLETED | OUTPATIENT
Start: 2020-08-12 | End: 2020-08-12

## 2020-08-12 RX ORDER — LIDOCAINE HYDROCHLORIDE 40 MG/ML
3 INJECTION, SOLUTION RETROBULBAR; TOPICAL ONCE
Status: COMPLETED | OUTPATIENT
Start: 2020-08-12 | End: 2020-08-12

## 2020-08-12 RX ORDER — POVIDONE-IODINE 10 %
SOLUTION, NON-ORAL TOPICAL
Status: COMPLETED | OUTPATIENT
Start: 2020-08-12 | End: 2020-08-12

## 2020-08-12 RX ORDER — TETRACAINE HYDROCHLORIDE 5 MG/ML
1 SOLUTION OPHTHALMIC
Status: ACTIVE | OUTPATIENT
Start: 2020-08-12 | End: 2020-08-12

## 2020-08-12 RX ADMIN — MIDAZOLAM HYDROCHLORIDE 0.5 MG: 1 INJECTION, SOLUTION INTRAMUSCULAR; INTRAVENOUS at 08:05

## 2020-08-12 RX ADMIN — KETOROLAC TROMETHAMINE 1 DROP: 5 SOLUTION OPHTHALMIC at 06:35

## 2020-08-12 RX ADMIN — SODIUM CHLORIDE 25 ML/HR: 900 INJECTION, SOLUTION INTRAVENOUS at 06:41

## 2020-08-12 RX ADMIN — SODIUM CHLORIDE: 900 INJECTION, SOLUTION INTRAVENOUS at 07:45

## 2020-08-12 RX ADMIN — Medication 3 AMPULE: at 06:43

## 2020-08-12 RX ADMIN — KETOROLAC TROMETHAMINE 1 DROP: 5 SOLUTION/ DROPS OPHTHALMIC at 06:35

## 2020-08-12 RX ADMIN — PROPARACAINE HYDROCHLORIDE 1 DROP: 5 SOLUTION/ DROPS OPHTHALMIC at 06:35

## 2020-08-12 RX ADMIN — TROPICAMIDE 1 DROP: 10 SOLUTION/ DROPS OPHTHALMIC at 06:35

## 2020-08-12 RX ADMIN — MIDAZOLAM HYDROCHLORIDE 1 MG: 1 INJECTION, SOLUTION INTRAMUSCULAR; INTRAVENOUS at 08:06

## 2020-08-12 RX ADMIN — MIDAZOLAM HYDROCHLORIDE 0.5 MG: 1 INJECTION, SOLUTION INTRAMUSCULAR; INTRAVENOUS at 07:45

## 2020-08-12 NOTE — ANESTHESIA PREPROCEDURE EVALUATION
Anesthetic History     PONV (only one time with laproscopic surgery, no probs with colonoscopies)          Review of Systems / Medical History  Patient summary reviewed and pertinent labs reviewed    Pulmonary  Within defined limits                 Neuro/Psych         Psychiatric history (depression)     Cardiovascular                  Exercise tolerance: >4 METS  Comments: 07/20 ECHO= normal    0720 Stress test = normal   GI/Hepatic/Renal  Within defined limits              Endo/Other    Diabetes: type 2    Cancer (skin)     Other Findings   Comments: Cataracts     Anesthetic History     PONV (only one time with laproscopic surgery, no probs with colonoscopies)          Review of Systems / Medical History  Patient summary reviewed, nursing notes reviewed and pertinent labs reviewed    Pulmonary  Within defined limits                 Neuro/Psych         Psychiatric history (depression)     Cardiovascular                  Exercise tolerance: >4 METS  Comments: 07/20 ECHO= normal    0720 Stress test = normal   GI/Hepatic/Renal  Within defined limits              Endo/Other    Diabetes: type 2    Cancer (skin)     Other Findings   Comments: Cataracts         Physical Exam    Airway  Mallampati: II  TM Distance: 4 - 6 cm  Neck ROM: normal range of motion   Mouth opening: Normal     Cardiovascular    Rhythm: regular  Rate: normal         Dental    Dentition: Caps/crowns     Pulmonary  Breath sounds clear to auscultation               Abdominal  GI exam deferred       Other Findings            Anesthetic Plan    ASA: 2  Anesthesia type: MAC          Induction: Intravenous  Anesthetic plan and risks discussed with: Patient      preop glucose 218        Physical Exam    Airway  Mallampati: II  TM Distance: 4 - 6 cm  Neck ROM: normal range of motion   Mouth opening: Normal     Cardiovascular    Rhythm: regular  Rate: normal         Dental    Dentition: Caps/crowns     Pulmonary  Breath sounds clear to auscultation               Abdominal  GI exam deferred       Other Findings            Anesthetic Plan    ASA: 2  Anesthesia type: MAC          Induction: Intravenous  Anesthetic plan and risks discussed with: Patient      preop glucose

## 2020-08-12 NOTE — OP NOTES
Name: Kala Syed MASC-4        updated 3/1/2013  Description:  Rose Crane with intraocular lens implant    PREOPERATIVE DIAGNOSIS: Visually impairing combined cataract, Right eye. POSTOPERATIVE DIAGNOSIS: Visually impairing combined   cataract,Right eye. OPERATION: Procedure(s):  RIGHT EYE SECOND REFRACTIVE CATARACT REMOVAL WITH LENS IMPLANTATION    ANESTHESIA: Topical with intravenous sedation    TYPE OF LENS IMPLANT USED:   Implant Name Type Inv. Item Serial No.  Lot No. LRB No. Used Action   IOL ASPHERIC 6.0 FAITH +20.5 - [id number] Other IOL ASPHERIC 6.0 FAITH +20.5 [id number] [employer] N/A Right 1 Implanted       PHACO TIME:   52.3 seconds at an average power of 14.3%. Estimated blood loss: None    Complications:None    Specimen removed: None    DESCRIPTION OF PROCEDURE:  The patient was brought to the holding area where an IV was begun at a  keep open rate. The patient was connected to cardiovascular monitoring. The patient received 1 drop of mydriacyl 1% and proparacaine 0.5%. The patient was then brought back to the operating suite and cardiovascular monitoring was reestablished. The patient was  prepped and draped in the usual manner for ocular surgery. The patient received 1 drop of Proparacaine followed by 2 drops of 5% Betadine solution in the inferior conjunctival cul-de-sac The operating microscope was brought into position and 4% Xylocaine drops were instilled onto the eye. The lid speculum was set into position. A paracentesis was created and Sugarcane solution was instilled into the anterior chamber for adequate anesthesia and pupillary dilation. Viscoelastic was instilled into the anterior chamber. The 2.4 mm keratome was then utilized to create a clear corneal incision, and a circular capsulorrhexis was performed. BSS was utilized to perform careful hydrodissection of the lens.  Viscoelastic was then instilled into the anterior chamber to protect the corneal endothelium. Phacoemulsification was then carried out. Any remaining cortical material was removed in irrigation/aspiration mode. Viscoelastic was then instilled into the capsular bag. The lens implant was inspected for any defects. After finding none, the lens was placed in its injector and inserted into the capsular bag. The lens implant was centered on the patient's visual/astigmatic axis. The viscoelastic was then removed from the eye. Miochol was instilled posterior to the iris plane to facilitate pupillary miosis. The wound was checked for any leaks, after finding none, Iopidine and Pred Forte drops were instilled into the inferior cul-de-sac, and erythromycin ointment was placed over the cornea. A semi-pressure dressing and shield were then placed for the patient. The patient tolerated the procedure very well, and was brought to the recovery room in an alert and stable and satisfactory postoperative condition. Instructions were given to the patient and their family for their immediate postoperative care.   [address] [place] [address],   8/12/2020

## 2020-08-12 NOTE — PERIOP NOTES
Tc Pizarroaurelia  [birth year]  [id number]    Situation:  Verbal report given from: A. [address] [facility] and [facility] Gallego RN  Procedure: Procedure(s):  RIGHT EYE SECOND REFRACTIVE CATARACT REMOVAL WITH LENS IMPLANTATION    Background:    Preoperative diagnosis: Combined forms of age-related cataract of right eye [H25.811]    Postoperative diagnosis: Combined forms of age-related cataract of right eye [H25.811]    :  Dr. Mehreen Roa    Assistant(s): Circ-1: Syeda Hough RN  Scrub Tech-1: Veronica ELLISON    Specimens: * No specimens in log *    Assessment:  Intra-procedure medications         Anesthesia gave intra-procedure sedation and medications, see anesthesia flow sheet     Intravenous fluids: LR@ KVO     Vital signs stable       Recommendation:    Permission to share finding with friend Demond Acuna

## 2020-08-12 NOTE — ANESTHESIA POSTPROCEDURE EVALUATION
Procedure(s):  RIGHT EYE SECOND REFRACTIVE CATARACT REMOVAL WITH LENS IMPLANTATION. MAC    Anesthesia Post Evaluation        Patient location during evaluation: PACU  Note status: Adequate. Level of consciousness: responsive to verbal stimuli and sleepy but conscious  Pain management: satisfactory to patient  Airway patency: patent  Anesthetic complications: no  Cardiovascular status: acceptable  Respiratory status: acceptable  Hydration status: acceptable  Comments: +Post-Anesthesia Evaluation and Assessment    Patient: Mason Tejeda MRN: [id number]  SSN: [id number]   YOB: 1945  Age: 66 y.o. Sex: female      Cardiovascular Function/Vital Signs    /51 (BP 1 Location: Right arm, BP Patient Position: At rest)   Pulse 62   Temp 36.6 °C (97.9 °F)   Resp 18   Ht 5' 6\" (1.676 m)   Wt 67.6 kg (149 lb)   SpO2 98%   BMI 24.05 kg/m²     Patient is status post Procedure(s):  RIGHT EYE SECOND REFRACTIVE CATARACT REMOVAL WITH LENS IMPLANTATION. Nausea/Vomiting: Controlled. Postoperative hydration reviewed and adequate. Pain:  Pain Scale 1: Numeric (0 - 10) (08/12/20 0845)  Pain Intensity 1: 0 (08/12/20 0845)   Managed. Neurological Status:   Neuro (WDL): Within Defined Limits (08/12/20 0819)   At baseline. Mental Status and Level of Consciousness: Arousable. Pulmonary Status:   O2 Device: Room air (08/12/20 0819)   Adequate oxygenation and airway patent. Complications related to anesthesia: None    Post-anesthesia assessment completed. No concerns. Signed By: Katy Magana MD    8/12/2020  Post anesthesia nausea and vomiting:  controlled      INITIAL Post-op Vital signs:   Vitals Value Taken Time   /65 8/12/2020  8:36 AM   Temp 36.6 °C (97.9 °F) 8/12/2020  8:36 AM   Pulse 61 8/12/2020  8:43 AM   Resp 23 8/12/2020  8:43 AM   SpO2 99 % 8/12/2020  8:43 AM   Vitals shown include unvalidated device data.

## 2020-08-12 NOTE — DISCHARGE INSTRUCTIONS
Velma Thapa D.O., P.CClay  [facility] [address].  [phone]    Post-Operative Instructions for Cataract Surgery     Remove your eye shield and bandage at 12 noon the same day as surgery and start your eye drops. THROW AWAY THE GAUZE UNDER THE SHIELD.  Place the blue eye shield back on for one week while asleep, even if napping in the recliner. Use the tape included in your bag.  DO NOT RUB YOUR EYE EVER! DO NOT WIPE YOUR EYE! ONLY WIPE ON YOUR CHEEK!                DO NOT WEAR EYE MAKEUP FOR 1 WEEK!  You can shower starting tomorrow.  You may resume your previous diet.  If you use glaucoma drops in the operative eye, continue them as directed.  Common symptoms after surgery include a scratchy feeling, slight headache, red eye, and/or blurred vision. You may use Advil or Tylenol for any discomfort.  Avoid strenuous activities and driving until you see Dr. Kala Syed tomorrow. Please use care when walking, your depth perception may be altered.  Bring your bag with your drops to your follow up appointment. Below are Instructions On How To Use Your Eye Drops. ON THE DAY OF SURGERY:     Use all three eye drops at 12 noon, 4pm, and 8pm.  Wait 10 minutes between drops. THE DAY AFTER SURGERY:     You will use the drops 4 times a day at 8am, 12 noon, 4pm, and 8pm.   Use the drops every day until bottles are empty. Vigamox (tan top) Put 1 drop in at 8am, 12 noon, 4pm, and 8pm.    Pred Acetate (pink top) Put 1 drop in at 8:10am, 12:10pm, 4:10pm, and 8:10pm. Shake before using. Ketorolac/Diclofenac Put 1 drop in at 8:20am, 12:20pm, 4:20pm, 8:20pm.    CONTINUE DROPS UNTIL ALL BOTTLES ARE EMPTY! Follow-up appointment tomorrow at Dr. Walter Lawson office:____8:30am________________    Please call the office at [phone] if you experience severe pain or nausea.      The following personal items collected during your admission are returned to you: Dental Appliance: Dental Appliances: None  Vision: Visual Aid: Glasses(left with family in waiting room)  Hearing Aid: @FLOW  DISCHARGE SUMMARY from Nurse  (1341:LAST)@  Jewelry: Jewelry: None  Clothing: Clothing: With patient, Shirt, Pants, Footwear  Other Valuables: Other Valuables: Eyeglasses(given to friend in waiting room)  Valuables sent to safe:        PATIENT INSTRUCTIONS:    After General Anesthesia or Intravenous Sedation, for 24 hours or while taking prescription Narcotics:        Someone should be with you for the next 24 hours. For your own safety, a responsible adult must drive you home. · Limit your activities  · Recommended activity: Rest today, up with assistance today. Do not climb stairs or shower unattended for the next 24 hours. · Please start with a soft bland diet and advance as tolerated (no nausea) to regular diet. · If you have a sore throat you should try the following: fluids, warm salt water gargles, or throat lozenges. If it does not improve after several days please follow up with your primary physician. · Do not drive and operate hazardous machinery  · Do not make important personal or business decisions  · Do  not drink alcoholic beverages  · If you have not urinated within 8 hours after discharge, please contact your surgeon on call. Report the following to your surgeon:  · Excessive pain, swelling, redness or odor of or around the surgical area  · Temperature over 100.5  · Any nausea or vomiting. · You will receive a Post Operative Call from one of the Recovery Room Nurses on the day after your surgery to check on you. It is very important for us to know how you are recovering after your surgery. If you have an issue or need to speak with someone, please call your surgeon, do not wait for the post operative call. · You may receive an e-mail or letter in the mail from [place] regarding your experience with us in the Ambulatory Surgery Unit.  Your feedback is valuable to us and we appreciate your participation in the survey. · If the above instructions are not adequate or you are having problems after your surgery, call the physician at their office number. · We wish you a speedy recovery ? What to do at Home:      *  Please give a list of your current medications to your Primary Care Provider. *  Please update this list whenever your medications are discontinued, doses are      changed, or new medications (including over-the-counter products) are added. *  Please carry medication information at all times in case of emergency situations. If you have not received your influenza and/or pneumococcal vaccine, please follow up with your primary care physician. The discharge information has been reviewed with the patient and family. The patient and family verbalized understanding. TO PREVENT AN INFECTION      1. [address] YOUR HANDS     To prevent infection, good handwashing is the most important thing you or your caregiver can do.  Wash your hands with soap and water or use the hand  we gave you before you touch any wounds. 2.  USE CLEAN SHEETS     Use freshly cleaned sheets on your bed after surgery.  To keep the surgery site clean, do not allow pets to sleep with you while your wound is still healing. 3. STOP SMOKING    Stop smoking, or at least cut back on smoking     Smoking slows your healing. 4.  CONTROL YOUR BLOOD SUGAR     High blood sugars slow wound healing.  If you are diabetic, control your blood sugar levels before and after your surgery.

## 2020-08-12 NOTE — PERIOP NOTES
Permission received to review discharge instructions and discuss private health information with friend Ruma Garcia. Patient states that friend will be with them for at least 24 hours following today's procedure.

## 2020-08-12 NOTE — PERIOP NOTES
Patient: Norma Drew MRN: [id number]  SSN: [id number]   YOB: 1945  Age: 66 y.o. Sex: female     Patient is status post Procedure(s):  RIGHT EYE SECOND REFRACTIVE CATARACT REMOVAL WITH LENS IMPLANTATION. Surgeon(s) and Role:     * May Stanley, DO - Primary    Local/Dose/Irrigation:  SEE MAR                  Peripheral IV 08/12/20 Left Hand (Active)   Site Assessment Clean, dry, & intact 08/12/20 0641   Phlebitis Assessment 0 08/12/20 0641   Infiltration Assessment 0 08/12/20 0641   Dressing Status Clean, dry, & intact 08/12/20 0641   Dressing Type Tape;Transparent 08/12/20 0641   Hub Color/Line Status Blue; Infusing 08/12/20 1407                           Dressing/Packing:  Wound Eye Right-Dressing Type: Eye patch; Eye shield; Other (Comment)(SECURED WITH TAPE BY MD) (08/12/20 0500)

## 2020-08-28 DIAGNOSIS — Z79.4 TYPE 2 DIABETES MELLITUS WITHOUT COMPLICATION, WITH LONG-TERM CURRENT USE OF INSULIN (HCC): ICD-10-CM

## 2020-08-28 DIAGNOSIS — E11.9 TYPE 2 DIABETES MELLITUS WITHOUT COMPLICATION, WITH LONG-TERM CURRENT USE OF INSULIN (HCC): ICD-10-CM

## 2020-08-28 RX ORDER — METFORMIN HYDROCHLORIDE 500 MG/1
TABLET ORAL
Qty: 360 TAB | Refills: 3 | Status: SHIPPED | OUTPATIENT
Start: 2020-08-28 | End: 2021-08-17

## 2020-09-05 LAB
ALBUMIN SERPL-MCNC: 4.4 G/DL (ref 3.7–4.7)
ALBUMIN/GLOB SERPL: 2.1 {RATIO} (ref 1.2–2.2)
ALP SERPL-CCNC: 53 IU/L (ref 39–117)
ALT SERPL-CCNC: 11 IU/L (ref 0–32)
AST SERPL-CCNC: 18 IU/L (ref 0–40)
BILIRUB SERPL-MCNC: 0.4 MG/DL (ref 0–1.2)
BUN SERPL-MCNC: 17 MG/DL (ref 8–27)
BUN/CREAT SERPL: 19 (ref 12–28)
CALCIUM SERPL-MCNC: 9.7 MG/DL (ref 8.7–10.3)
CHLORIDE SERPL-SCNC: 104 MMOL/L (ref 96–106)
CHOLEST SERPL-MCNC: 160 MG/DL (ref 100–199)
CO2 SERPL-SCNC: 24 MMOL/L (ref 20–29)
CREAT SERPL-MCNC: 0.89 MG/DL (ref 0.57–1)
EST. AVERAGE GLUCOSE BLD GHB EST-MCNC: 146 MG/DL
GLOBULIN SER CALC-MCNC: 2.1 G/DL (ref 1.5–4.5)
GLUCOSE SERPL-MCNC: 73 MG/DL (ref 65–99)
HBA1C MFR BLD: 6.7 % (ref 4.8–5.6)
HDLC SERPL-MCNC: 72 MG/DL
LDLC SERPL CALC-MCNC: 76 MG/DL (ref 0–99)
POTASSIUM SERPL-SCNC: 4.3 MMOL/L (ref 3.5–5.2)
PROT SERPL-MCNC: 6.5 G/DL (ref 6–8.5)
SODIUM SERPL-SCNC: 142 MMOL/L (ref 134–144)
TRIGL SERPL-MCNC: 59 MG/DL (ref 0–149)
VLDLC SERPL CALC-MCNC: 12 MG/DL (ref 5–40)

## 2020-09-14 NOTE — TELEPHONE ENCOUNTER
[facility] on file sent a fax requesting a refill of   Requested Prescriptions     Pending Prescriptions Disp Refills    glucose blood VI test strips (OneTouch Ultra Blue Test Strip) strip 300 Strip 3

## 2020-09-14 NOTE — TELEPHONE ENCOUNTER
PCP: Poornima Milian MD     Last appt: Visit date not found   Future Appointments   Date Time Provider Segundo Ortizisti   9/16/2020  9:45 AM Poornima Milian MD [facility]   10/6/2020  7:40 AM [facility] 3 [facility]        Requested Prescriptions     Pending Prescriptions Disp Refills    glucose blood VI test strips (OneTouch Ultra Blue Test Strip) strip 300 Strip 3

## 2020-09-16 ENCOUNTER — OFFICE VISIT (OUTPATIENT)
Dept: INTERNAL MEDICINE CLINIC | Age: 75
End: 2020-09-16
Payer: MEDICARE

## 2020-09-16 VITALS
RESPIRATION RATE: 14 BRPM | SYSTOLIC BLOOD PRESSURE: 129 MMHG | HEART RATE: 82 BPM | TEMPERATURE: 97.6 F | OXYGEN SATURATION: 98 % | WEIGHT: 150.2 LBS | DIASTOLIC BLOOD PRESSURE: 75 MMHG | HEIGHT: 66 IN | BODY MASS INDEX: 24.14 KG/M2

## 2020-09-16 DIAGNOSIS — E78.2 MIXED HYPERLIPIDEMIA: ICD-10-CM

## 2020-09-16 DIAGNOSIS — Z00.00 MEDICARE ANNUAL WELLNESS VISIT, SUBSEQUENT: Primary | ICD-10-CM

## 2020-09-16 DIAGNOSIS — E11.9 TYPE 2 DIABETES MELLITUS WITHOUT COMPLICATION, WITH LONG-TERM CURRENT USE OF INSULIN (HCC): ICD-10-CM

## 2020-09-16 DIAGNOSIS — Z79.4 TYPE 2 DIABETES MELLITUS WITHOUT COMPLICATION, WITH LONG-TERM CURRENT USE OF INSULIN (HCC): ICD-10-CM

## 2020-09-16 DIAGNOSIS — M25.552 HIP PAIN, LEFT: ICD-10-CM

## 2020-09-16 PROCEDURE — G8432 DEP SCR NOT DOC, RNG: HCPCS | Performed by: INTERNAL MEDICINE

## 2020-09-16 PROCEDURE — G0439 PPPS, SUBSEQ VISIT: HCPCS | Performed by: INTERNAL MEDICINE

## 2020-09-16 PROCEDURE — 1090F PRES/ABSN URINE INCON ASSESS: CPT | Performed by: INTERNAL MEDICINE

## 2020-09-16 PROCEDURE — 99214 OFFICE O/P EST MOD 30 MIN: CPT | Performed by: INTERNAL MEDICINE

## 2020-09-16 PROCEDURE — G8420 CALC BMI NORM PARAMETERS: HCPCS | Performed by: INTERNAL MEDICINE

## 2020-09-16 PROCEDURE — 1101F PT FALLS ASSESS-DOCD LE1/YR: CPT | Performed by: INTERNAL MEDICINE

## 2020-09-16 PROCEDURE — G8536 NO DOC ELDER MAL SCRN: HCPCS | Performed by: INTERNAL MEDICINE

## 2020-09-16 PROCEDURE — G8427 DOCREV CUR MEDS BY ELIG CLIN: HCPCS | Performed by: INTERNAL MEDICINE

## 2020-09-16 NOTE — PROGRESS NOTES
Carolyn Cowan  Identified pt with two pt identifiers(name and ). Chief Complaint   Patient presents with    Blood Pressure Check     Room 2A //     Cholesterol Problem    Diabetes    Annual Wellness Visit    Hip Pain     Discussed during virtual visit       Reviewed record In preparation for visit and have obtained necessary documentation. 1. Have you been to the ER, urgent care clinic or hospitalized since your last visit? No     2. Have you seen or consulted any other health care providers outside of the [address] [place] [address] since your last visit? Include any pap smears or colon screening. Yes. Cardiologist - Dr. Emil Washburn     Patient has an advance directive. Vitals reviewed with provider.     Health Maintenance reviewed:     Health Maintenance Due   Topic    Pneumococcal 65+ years (2 of 2 - PPSV23)    Flu Vaccine (1)    Foot Exam Q1     MICROALBUMIN Q1     Medicare Yearly Exam           Wt Readings from Last 3 Encounters:   20 150 lb 3.2 oz (68.1 kg)   20 149 lb (67.6 kg)   20 149 lb 9.6 oz (67.9 kg)        Temp Readings from Last 3 Encounters:   20 97.6 °F (36.4 °C) (Oral)   20 97.9 °F (36.6 °C)   20 97.7 °F (36.5 °C)        BP Readings from Last 3 Encounters:   20 129/75   20 120/51   20 138/62        Pulse Readings from Last 3 Encounters:   20 82   20 62   20 62        Vitals:    20 0929   BP: 129/75   Pulse: 82   Resp: 14   Temp: 97.6 °F (36.4 °C)   TempSrc: Oral   SpO2: 98%   Weight: 150 lb 3.2 oz (68.1 kg)   Height: 5' 6\" (1.676 m)   PainSc:   1   PainLoc: Hip          Learning Assessment:   :       Learning Assessment 2016   PRIMARY LEARNER Patient Patient   HIGHEST LEVEL OF EDUCATION - PRIMARY LEARNER  > 4 YEARS OF COLLEGE -   BARRIERS PRIMARY LEARNER NONE -   CO-LEARNER CAREGIVER No -   PRIMARY LANGUAGE ENGLISH ENGLISH   LEARNER PREFERENCE PRIMARY READING DEMONSTRATION ANSWERED BY Patient patient   RELATIONSHIP SELF SELF        Depression Screening:   :       3 most recent PHQ Screens 7/10/2020   Little interest or pleasure in doing things Not at all   Feeling down, depressed, irritable, or hopeless Not at all   Total Score PHQ 2 0        Fall Risk Assessment:   :       Fall Risk Assessment, last 12 mths 3/11/2020   Able to walk? Yes   Fall in past 12 months? No        Abuse Screening:   :       Abuse Screening Questionnaire 7/2/2020 9/5/2019 8/20/2018 8/24/2016 2/10/2015   Do you ever feel afraid of your partner? N N N N N   Are you in a relationship with someone who physically or mentally threatens you? N N N N N   Is it safe for you to go home?  Y Y Y Y Y        ADL Screening:   :       ADL Assessment 8/20/2018   Feeding yourself No Help Needed   Getting from bed to chair No Help Needed   Getting dressed No Help Needed   Bathing or showering No Help Needed   Walk across the room (includes cane/walker) No Help Needed   Using the telphone No Help Needed   Taking your medications No Help Needed   Preparing meals No Help Needed   Managing money (expenses/bills) No Help Needed   Moderately strenuous housework (laundry) No Help Needed   Shopping for personal items (toiletries/medicines) No Help Needed   Shopping for groceries No Help Needed   Driving No Help Needed   Climbing a flight of stairs No Help Needed   Getting to places beyond walking distances No Help Needed

## 2020-09-16 NOTE — PATIENT INSTRUCTIONS
Medicare Wellness Visit, Female The best way to live healthy is to have a lifestyle where you eat a well-balanced diet, exercise regularly, limit alcohol use, and quit all forms of tobacco/nicotine, if applicable. Regular preventive services are another way to keep healthy. Preventive services (vaccines, screening tests, monitoring & exams) can help personalize your care plan, which helps you manage your own care. Screening tests can find health problems at the earliest stages, when they are easiest to treat. [facility] follows the current, evidence-based guidelines published by the [facility] Rashid Rico ([facility]) when recommending preventive services for our patients. Because we follow these guidelines, sometimes recommendations change over time as research supports it. (For example, mammograms used to be recommended annually. Even though Medicare will still pay for an annual mammogram, the newer guidelines recommend a mammogram every two years for women of average risk). Of course, you and your doctor may decide to screen more often for some diseases, based on your risk and your co-morbidities (chronic disease you are already diagnosed with). Preventive services for you include: - Medicare offers their members a free annual wellness visit, which is time for you and your primary care provider to discuss and plan for your preventive service needs. Take advantage of this benefit every year! 
-All adults over the age of 72 should receive the recommended pneumonia vaccines. Current USPSTF guidelines recommend a series of two vaccines for the best pneumonia protection.  
-All adults should have a flu vaccine yearly and a tetanus vaccine every 10 years.  
-All adults age 48 and older should receive the shingles vaccines (series of two vaccines). -All adults age 38-68 who are overweight should have a diabetes screening test once every three years. -All adults born between [birth year] and 1965 should be screened once for Hepatitis C. 
-Other screening tests and preventive services for persons with diabetes include: an eye exam to screen for diabetic retinopathy, a kidney function test, a foot exam, and stricter control over your cholesterol.  
-Cardiovascular screening for adults with routine risk involves an electrocardiogram (ECG) at intervals determined by your doctor.  
-Colorectal cancer screenings should be done for adults age 54-65 with no increased risk factors for colorectal cancer. There are a number of acceptable methods of screening for this type of cancer. Each test has its own benefits and drawbacks. Discuss with your doctor what is most appropriate for you during your annual wellness visit. The different tests include: colonoscopy (considered the best screening method), a fecal occult blood test, a fecal DNA test, and sigmoidoscopy. 
 
-A bone mass density test is recommended when a woman turns 65 to screen for osteoporosis. This test is only recommended one time, as a screening. Some providers will use this same test as a disease monitoring tool if you already have osteoporosis. -Breast cancer screenings are recommended every other year for women of normal risk, age 54-69. 
-Cervical cancer screenings for women over age 72 are only recommended with certain risk factors. Here is a list of your current Health Maintenance items (your personalized list of preventive services) with a due date: 
Health Maintenance Due Topic Date Due  Pneumococcal Vaccine (2 of 2 - PPSV23) 09/04/2016 [address] [place] [address] Diabetic Foot Care  09/05/2020  Albumin Urine Test  09/05/2020 [address] [place] [address] Annual Well Visit  09/05/2020

## 2020-09-16 NOTE — PROGRESS NOTES
HPI  Ms. Maricruz Sharma is a 66y.o. year old female, she is seen today for follow up HTN, high cholesterol, DM, AWV and hip pain. Had virtual visit with me on 7/21 for left hip pain, I added meloxicam and rec seeing ortho if not improved. meloxican helps pain, low back pain. Continues to have some left hip pain, improved with taking meloxicam about 1 hour prior to going for a walk. No pain now, pain worse while walking and afterwards. Better with rest.     DM - a1c improved at 6.7 - hasn't been eating out as much. Says glucose has been  in morning. No low glucose levels. HTN - no chest pain, sob, dizziness, weakness    Cholesterol - at goal        Chief Complaint   Patient presents with    Blood Pressure Check     Room 2A //     Cholesterol Problem    Diabetes    Annual Wellness Visit    Hip Pain     Discussed during virtual visit        Prior to Admission medications    Medication Sig Start Date End Date Taking? Authorizing Provider   glucose blood VI test strips (OneTouch Ultra Blue Test Strip) strip USE TO TEST BLOOD SUGAR THREE TIMES A DAY 9/15/20  Yes Naif Pollock MD   metFORMIN (GLUCOPHAGE) 500 mg tablet TAKE 2 TABLETS BY MOUTH EVERY MORNING AND TAKE 1 TABLET BY MOUTH AT NOON AND DINNERTIME 8/28/20  Yes Naif Pollock MD   moxifloxacin (Vigamox) 0.5 % ophthalmic solution Administer 1 Drop to right eye three (3) times daily. Yes Provider, Historical   losartan (COZAAR) 50 mg tablet TAKE 1 TABLET BY MOUTH EVERY DAY 7/30/20  Yes Naif Pollock MD   meloxicam [facility]) 7.5 mg tablet Take 1-2 Tabs by mouth daily as needed for Pain. 7/21/20  Yes Naif Pollock MD   aspirin delayed-release 81 mg tablet Take 1 Tab by mouth daily.  7/2/20  Yes Kristian Amador NP   insulin glargine (Lantus U-100 Insulin) 100 unit/mL injection INJECT 14 UNITS EVERY MORNING AND 3 UNITS EVERY EVENING FOR DIABETES CONTROL 3/30/20  Yes Naif Pollock MD   lancets (OneTouch UltraSoft Lancets) misc USE TO TEST BLOOD SUGAR THREE TIMES A DAY 3/17/20  Yes Moises Lopez MD   spironolactone (ALDACTONE) 25 mg tablet Take 1 Tab by mouth daily. 3/11/20  Yes Moises Lopez MD   Insulin Syringe-Needle U-100 (BD INSULIN SYRINGE ULTRA-FINE) 0.3 mL 31 gauge x 5/16\" syrg USE AS DIRECTED TWICE DAILY 11/14/19  Yes Moises Lopez MD   Calcium Carbonate-Vit D3-Min (CALTRATE 600+D PLUS MINERALS) 600 mg (1,500 mg)-400 unit Chew Take  by mouth daily. 6/27/12  Yes Cliffton Duane., MD   multivitamins-minerals-lutein (CENTRUM SILVER) Tab Take  by mouth. Yes Provider, Historical   cholecalciferol, vitamin d3, (VITAMIN D) 1,000 unit tablet Take  by mouth daily. Yes Provider, Historical   diclofenac (VOLTAREN) 0.1 % ophthalmic solution Administer 1 Drop to left eye four (4) times daily. 9/16/20  Provider, Historical   prednisoLONE acetate (PRED FORTE) 1 % ophthalmic suspension Administer 1 Drop to left eye daily. 7/1/20 9/16/20  Provider, Historical   moxifloxacin (VIGAMOX) 0.5 % ophthalmic solution Administer 1 Drop to left eye daily. 7/1/20 9/16/20  Provider, Historical         Allergies   Allergen Reactions    Lisinopril Cough    Simvastatin Myalgia         REVIEW OF SYSTEMS:  Per HPI    PHYSICAL EXAM:  Visit Vitals  /75 (BP 1 Location: Left arm, BP Patient Position: Sitting)   Pulse 82   Temp 97.6 °F (36.4 °C) (Oral)   Resp 14   Ht 5' 6\" (1.676 m)   Wt 150 lb 3.2 oz (68.1 kg)   LMP  (LMP Unknown)   SpO2 98%   BMI 24.24 kg/m²     Constitutional: Appears well-developed and well-nourished. No distress. HENT:   Head: Normocephalic and atraumatic. Eyes: No scleral icterus. Neck: no lad, no tm, supple   Cardiovascular: Normal S1/S2, regular rhythm. No murmurs, rubs, or gallops. Pulmonary/Chest: Effort normal and breath sounds normal. No respiratory distress. No wheezes, rhonchi, or rales. Abdomen: Soft, NT/ND, +BS, no rebound or guarding, no masses, no HSM appreciated.    Back: no pain on palpation  Left hip: no pain no palpation, +pain in inguinal region with internal and external rotation  Ext: No edema. Neurological: Alert. Strength 5/5 b/l LE, SLR neg b/l  Psychiatric: Normal mood and affect. Behavior is normal.    DM foot exam: 2+ pedal pulses, no lesions, sensation intact to monofilament b/l but decreased on left     Lab Results   Component Value Date/Time    Sodium 142 09/04/2020 07:49 AM    Potassium 4.3 09/04/2020 07:49 AM    Chloride 104 09/04/2020 07:49 AM    CO2 24 09/04/2020 07:49 AM    Anion gap 9 11/01/2010 08:07 AM    Glucose 73 09/04/2020 07:49 AM    BUN 17 09/04/2020 07:49 AM    Creatinine 0.89 09/04/2020 07:49 AM    BUN/Creatinine ratio 19 09/04/2020 07:49 AM    GFR est AA 72 09/04/2020 07:49 AM    GFR est non-AA 63 09/04/2020 07:49 AM    Calcium 9.7 09/04/2020 07:49 AM    Bilirubin, total 0.4 09/04/2020 07:49 AM    Alk. phosphatase 53 09/04/2020 07:49 AM    Protein, total 6.5 09/04/2020 07:49 AM    Albumin 4.4 09/04/2020 07:49 AM    Globulin 2.4 11/01/2010 08:07 AM    A-G Ratio 2.1 09/04/2020 07:49 AM    ALT (SGPT) 11 09/04/2020 07:49 AM     Lab Results   Component Value Date/Time    Hemoglobin A1c 6.7 (H) 09/04/2020 07:49 AM    Hemoglobin A1c 7.2 (H) 02/26/2020 07:49 AM    Hemoglobin A1c 7.6 (H) 02/06/2019 08:36 AM      Lab Results   Component Value Date/Time    Cholesterol, total 160 09/04/2020 07:49 AM    HDL Cholesterol 72 09/04/2020 07:49 AM    LDL, calculated 84 02/26/2020 07:49 AM    LDL Chol Calc (NIH) 76 09/04/2020 07:49 AM    VLDL, calculated 16 02/26/2020 07:49 AM    VLDL Cholesterol Allen 12 09/04/2020 07:49 AM    Triglyceride 59 09/04/2020 07:49 AM    CHOL/HDL Ratio 1.5 11/01/2010 08:07 AM          ASSESSMENT/PLAN  Diagnoses and all orders for this visit:    1. Medicare annual wellness visit, subsequent    2.  Type 2 diabetes mellitus without complication, with long-term current use of insulin ([facility])  -      DIABETES FOOT EXAM  -     AMB POC URINE, MICROALBUMIN, SEMIQUANTITATIVE  -     METABOLIC PANEL, COMPREHENSIVE; Future  -     HEMOGLOBIN A1C WITH EAG; Future  At goal - continue current medications   3. Mixed hyperlipidemia  -     LIPID PANEL; Future  At goal - continue current medications   4. Hip pain, left  -     REFERRAL TO ORTHOPEDICS  Suspect OA - continue meloxicam prn and refer to ortho as pain has been impacting mobility        Health Maintenance Due   Topic Date Due    Pneumococcal 65+ years (2 of 2 - PPSV23) 09/04/2016    Foot Exam Q1  09/05/2020    MICROALBUMIN Q1  09/05/2020    Medicare Yearly Exam  09/05/2020        Follow-up and Dispositions    · Return in about 6 months (around 3/16/2021) for bp, chol, dm, labs prior. Reviewed plan of care. Patient has provided input and agrees with goals. The nurse provided the patient and/or family with advanced directive information if needed and encouraged the patient to provide a copy to the office when available. This is the Subsequent Medicare Annual Wellness Exam, performed 12 months or more after the Initial AWV or the last Subsequent AWV    I have reviewed the patient's medical history in detail and updated the computerized patient record.      History     Patient Active Problem List   Diagnosis Code    Diabetes mellitus ([address] E11.9    Colon polyps K63.5    Compression fracture of thoracic vertebra (HCC) S22.000A    Right hip pain M25.551    Hyperlipidemia E78.5    Tinea unguium B35.1    Skin cancer C44.90    Hip pain, left M25.552    Osteoporosis M81.0    Dense breast R92.2    Combined forms of age-related cataract of right eye H25.811     Past Medical History:   Diagnosis Date    Abnormal EKG 07/14/2020    pt getting workup with Dr. Christiano Tejeda Arthritis     lower back    Cancer [facility])     skin     Depression     mild with occasional use of prozac    Diabetes ([facility]     Nausea & vomiting     Psychiatric disorder     depression      Past Surgical History:   Procedure Laterality Date    COLONOSCOPY N/A 11/16/2016    COLONOSCOPY performed by Se Amanda MD at [address]  11/16/2016         HX BREAST BIOPSY Left     benign cyst removed    HX CATARACT REMOVAL Left 08/2020    HX ORTHOPAEDIC      right hip arthroscopy - Dr. Viet Mills HX EDMUNDO AND BSO  1992     paps  wnl fibroids    HX TONSILLECTOMY      HX VEIN STRIPPING Right     vein removed x1    [place] COLSC FLX W/REMOVAL LESION BY HOT BX FORCEPS  8/22/2011          Current Outpatient Medications   Medication Sig Dispense Refill    glucose blood VI test strips (OneTouch Ultra Blue Test Strip) strip USE TO TEST BLOOD SUGAR THREE TIMES A  Strip 3    metFORMIN (GLUCOPHAGE) 500 mg tablet TAKE 2 TABLETS BY MOUTH EVERY MORNING AND TAKE 1 TABLET BY MOUTH AT NOON AND DINNERTIME 360 Tab 3    moxifloxacin (Vigamox) 0.5 % ophthalmic solution Administer 1 Drop to right eye three (3) times daily.  losartan (COZAAR) 50 mg tablet TAKE 1 TABLET BY MOUTH EVERY DAY 90 Tab 3    meloxicam (MOBIC) 7.5 mg tablet Take 1-2 Tabs by mouth daily as needed for Pain. 30 Tab 1    aspirin delayed-release 81 mg tablet Take 1 Tab by mouth daily. 30 Tab 3    insulin glargine (Lantus U-100 Insulin) 100 unit/mL injection INJECT 14 UNITS EVERY MORNING AND 3 UNITS EVERY EVENING FOR DIABETES CONTROL 10 mL 10    lancets (OneTouch UltraSoft Lancets) misc USE TO TEST BLOOD SUGAR THREE TIMES A  Each 5    spironolactone (ALDACTONE) 25 mg tablet Take 1 Tab by mouth daily. 90 Tab 3    Insulin Syringe-Needle U-100 (BD INSULIN SYRINGE ULTRA-FINE) 0.3 mL 31 gauge x 5/16\" syrg USE AS DIRECTED TWICE DAILY 100 Syringe 5    Calcium Carbonate-Vit D3-Min (CALTRATE 600+D PLUS MINERALS) 600 mg (1,500 mg)-400 unit Chew Take  by mouth daily.  multivitamins-minerals-lutein (CENTRUM SILVER) Tab Take  by mouth.  cholecalciferol, vitamin d3, (VITAMIN D) 1,000 unit tablet Take  by mouth daily. Allergies   Allergen Reactions    Lisinopril Cough    Simvastatin Myalgia       Family History   Problem Relation Age of Onset    Hypertension Brother      Social History     Tobacco Use    Smoking status: Never Smoker    Smokeless tobacco: Never Used   Substance Use Topics    Alcohol use: Yes     Alcohol/week: 3.0 standard drinks     Types: 3 Glasses of wine per week       Depression Risk Factor Screening:     3 most recent PHQ Screens 7/10/2020   Little interest or pleasure in doing things Not at all   Feeling down, depressed, irritable, or hopeless Not at all   Total Score PHQ 2 0       Alcohol Risk Screen   Do you average more than 1 drink per night or more than 7 drinks a week:  No    On any one occasion in the past three months have you have had more than 3 drinks containing alcohol:  No        Functional Ability and Level of Safety:   Hearing: Hearing is good. Activities of Daily Living: The home contains: handrails and grab bars  Patient does total self care     Ambulation: with no difficulty     Fall Risk:  Fall Risk Assessment, last 12 mths 3/11/2020   Able to walk? Yes   Fall in past 12 months? No     Abuse Screen:  Patient is not abused       Cognitive Screening   Has your family/caregiver stated any concerns about your memory: no     Cognitive Screening: N/A    Patient Care Team   Patient Care Team:  Chantel Ramos MD as PCP - General (Internal Medicine)  Chantel Ramos MD as PCP - REHABILITATION [facility] Empaneled Provider    Assessment/Plan   Education and counseling provided:  Are appropriate based on today's review and evaluation    Diagnoses and all orders for this visit:    1. Medicare annual wellness visit, subsequent    2. Type 2 diabetes mellitus without complication, with long-term current use of insulin ([facility])  -      DIABETES FOOT EXAM  -     AMB POC URINE, MICROALBUMIN, SEMIQUANTITATIVE  -     METABOLIC PANEL, COMPREHENSIVE; Future  -     HEMOGLOBIN A1C WITH EAG; Future    3.  Mixed hyperlipidemia  -     LIPID PANEL; Future    4.  Hip pain, left  -     REFERRAL TO ORTHOPEDICS        Health Maintenance Due   Topic Date Due    Pneumococcal 65+ years (2 of 2 - PPSV23) 09/04/2016    Foot Exam Q1  09/05/2020    MICROALBUMIN Q1  09/05/2020    Medicare Yearly Exam  09/05/2020

## 2020-09-17 LAB
ALBUMIN/CREAT UR: 3 MG/G CREAT (ref 0–29)
CREAT UR-MCNC: 132.2 MG/DL
MICROALBUMIN UR-MCNC: 4.4 UG/ML

## 2020-09-21 DIAGNOSIS — E11.9 TYPE 2 DIABETES MELLITUS WITHOUT COMPLICATION, WITH LONG-TERM CURRENT USE OF INSULIN (HCC): ICD-10-CM

## 2020-09-21 DIAGNOSIS — Z79.4 TYPE 2 DIABETES MELLITUS WITHOUT COMPLICATION, WITH LONG-TERM CURRENT USE OF INSULIN (HCC): ICD-10-CM

## 2020-09-21 RX ORDER — CALCIUM CARB/VITAMIN D3/VIT K1 500-100-40
TABLET,CHEWABLE ORAL
Qty: 100 SYRINGE | Refills: 5 | Status: SHIPPED | OUTPATIENT
Start: 2020-09-21 | End: 2021-07-19 | Stop reason: SDUPTHER

## 2020-09-21 NOTE — TELEPHONE ENCOUNTER
[facility] on file sent a fax requesting a refill of   Requested Prescriptions     Pending Prescriptions Disp Refills    Insulin Syringe-Needle U-100 (BD Insulin Syringe Ultra-Fine) 0.3 mL 31 gauge x 5/16\" syrg 100 Syringe 5     Sig: USE AS DIRECTED TWICE DAILY

## 2020-09-21 NOTE — TELEPHONE ENCOUNTER
PCP: Jose Luis Garcia MD     Last appt: 9/16/2020   Future Appointments   Date Time Provider Segundo Wood   10/6/2020  7:40 AM [facility] 3 [facility]   4/5/2021  8:30 AM Jose Luis Garcia MD [facility] BS AMB        Requested Prescriptions     Pending Prescriptions Disp Refills    Insulin Syringe-Needle U-100 (BD Insulin Syringe Ultra-Fine) 0.3 mL 31 gauge x 5/16\" syrg 100 Syringe 5     Sig: USE AS DIRECTED TWICE DAILY

## 2020-10-06 ENCOUNTER — HOSPITAL ENCOUNTER (OUTPATIENT)
Dept: MAMMOGRAPHY | Age: 75
Discharge: HOME | End: 2020-10-06
Attending: INTERNAL MEDICINE
Payer: MEDICARE

## 2020-10-06 DIAGNOSIS — Z12.31 VISIT FOR SCREENING MAMMOGRAM: ICD-10-CM

## 2020-10-06 PROCEDURE — 77063 BREAST TOMOSYNTHESIS BI: CPT

## 2020-11-16 DIAGNOSIS — M25.552 LEFT HIP PAIN: ICD-10-CM

## 2020-11-16 RX ORDER — MELOXICAM 7.5 MG/1
7.5-15 TABLET ORAL
Qty: 30 TAB | Refills: 1 | Status: SHIPPED | OUTPATIENT
Start: 2020-11-16 | End: 2021-07-19 | Stop reason: SDUPTHER

## 2020-11-16 NOTE — TELEPHONE ENCOUNTER
PCP: Vira Barber MD     Last appt: 9/16/2020   Future Appointments   Date Time Provider Segundo Wood   4/5/2021  8:30 AM Vira Barber MD [facility] BS AMB        Requested Prescriptions     Pending Prescriptions Disp Refills    meloxicam (MOBIC) 7.5 mg tablet 30 Tab 1     Sig: Take 1-2 Tabs by mouth daily as needed for Pain.

## 2021-02-08 DIAGNOSIS — Z79.4 TYPE 2 DIABETES MELLITUS WITHOUT COMPLICATION, WITH LONG-TERM CURRENT USE OF INSULIN (HCC): ICD-10-CM

## 2021-02-08 DIAGNOSIS — E11.9 TYPE 2 DIABETES MELLITUS WITHOUT COMPLICATION, WITH LONG-TERM CURRENT USE OF INSULIN (HCC): ICD-10-CM

## 2021-02-08 RX ORDER — INSULIN GLARGINE 100 [IU]/ML
INJECTION, SOLUTION SUBCUTANEOUS
Qty: 10 ML | Refills: 10 | Status: SHIPPED | OUTPATIENT
Start: 2021-02-08 | End: 2022-01-09

## 2021-02-08 NOTE — TELEPHONE ENCOUNTER
PCP: Estelle Washington MD     Last appt: 9/16/2020   Future Appointments   Date Time Provider Segundo Wood   4/12/2021  8:45 AM Estelle Washington MD [facility] AMB        Requested Prescriptions     Pending Prescriptions Disp Refills    insulin glargine (Lantus U-100 Insulin) 100 unit/mL injection 10 mL 10     Sig: INJECT 14 UNITS EVERY MORNING AND 3 UNITS EVERY EVENING FOR DIABETES CONTROL

## 2021-02-08 NOTE — TELEPHONE ENCOUNTER
Requested Prescriptions     Pending Prescriptions Disp Refills    insulin glargine (Lantus U-100 Insulin) 100 unit/mL injection 10 mL 10     Sig: INJECT 14 UNITS EVERY MORNING AND 3 UNITS EVERY EVENING FOR DIABETES CONTROL

## 2021-03-04 DIAGNOSIS — L65.9 ALOPECIA: ICD-10-CM

## 2021-03-04 RX ORDER — SPIRONOLACTONE 25 MG/1
25 TABLET ORAL DAILY
Qty: 90 TAB | Refills: 3 | Status: SHIPPED | OUTPATIENT
Start: 2021-03-04 | End: 2022-01-11 | Stop reason: SDUPTHER

## 2021-03-04 NOTE — TELEPHONE ENCOUNTER
[facility] on file sent a fax requesting a refill of   Requested Prescriptions     Pending Prescriptions Disp Refills    spironolactone (ALDACTONE) 25 mg tablet 90 Tab 3     Sig: Take 1 Tab by mouth daily.

## 2021-03-04 NOTE — TELEPHONE ENCOUNTER
REFILL     PCP: Ami Ortiz MD     Last appt: 9/16/2020   Future Appointments   Date Time Provider Segundo Wood   4/12/2021  8:45 AM Ami Ortiz MD [facility] BS AMB        Requested Prescriptions     Pending Prescriptions Disp Refills    spironolactone (ALDACTONE) 25 mg tablet 90 Tab 3     Sig: Take 1 Tab by mouth daily.

## 2021-03-17 RX ORDER — LANCETS
EACH MISCELLANEOUS
Qty: 300 EACH | Refills: 5 | Status: SHIPPED | OUTPATIENT
Start: 2021-03-17 | End: 2022-01-07 | Stop reason: SDUPTHER

## 2021-03-17 NOTE — TELEPHONE ENCOUNTER
[facility] on file sent a fax requesting a refill of   Requested Prescriptions     Pending Prescriptions Disp Refills    lancets (OneTouch UltraSoft Lancets) misc 300 Each 5     Sig: USE TO TEST BLOOD SUGAR THREE TIMES A DAY

## 2021-03-17 NOTE — TELEPHONE ENCOUNTER
PCP: Funmi Macias MD    Last appt: 9/16/2020  Future Appointments   Date Time Provider Segundo Wood   4/12/2021  8:45 AM Funmi Macias MD [facility] AMB       Requested Prescriptions     Pending Prescriptions Disp Refills    lancets (OneTouch UltraSoft Lancets) misc 300 Each 5     Sig: USE TO TEST BLOOD SUGAR THREE TIMES A DAY

## 2021-03-30 ENCOUNTER — TELEPHONE (OUTPATIENT)
Dept: INTERNAL MEDICINE CLINIC | Age: 76
End: 2021-03-30

## 2021-03-30 DIAGNOSIS — E11.9 TYPE 2 DIABETES MELLITUS WITHOUT COMPLICATION, WITH LONG-TERM CURRENT USE OF INSULIN (HCC): Primary | ICD-10-CM

## 2021-03-30 DIAGNOSIS — Z79.4 TYPE 2 DIABETES MELLITUS WITHOUT COMPLICATION, WITH LONG-TERM CURRENT USE OF INSULIN (HCC): Primary | ICD-10-CM

## 2021-03-30 DIAGNOSIS — E78.2 MIXED HYPERLIPIDEMIA: ICD-10-CM

## 2021-03-30 NOTE — TELEPHONE ENCOUNTER
Pt called and stated that the lab orders she has from her last appt stated they  on 3/16/21. When looking in pt chart I do not see any orders.  Please update with lab orders she has appt on  at 8am. The orders just need to be faxed to the Lab [facility] across from [facility]

## 2021-04-03 LAB
ALBUMIN SERPL-MCNC: 4.4 G/DL (ref 3.7–4.7)
ALBUMIN/GLOB SERPL: 1.9 {RATIO} (ref 1.2–2.2)
ALP SERPL-CCNC: 59 IU/L (ref 39–117)
ALT SERPL-CCNC: 13 IU/L (ref 0–32)
AST SERPL-CCNC: 21 IU/L (ref 0–40)
BILIRUB SERPL-MCNC: 0.4 MG/DL (ref 0–1.2)
BUN SERPL-MCNC: 17 MG/DL (ref 8–27)
BUN/CREAT SERPL: 22 (ref 12–28)
CALCIUM SERPL-MCNC: 9.6 MG/DL (ref 8.7–10.3)
CHLORIDE SERPL-SCNC: 100 MMOL/L (ref 96–106)
CHOLEST SERPL-MCNC: 175 MG/DL (ref 100–199)
CO2 SERPL-SCNC: 24 MMOL/L (ref 20–29)
CREAT SERPL-MCNC: 0.79 MG/DL (ref 0.57–1)
EST. AVERAGE GLUCOSE BLD GHB EST-MCNC: 151 MG/DL
GLOBULIN SER CALC-MCNC: 2.3 G/DL (ref 1.5–4.5)
GLUCOSE SERPL-MCNC: 120 MG/DL (ref 65–99)
HBA1C MFR BLD: 6.9 % (ref 4.8–5.6)
HDLC SERPL-MCNC: 75 MG/DL
LDLC SERPL CALC-MCNC: 89 MG/DL (ref 0–99)
POTASSIUM SERPL-SCNC: 3.8 MMOL/L (ref 3.5–5.2)
PROT SERPL-MCNC: 6.7 G/DL (ref 6–8.5)
SODIUM SERPL-SCNC: 140 MMOL/L (ref 134–144)
TRIGL SERPL-MCNC: 57 MG/DL (ref 0–149)
VLDLC SERPL CALC-MCNC: 11 MG/DL (ref 5–40)

## 2021-04-12 ENCOUNTER — OFFICE VISIT (OUTPATIENT)
Dept: INTERNAL MEDICINE CLINIC | Age: 76
End: 2021-04-12
Payer: MEDICARE

## 2021-04-12 VITALS
OXYGEN SATURATION: 97 % | SYSTOLIC BLOOD PRESSURE: 120 MMHG | HEART RATE: 72 BPM | HEIGHT: 66 IN | DIASTOLIC BLOOD PRESSURE: 70 MMHG | RESPIRATION RATE: 16 BRPM | BODY MASS INDEX: 23.95 KG/M2 | WEIGHT: 149 LBS | TEMPERATURE: 97.8 F

## 2021-04-12 DIAGNOSIS — I10 ESSENTIAL HYPERTENSION: ICD-10-CM

## 2021-04-12 DIAGNOSIS — E78.2 MIXED HYPERLIPIDEMIA: ICD-10-CM

## 2021-04-12 DIAGNOSIS — E11.9 TYPE 2 DIABETES MELLITUS WITHOUT COMPLICATION, WITH LONG-TERM CURRENT USE OF INSULIN (HCC): Primary | ICD-10-CM

## 2021-04-12 DIAGNOSIS — Z79.4 TYPE 2 DIABETES MELLITUS WITHOUT COMPLICATION, WITH LONG-TERM CURRENT USE OF INSULIN (HCC): Primary | ICD-10-CM

## 2021-04-12 PROCEDURE — G8420 CALC BMI NORM PARAMETERS: HCPCS | Performed by: INTERNAL MEDICINE

## 2021-04-12 PROCEDURE — 1101F PT FALLS ASSESS-DOCD LE1/YR: CPT | Performed by: INTERNAL MEDICINE

## 2021-04-12 PROCEDURE — G8754 DIAS BP LESS 90: HCPCS | Performed by: INTERNAL MEDICINE

## 2021-04-12 PROCEDURE — 99214 OFFICE O/P EST MOD 30 MIN: CPT | Performed by: INTERNAL MEDICINE

## 2021-04-12 PROCEDURE — G8427 DOCREV CUR MEDS BY ELIG CLIN: HCPCS | Performed by: INTERNAL MEDICINE

## 2021-04-12 PROCEDURE — G8536 NO DOC ELDER MAL SCRN: HCPCS | Performed by: INTERNAL MEDICINE

## 2021-04-12 PROCEDURE — G8752 SYS BP LESS 140: HCPCS | Performed by: INTERNAL MEDICINE

## 2021-04-12 PROCEDURE — G8510 SCR DEP NEG, NO PLAN REQD: HCPCS | Performed by: INTERNAL MEDICINE

## 2021-04-12 PROCEDURE — 1090F PRES/ABSN URINE INCON ASSESS: CPT | Performed by: INTERNAL MEDICINE

## 2021-04-12 NOTE — PROGRESS NOTES
Carolyn Cowan  Identified pt with two pt identifiers(name and ). Chief Complaint   Patient presents with    Hypertension    Choking    Diabetes       Reviewed record In preparation for visit and have obtained necessary documentation. 1. Have you been to the ER, urgent care clinic or hospitalized since your last visit? No     2. Have you seen or consulted any other health care providers outside of the [address] [place] [address] since your last visit? Include any pap smears or colon screening. No    Patient has an advance directive. Vitals reviewed with provider.     Health Maintenance reviewed:     Health Maintenance Due   Topic    Hepatitis C Screening     COVID-19 Vaccine (1)    Pneumococcal 65+ years (2 of 2 - PPSV23)    Eye Exam Retinal or Dilated           Wt Readings from Last 3 Encounters:   21 149 lb (67.6 kg)   20 150 lb 3.2 oz (68.1 kg)   20 149 lb (67.6 kg)        Temp Readings from Last 3 Encounters:   21 97.8 °F (36.6 °C) (Oral)   20 97.6 °F (36.4 °C) (Oral)   20 97.9 °F (36.6 °C)        BP Readings from Last 3 Encounters:   21 137/75   20 129/75   20 120/51        Pulse Readings from Last 3 Encounters:   21 72   20 82   20 62        Vitals:    21 0838   BP: 137/75   Pulse: 72   Resp: 16   Temp: 97.8 °F (36.6 °C)   TempSrc: Oral   SpO2: 97%   Weight: 149 lb (67.6 kg)   Height: 5' 6\" (1.676 m)   PainSc:   0 - No pain          Learning Assessment:   :       Learning Assessment 2016   PRIMARY LEARNER Patient Patient   HIGHEST LEVEL OF EDUCATION - PRIMARY LEARNER  > 4 YEARS OF COLLEGE -   BARRIERS PRIMARY LEARNER NONE -   CO-LEARNER CAREGIVER No -   PRIMARY LANGUAGE ENGLISH ENGLISH   LEARNER PREFERENCE PRIMARY READING DEMONSTRATION   ANSWERED BY Patient patient   RELATIONSHIP SELF SELF        Depression Screening:   :       3 most recent PHQ Screens 2021   Little interest or pleasure in doing things Not at all   Feeling down, depressed, irritable, or hopeless Not at all   Total Score PHQ 2 0        Fall Risk Assessment:   :       Fall Risk Assessment, last 12 mths 4/12/2021   Able to walk? Yes   Fall in past 12 months? 0   Do you feel unsteady? 0   Are you worried about falling 0        Abuse Screening:   :       Abuse Screening Questionnaire 7/2/2020 9/5/2019 8/20/2018 8/24/2016 2/10/2015   Do you ever feel afraid of your partner? N N N N N   Are you in a relationship with someone who physically or mentally threatens you? N N N N N   Is it safe for you to go home?  Y Y Y Y Y        ADL Screening:   :       ADL Assessment 8/20/2018   Feeding yourself No Help Needed   Getting from bed to chair No Help Needed   Getting dressed No Help Needed   Bathing or showering No Help Needed   Walk across the room (includes cane/walker) No Help Needed   Using the telphone No Help Needed   Taking your medications No Help Needed   Preparing meals No Help Needed   Managing money (expenses/bills) No Help Needed   Moderately strenuous housework (laundry) No Help Needed   Shopping for personal items (toiletries/medicines) No Help Needed   Shopping for groceries No Help Needed   Driving No Help Needed   Climbing a flight of stairs No Help Needed   Getting to places beyond walking distances No Help Needed

## 2021-04-12 NOTE — PROGRESS NOTES
HPI  Ms. Phillip Mandujano is a 66y.o. year old female, she is seen today for follow up DM, HTN, high cholesterol. Glucose has been  - lower in the morning and higher at bedtime. Overall good control. Has been walking 3 days per week with friends for exercise. Was seen by ortho for hip pain - told arthritis and meloxicam as needed. No chest pain, sob, dizziness, weakness, HA. Doesn't check BP at home. Chief Complaint   Patient presents with    Hypertension    Diabetes        Prior to Admission medications    Medication Sig Start Date End Date Taking? Authorizing Provider   lancets (OneTouch UltraSoft Lancets) misc USE TO TEST BLOOD SUGAR THREE TIMES A DAY 3/17/21  Yes Lazaro Salgado MD   spironolactone (ALDACTONE) 25 mg tablet Take 1 Tab by mouth daily. 3/4/21  Yes Lazaro Salgado MD   insulin glargine (Lantus U-100 Insulin) 100 unit/mL injection INJECT 14 UNITS EVERY MORNING AND 3 UNITS EVERY EVENING FOR DIABETES CONTROL 2/8/21  Yes Lazaro Salgado MD   meloxicam (MOBIC) 7.5 mg tablet Take 1-2 Tabs by mouth daily as needed for Pain. 11/16/20  Yes Lazaro Salgado MD   Insulin Syringe-Needle U-100 (BD Insulin Syringe Ultra-Fine) 0.3 mL 31 gauge x 5/16\" syrg USE AS DIRECTED TWICE DAILY 9/21/20  Yes Lazaro Salgado MD   glucose blood VI test strips (OneTouch Ultra Blue Test Strip) strip USE TO TEST BLOOD SUGAR THREE TIMES A DAY 9/15/20  Yes Lazaro Salgado MD   metFORMIN (GLUCOPHAGE) 500 mg tablet TAKE 2 TABLETS BY MOUTH EVERY MORNING AND TAKE 1 TABLET BY MOUTH AT NOON AND DINNERTIME 8/28/20  Yes Lazaro Salgado MD   losartan (COZAAR) 50 mg tablet TAKE 1 TABLET BY MOUTH EVERY DAY 7/30/20  Yes Lazaro Salgado MD   Calcium Carbonate-Vit D3-Min (CALTRATE 600+D PLUS MINERALS) 600 mg (1,500 mg)-400 unit Chew Take  by mouth daily. 6/27/12  Yes Mitra Hernandez MD   multivitamins-minerals-lutein (CENTRUM SILVER) Tab Take  by mouth.    Yes Provider, Historical cholecalciferol, vitamin d3, (VITAMIN D) 1,000 unit tablet Take  by mouth daily. Yes Provider, Historical   moxifloxacin (Vigamox) 0.5 % ophthalmic solution Administer 1 Drop to right eye three (3) times daily. Provider, Historical   aspirin delayed-release 81 mg tablet Take 1 Tab by mouth daily. 7/2/20   Petrona Barron NP         Allergies   Allergen Reactions    Lisinopril Cough    Simvastatin Myalgia         REVIEW OF SYSTEMS:  Per HPI    PHYSICAL EXAM:  Visit Vitals  /70   Pulse 72   Temp 97.8 °F (36.6 °C) (Oral)   Resp 16   Ht 5' 6\" (1.676 m)   Wt 149 lb (67.6 kg)   LMP  (LMP Unknown)   SpO2 97%   BMI 24.05 kg/m²     Constitutional: Appears well-developed and well-nourished. No distress. HENT:   Head: Normocephalic and atraumatic. Eyes: No scleral icterus. Cardiovascular: Normal S1/S2, regular rhythm. No murmurs, rubs, or gallops. Pulmonary/Chest: Effort normal and breath sounds normal. No respiratory distress. No wheezes, rhonchi, or rales. Abdomen: Soft, NT/ND, +BS, no rebound or guarding, no masses, no HSM appreciated. Ext: No edema. Neurological: Alert. Psychiatric: Normal mood and affect. Behavior is normal.    DM foot exam: 2+ pedal pulses, no lesions, sensation intact to monofilament b/l      Lab Results   Component Value Date/Time    Sodium 140 04/02/2021 08:32 AM    Potassium 3.8 04/02/2021 08:32 AM    Chloride 100 04/02/2021 08:32 AM    CO2 24 04/02/2021 08:32 AM    Anion gap 9 11/01/2010 08:07 AM    Glucose 120 (H) 04/02/2021 08:32 AM    BUN 17 04/02/2021 08:32 AM    Creatinine 0.79 04/02/2021 08:32 AM    BUN/Creatinine ratio 22 04/02/2021 08:32 AM    GFR est AA 84 04/02/2021 08:32 AM    GFR est non-AA 72 04/02/2021 08:32 AM    Calcium 9.6 04/02/2021 08:32 AM    Bilirubin, total 0.4 04/02/2021 08:32 AM    Alk.  phosphatase 59 04/02/2021 08:32 AM    Protein, total 6.7 04/02/2021 08:32 AM    Albumin 4.4 04/02/2021 08:32 AM    Globulin 2.4 11/01/2010 08:07 AM    A-G Ratio 1.9 04/02/2021 08:32 AM    ALT (SGPT) 13 04/02/2021 08:32 AM     Lab Results   Component Value Date/Time    Hemoglobin A1c 6.9 (H) 04/02/2021 08:32 AM    Hemoglobin A1c 6.7 (H) 09/04/2020 07:49 AM    Hemoglobin A1c 7.2 (H) 02/26/2020 07:49 AM      Lab Results   Component Value Date/Time    Cholesterol, total 175 04/02/2021 08:32 AM    HDL Cholesterol 75 04/02/2021 08:32 AM    LDL, calculated 89 04/02/2021 08:32 AM    LDL, calculated 84 02/26/2020 07:49 AM    VLDL, calculated 11 04/02/2021 08:32 AM    VLDL, calculated 16 02/26/2020 07:49 AM    Triglyceride 57 04/02/2021 08:32 AM    CHOL/HDL Ratio 1.5 11/01/2010 08:07 AM          ASSESSMENT/PLAN  Diagnoses and all orders for this visit:    1. Type 2 diabetes mellitus without complication, with long-term current use of insulin (HCC)  -     HEMOGLOBIN A1C WITH EAG; Future  -      DIABETES FOOT EXAM  At goal - continue current medications   2. Mixed hyperlipidemia  -     METABOLIC PANEL, COMPREHENSIVE; Future  -     LIPID PANEL; Future  At goal, continue current medications    3. Essential hypertension  Controlled on current regimen, continue         Health Maintenance Due   Topic Date Due    Hepatitis C Screening  Never done    COVID-19 Vaccine (1) Never done    Pneumococcal 65+ years (2 of 2 - PPSV23) 09/04/2016    Eye Exam Retinal or Dilated  01/28/2021        Follow-up and Dispositions    · Return in about 6 months (around 10/12/2021) for bp, chol, dm, AWV, labs prior. Reviewed plan of care. Patient has provided input and agrees with goals. The nurse provided the patient and/or family with advanced directive information if needed and encouraged the patient to provide a copy to the office when available.

## 2021-06-07 NOTE — PROGRESS NOTES
[facility]    Chief Complaint   Patient presents with    Blood Pressure Check     Room 2A//     Cholesterol Problem    Diabetes           Reviewed record In preparation for visit and have obtained necessary documentation    1. Have you been to the ER, urgent care clinic since your last visit? Hospitalized since your last visit? NO  2. Have you seen or consulted any other health care providers outside of the [address] [place] [address] since your last visit? Include any pap smears or colon screening. NO    Patient has advance directive on file    Provider advised of reason for visit and vitals    Health Maintenance Due   Topic    Influenza Age 5 to Adult        Wt Readings from Last 3 Encounters:   09/05/19 144 lb 12.8 oz (65.7 kg)   05/16/19 143 lb 12.8 oz (65.2 kg)   02/13/19 145 lb 6.4 oz (66 kg)     Temp Readings from Last 3 Encounters:   09/05/19 97.8 °F (36.6 °C) (Oral)   05/16/19 98.1 °F (36.7 °C) (Oral)   02/13/19 97.6 °F (36.4 °C) (Oral)     BP Readings from Last 3 Encounters:   09/05/19 114/69   05/16/19 120/66   02/13/19 118/76     Pulse Readings from Last 3 Encounters:   09/05/19 71   05/16/19 91   02/13/19 83         Learning Assessment:  :     Learning Assessment 2/24/2016 4/28/2014   PRIMARY LEARNER Patient Patient   HIGHEST LEVEL OF EDUCATION - PRIMARY LEARNER  > 4 YEARS OF COLLEGE -   BARRIERS PRIMARY LEARNER NONE -   Nohelia Tavarez CAREGIVER No -   PRIMARY LANGUAGE ENGLISH ENGLISH   LEARNER PREFERENCE PRIMARY READING DEMONSTRATION   ANSWERED BY Patient patient   RELATIONSHIP SELF SELF       Depression Screening:  :     3 most recent PHQ Screens 3/11/2020   Little interest or pleasure in doing things Not at all   Feeling down, depressed, irritable, or hopeless Not at all   Total Score PHQ 2 0       Fall Risk Assessment:  :     Fall Risk Assessment, last 12 mths 5/16/2019   Able to walk? Yes   Fall in past 12 months?  No       Abuse Screening:  :     Abuse Screening Questionnaire 9/5/2019 8/20/2018 8/24/2016 2/10/2015   Do you ever feel afraid of your partner? N N N N   Are you in a relationship with someone who physically or mentally threatens you? N N N N   Is it safe for you to go home? Y Y Y Y       ADL Screening:  :     ADL Assessment 8/20/2018   Feeding yourself No Help Needed   Getting from bed to chair No Help Needed   Getting dressed No Help Needed   Bathing or showering No Help Needed   Walk across the room (includes cane/walker) No Help Needed   Using the telphone No Help Needed   Taking your medications No Help Needed   Preparing meals No Help Needed   Managing money (expenses/bills) No Help Needed   Moderately strenuous housework (laundry) No Help Needed   Shopping for personal items (toiletries/medicines) No Help Needed   Shopping for groceries No Help Needed   Driving No Help Needed   Climbing a flight of stairs No Help Needed   Getting to places beyond walking distances No Help Needed           Medication reconciliation up to date and corrected with patient at this time. Wartpeel Counseling:  I discussed with the patient the risks of Wartpeel including but not limited to erythema, scaling, itching, weeping, crusting, and pain.

## 2021-07-19 DIAGNOSIS — Z79.4 TYPE 2 DIABETES MELLITUS WITHOUT COMPLICATION, WITH LONG-TERM CURRENT USE OF INSULIN (HCC): ICD-10-CM

## 2021-07-19 DIAGNOSIS — M25.552 LEFT HIP PAIN: ICD-10-CM

## 2021-07-19 DIAGNOSIS — E11.9 TYPE 2 DIABETES MELLITUS WITHOUT COMPLICATION, WITH LONG-TERM CURRENT USE OF INSULIN (HCC): ICD-10-CM

## 2021-07-19 RX ORDER — MELOXICAM 7.5 MG/1
7.5-15 TABLET ORAL
Qty: 30 TABLET | Refills: 1 | Status: SHIPPED | OUTPATIENT
Start: 2021-07-19

## 2021-07-19 RX ORDER — CALCIUM CARB/VITAMIN D3/VIT K1 500-100-40
TABLET,CHEWABLE ORAL
Qty: 100 SYRINGE | Refills: 5 | Status: SHIPPED | OUTPATIENT
Start: 2021-07-19

## 2021-07-19 NOTE — TELEPHONE ENCOUNTER
[facility] on file sent a fax requesting a refill of   Requested Prescriptions     Pending Prescriptions Disp Refills    meloxicam (MOBIC) 7.5 mg tablet 30 Tablet 1     Sig: Take 1-2 Tablets by mouth daily as needed for Pain.     Insulin Syringe-Needle U-100 (BD Insulin Syringe Ultra-Fine) 0.3 mL 31 gauge x 5/16\" syrg 100 Syringe 5     Sig: USE AS DIRECTED TWICE DAILY

## 2021-07-19 NOTE — TELEPHONE ENCOUNTER
PCP: Clifford De Jesus MD    Last appt: 4/12/2021  Future Appointments   Date Time Provider Segundo Wood   10/12/2021  8:30 AM Clifford De Jesus MD [facility] AMB       Requested Prescriptions     Pending Prescriptions Disp Refills    meloxicam (MOBIC) 7.5 mg tablet 30 Tablet 1     Sig: Take 1-2 Tablets by mouth daily as needed for Pain.     Insulin Syringe-Needle U-100 (BD Insulin Syringe Ultra-Fine) 0.3 mL 31 gauge x 5/16\" syrg 100 Syringe 5     Sig: USE AS DIRECTED TWICE DAILY

## 2021-08-17 DIAGNOSIS — E11.9 TYPE 2 DIABETES MELLITUS WITHOUT COMPLICATION, WITH LONG-TERM CURRENT USE OF INSULIN (HCC): ICD-10-CM

## 2021-08-17 DIAGNOSIS — Z79.4 TYPE 2 DIABETES MELLITUS WITHOUT COMPLICATION, WITH LONG-TERM CURRENT USE OF INSULIN (HCC): ICD-10-CM

## 2021-08-17 RX ORDER — METFORMIN HYDROCHLORIDE 500 MG/1
TABLET ORAL
Qty: 360 TABLET | Refills: 3 | Status: SHIPPED | OUTPATIENT
Start: 2021-08-17 | End: 2022-09-06

## 2021-10-05 LAB
ALBUMIN SERPL-MCNC: 4.3 G/DL (ref 3.7–4.7)
ALBUMIN/GLOB SERPL: 2 {RATIO} (ref 1.2–2.2)
ALP SERPL-CCNC: 55 IU/L (ref 44–121)
ALT SERPL-CCNC: 16 IU/L (ref 0–32)
AST SERPL-CCNC: 21 IU/L (ref 0–40)
BILIRUB SERPL-MCNC: 0.4 MG/DL (ref 0–1.2)
BUN SERPL-MCNC: 21 MG/DL (ref 8–27)
BUN/CREAT SERPL: 26 (ref 12–28)
CALCIUM SERPL-MCNC: 9.5 MG/DL (ref 8.7–10.3)
CHLORIDE SERPL-SCNC: 104 MMOL/L (ref 96–106)
CHOLEST SERPL-MCNC: 178 MG/DL (ref 100–199)
CO2 SERPL-SCNC: 24 MMOL/L (ref 20–29)
CREAT SERPL-MCNC: 0.82 MG/DL (ref 0.57–1)
EST. AVERAGE GLUCOSE BLD GHB EST-MCNC: 160 MG/DL
GLOBULIN SER CALC-MCNC: 2.1 G/DL (ref 1.5–4.5)
GLUCOSE SERPL-MCNC: 134 MG/DL (ref 65–99)
HBA1C MFR BLD: 7.2 % (ref 4.8–5.6)
HDLC SERPL-MCNC: 70 MG/DL
LDLC SERPL CALC-MCNC: 96 MG/DL (ref 0–99)
POTASSIUM SERPL-SCNC: 4.4 MMOL/L (ref 3.5–5.2)
PROT SERPL-MCNC: 6.4 G/DL (ref 6–8.5)
SODIUM SERPL-SCNC: 141 MMOL/L (ref 134–144)
TRIGL SERPL-MCNC: 64 MG/DL (ref 0–149)
VLDLC SERPL CALC-MCNC: 12 MG/DL (ref 5–40)

## 2021-10-12 ENCOUNTER — OFFICE VISIT (OUTPATIENT)
Dept: INTERNAL MEDICINE CLINIC | Age: 76
End: 2021-10-12
Payer: MEDICARE

## 2021-10-12 VITALS
RESPIRATION RATE: 16 BRPM | TEMPERATURE: 97.7 F | WEIGHT: 150.8 LBS | HEART RATE: 80 BPM | DIASTOLIC BLOOD PRESSURE: 74 MMHG | SYSTOLIC BLOOD PRESSURE: 121 MMHG | HEIGHT: 66 IN | OXYGEN SATURATION: 96 % | BODY MASS INDEX: 24.23 KG/M2

## 2021-10-12 DIAGNOSIS — I10 ESSENTIAL HYPERTENSION: ICD-10-CM

## 2021-10-12 DIAGNOSIS — Z00.00 MEDICARE ANNUAL WELLNESS VISIT, SUBSEQUENT: Primary | ICD-10-CM

## 2021-10-12 DIAGNOSIS — R23.8 PAPULE OF SKIN: ICD-10-CM

## 2021-10-12 DIAGNOSIS — Z79.4 TYPE 2 DIABETES MELLITUS WITHOUT COMPLICATION, WITH LONG-TERM CURRENT USE OF INSULIN (HCC): ICD-10-CM

## 2021-10-12 DIAGNOSIS — Z11.59 NEED FOR HEPATITIS C SCREENING TEST: ICD-10-CM

## 2021-10-12 DIAGNOSIS — E78.2 MIXED HYPERLIPIDEMIA: ICD-10-CM

## 2021-10-12 DIAGNOSIS — Z23 ENCOUNTER FOR IMMUNIZATION: ICD-10-CM

## 2021-10-12 DIAGNOSIS — E11.9 TYPE 2 DIABETES MELLITUS WITHOUT COMPLICATION, WITH LONG-TERM CURRENT USE OF INSULIN (HCC): ICD-10-CM

## 2021-10-12 PROCEDURE — 3051F HG A1C>EQUAL 7.0%<8.0%: CPT | Performed by: INTERNAL MEDICINE

## 2021-10-12 PROCEDURE — G8427 DOCREV CUR MEDS BY ELIG CLIN: HCPCS | Performed by: INTERNAL MEDICINE

## 2021-10-12 PROCEDURE — 99214 OFFICE O/P EST MOD 30 MIN: CPT | Performed by: INTERNAL MEDICINE

## 2021-10-12 PROCEDURE — G8752 SYS BP LESS 140: HCPCS | Performed by: INTERNAL MEDICINE

## 2021-10-12 PROCEDURE — 90732 PPSV23 VACC 2 YRS+ SUBQ/IM: CPT | Performed by: INTERNAL MEDICINE

## 2021-10-12 PROCEDURE — 1090F PRES/ABSN URINE INCON ASSESS: CPT | Performed by: INTERNAL MEDICINE

## 2021-10-12 PROCEDURE — G0439 PPPS, SUBSEQ VISIT: HCPCS | Performed by: INTERNAL MEDICINE

## 2021-10-12 PROCEDURE — G0009 ADMIN PNEUMOCOCCAL VACCINE: HCPCS | Performed by: INTERNAL MEDICINE

## 2021-10-12 PROCEDURE — G8420 CALC BMI NORM PARAMETERS: HCPCS | Performed by: INTERNAL MEDICINE

## 2021-10-12 PROCEDURE — 1101F PT FALLS ASSESS-DOCD LE1/YR: CPT | Performed by: INTERNAL MEDICINE

## 2021-10-12 PROCEDURE — G8510 SCR DEP NEG, NO PLAN REQD: HCPCS | Performed by: INTERNAL MEDICINE

## 2021-10-12 PROCEDURE — G8536 NO DOC ELDER MAL SCRN: HCPCS | Performed by: INTERNAL MEDICINE

## 2021-10-12 PROCEDURE — G8754 DIAS BP LESS 90: HCPCS | Performed by: INTERNAL MEDICINE

## 2021-10-12 RX ORDER — ROSUVASTATIN CALCIUM 5 MG/1
5 TABLET, COATED ORAL
Qty: 90 TABLET | Refills: 1 | Status: SHIPPED | OUTPATIENT
Start: 2021-10-12

## 2021-10-12 NOTE — PROGRESS NOTES
Carolyn Cowan  Identified pt with two pt identifiers(name and ). Chief Complaint   Patient presents with    Annual Wellness Visit    Results    Cyst     On back for several months       Reviewed record In preparation for visit and have obtained necessary documentation. 1. Have you been to the ER, urgent care clinic or hospitalized since your last visit? No     2. Have you seen or consulted any other health care providers outside of the [address] [place] [address] since your last visit? Include any pap smears or colon screening. No    Patient has an advance directive. Vitals reviewed with provider.     Health Maintenance reviewed:     Health Maintenance Due   Topic    Hepatitis C Screening     Pneumococcal 65+ years (2 of 2 - PPSV23)    MICROALBUMIN Q1     Medicare Yearly Exam           Wt Readings from Last 3 Encounters:   10/12/21 150 lb 12.8 oz (68.4 kg)   21 149 lb (67.6 kg)   20 150 lb 3.2 oz (68.1 kg)        Temp Readings from Last 3 Encounters:   10/12/21 97.7 °F (36.5 °C) (Oral)   21 97.8 °F (36.6 °C) (Oral)   20 97.6 °F (36.4 °C) (Oral)        BP Readings from Last 3 Encounters:   10/12/21 121/74   21 120/70   20 129/75        Pulse Readings from Last 3 Encounters:   10/12/21 80   21 72   20 82        Vitals:    10/12/21 0827   BP: 121/74   Pulse: 80   Resp: 16   Temp: 97.7 °F (36.5 °C)   TempSrc: Oral   SpO2: 96%   Weight: 150 lb 12.8 oz (68.4 kg)   Height: 5' 6\" (1.676 m)   PainSc:   0 - No pain          Learning Assessment:   :       Learning Assessment 2016   PRIMARY LEARNER Patient Patient   HIGHEST LEVEL OF EDUCATION - PRIMARY LEARNER  > 4 YEARS OF COLLEGE -   BARRIERS PRIMARY LEARNER NONE -   CO-LEARNER CAREGIVER No -   PRIMARY LANGUAGE ENGLISH ENGLISH   LEARNER PREFERENCE PRIMARY READING DEMONSTRATION   ANSWERED BY Patient patient   RELATIONSHIP SELF SELF        Depression Screening:   :       3 most recent PHQ Screens 10/12/2021   Little interest or pleasure in doing things Not at all   Feeling down, depressed, irritable, or hopeless Not at all   Total Score PHQ 2 0        Fall Risk Assessment:   :       Fall Risk Assessment, last 12 mths 10/12/2021   Able to walk? Yes   Fall in past 12 months? 0   Do you feel unsteady? 0   Are you worried about falling 0        Abuse Screening:   :       Abuse Screening Questionnaire 10/12/2021 7/2/2020 9/5/2019 8/20/2018 8/24/2016 2/10/2015   Do you ever feel afraid of your partner? N N N N N N   Are you in a relationship with someone who physically or mentally threatens you? N N N N N N   Is it safe for you to go home?  Y Y Y Y Y Y        ADL Screening:   :       ADL Assessment 10/12/2021   Feeding yourself No Help Needed   Getting from bed to chair No Help Needed   Getting dressed No Help Needed   Bathing or showering No Help Needed   Walk across the room (includes cane/walker) No Help Needed   Using the telphone No Help Needed   Taking your medications No Help Needed   Preparing meals No Help Needed   Managing money (expenses/bills) No Help Needed   Moderately strenuous housework (laundry) No Help Needed   Shopping for personal items (toiletries/medicines) No Help Needed   Shopping for groceries No Help Needed   Driving No Help Needed   Climbing a flight of stairs No Help Needed   Getting to places beyond walking distances No Help Needed

## 2021-10-12 NOTE — PROGRESS NOTES
HPI  Ms. Mary Granger is a 64y.o. year old female, she is seen today for follow up DM, cholesterol. Says over the summer wasn't walking as much. Brings lo-187 am, mid day ,  bedtime. Will try walking more for exercise. No chest pain, sob, dizziness, weakness. No n/v/abd pain. Notes skin spot left upper back for a few months, not changing, no bleeding. Chief Complaint   Patient presents with    Annual Wellness Visit    Results    Cyst     On back for several months        Prior to Admission medications    Medication Sig Start Date End Date Taking? Authorizing Provider   rosuvastatin (CRESTOR) 5 mg tablet Take 1 Tablet by mouth nightly. 10/12/21  Yes Rashid Vargas MD   metFORMIN (GLUCOPHAGE) 500 mg tablet TAKE 2 TABLETS BY MOUTH EVERY MORNING AND TAKE 1 TABLET BY MOUTH AT NOON AND DINNERTIME 21  Yes Rashid Vargas MD   meloxicam (MOBIC) 7.5 mg tablet Take 1-2 Tablets by mouth daily as needed for Pain. 21  Yes Rashid Vargas MD   Insulin Syringe-Needle U-100 (BD Insulin Syringe Ultra-Fine) 0.3 mL 31 gauge x 5/16\" syrg USE AS DIRECTED TWICE DAILY 21  Yes Rashid Vargas MD   losartan (COZAAR) 50 mg tablet TAKE 1 TABLET BY MOUTH EVERY DAY 21  Yes Marianne Burnham MD   lancets (OneTouch UltraSoft Lancets) misc USE TO TEST BLOOD SUGAR THREE TIMES A DAY 3/17/21  Yes Rashid Vargas MD   spironolactone (ALDACTONE) 25 mg tablet Take 1 Tab by mouth daily. 3/4/21  Yes Rashid Vargas MD   insulin glargine (Lantus U-100 Insulin) 100 unit/mL injection INJECT 14 UNITS EVERY MORNING AND 3 UNITS EVERY EVENING FOR DIABETES CONTROL 21  Yes Rashid Vargas MD   glucose blood VI test strips (OneTouch Ultra Blue Test Strip) strip USE TO TEST BLOOD SUGAR THREE TIMES A DAY 9/15/20  Yes Rashid Vargas MD   moxifloxacin (Vigamox) 0.5 % ophthalmic solution Administer 1 Drop to right eye three (3) times daily.    Yes Provider, Historical aspirin delayed-release 81 mg tablet Take 1 Tab by mouth daily. 7/2/20  Yes Abhi Rojo NP   Calcium Carbonate-Vit D3-Min (CALTRATE 600+D PLUS MINERALS) 600 mg (1,500 mg)-400 unit Chew Take  by mouth daily. 6/27/12  Yes Autumn Barrera MD   multivitamins-minerals-lutein (CENTRUM SILVER) Tab Take  by mouth. Yes Provider, Historical   cholecalciferol, vitamin d3, (VITAMIN D) 1,000 unit tablet Take  by mouth daily. Yes Provider, Historical         Allergies   Allergen Reactions    Lisinopril Cough    Simvastatin Myalgia         REVIEW OF SYSTEMS:  Per HPI    PHYSICAL EXAM:  Visit Vitals  /74 (BP 1 Location: Left arm, BP Patient Position: Semi fowlers, BP Cuff Size: Adult)   Pulse 80   Temp 97.7 °F (36.5 °C) (Oral)   Resp 16   Ht 5' 6\" (1.676 m)   Wt 150 lb 12.8 oz (68.4 kg)   LMP  (LMP Unknown)   SpO2 96%   BMI 24.34 kg/m²     Constitutional: Appears well-developed and well-nourished. No distress. Skin: approx 2mm tan papule left upper back (area of concern)  HENT:   Head: Normocephalic and atraumatic. Eyes: No scleral icterus. Neck: no lad, no tm, supple   Cardiovascular: Normal S1/S2, regular rhythm. No murmurs, rubs, or gallops. Pulmonary/Chest: Effort normal and breath sounds normal. No respiratory distress. No wheezes, rhonchi, or rales. Abdomen: Soft, NT/ND, +BS, no rebound or guarding, no masses, no HSM appreciated. Ext: No edema. Neurological: Alert. Psychiatric: Normal mood and affect.  Behavior is normal.     Lab Results   Component Value Date/Time    Sodium 141 10/04/2021 08:05 AM    Potassium 4.4 10/04/2021 08:05 AM    Chloride 104 10/04/2021 08:05 AM    CO2 24 10/04/2021 08:05 AM    Anion gap 9 11/01/2010 08:07 AM    Glucose 134 (H) 10/04/2021 08:05 AM    BUN 21 10/04/2021 08:05 AM    Creatinine 0.82 10/04/2021 08:05 AM    BUN/Creatinine ratio 26 10/04/2021 08:05 AM    GFR est AA 79 10/04/2021 08:05 AM    GFR est non-AA 69 10/04/2021 08:05 AM    Calcium 9.5 10/04/2021 08:05 AM    Bilirubin, total 0.4 10/04/2021 08:05 AM    Alk. phosphatase 55 10/04/2021 08:05 AM    Protein, total 6.4 10/04/2021 08:05 AM    Albumin 4.3 10/04/2021 08:05 AM    Globulin 2.4 11/01/2010 08:07 AM    A-G Ratio 2.0 10/04/2021 08:05 AM    ALT (SGPT) 16 10/04/2021 08:05 AM     Lab Results   Component Value Date/Time    Hemoglobin A1c 7.2 (H) 10/04/2021 08:05 AM    Hemoglobin A1c 6.9 (H) 04/02/2021 08:32 AM    Hemoglobin A1c 6.7 (H) 09/04/2020 07:49 AM      Lab Results   Component Value Date/Time    Cholesterol, total 178 10/04/2021 08:05 AM    HDL Cholesterol 70 10/04/2021 08:05 AM    LDL, calculated 96 10/04/2021 08:05 AM    LDL, calculated 84 02/26/2020 07:49 AM    VLDL, calculated 12 10/04/2021 08:05 AM    VLDL, calculated 16 02/26/2020 07:49 AM    Triglyceride 64 10/04/2021 08:05 AM    CHOL/HDL Ratio 1.5 11/01/2010 08:07 AM          ASSESSMENT/PLAN  Diagnoses and all orders for this visit:    1. Medicare annual wellness visit, subsequent    2. Type 2 diabetes mellitus without complication, with long-term current use of insulin (HCC)  -     MICROALBUMIN, UR, RAND W/ MICROALB/CREAT RATIO; Future  -     HEMOGLOBIN A1C WITH EAG; Future  -     METABOLIC PANEL, COMPREHENSIVE; Future  Higher a1c but still at goal <7.5 - continue current dose insulin, try to add more exercise  3. Essential hypertension  Controlled on current regimen, continue   4. Mixed hyperlipidemia  -     rosuvastatin (CRESTOR) 5 mg tablet; Take 1 Tablet by mouth nightly. -     LIPID PANEL; Future  LDL not to goal - add above - take as many days of the week she can without myalgia  5. Need for hepatitis C screening test  -     HCV AB W/RFLX TO ROSAURA; Future    6. Encounter for immunization  -     PNEUMOCOCCAL POLYSACCHARIDE VACCINE, 23-VALENT, ADULT OR IMMUNOSUPPRESSED PT DOSE,    7.  Papule of skin  Looks benign - will discuss at next derm visit        Health Maintenance Due   Topic Date Due    Hepatitis C Screening  Never done  MICROALBUMIN Q1  09/16/2021        Follow-up and Dispositions    · Return in about 6 months (around 4/12/2022) for dm, chol, bp, labs prior. Reviewed plan of care. Patient has provided input and agrees with goals. The nurse provided the patient and/or family with advanced directive information if needed and encouraged the patient to provide a copy to the office when available. This is the Subsequent Medicare Annual Wellness Exam, performed 12 months or more after the Initial AWV or the last Subsequent AWV    I have reviewed the patient's medical history in detail and updated the computerized patient record. Assessment/Plan   Education and counseling provided:  Are appropriate based on today's review and evaluation    1. Medicare annual wellness visit, subsequent  2. Type 2 diabetes mellitus without complication, with long-term current use of insulin (HCC)  -     MICROALBUMIN, UR, RAND W/ MICROALB/CREAT RATIO; Future  -     HEMOGLOBIN A1C WITH EAG; Future  -     METABOLIC PANEL, COMPREHENSIVE; Future  3. Essential hypertension  4. Mixed hyperlipidemia  -     rosuvastatin (CRESTOR) 5 mg tablet; Take 1 Tablet by mouth nightly., Normal, Disp-90 Tablet, R-1  -     LIPID PANEL; Future  5. Need for hepatitis C screening test  -     HCV AB W/RFLX TO ROSAURA; Future  6. Encounter for immunization  -     PNEUMOCOCCAL POLYSACCHARIDE VACCINE, 23-VALENT, ADULT OR IMMUNOSUPPRESSED PT DOSE,  7.  Papule of skin       Depression Risk Factor Screening     3 most recent PHQ Screens 10/12/2021   Little interest or pleasure in doing things Not at all   Feeling down, depressed, irritable, or hopeless Not at all   Total Score PHQ 2 0       Alcohol Risk Screen    Do you average more than 1 drink per night or more than 7 drinks a week:  No    On any one occasion in the past three months have you have had more than 3 drinks containing alcohol:  No        Functional Ability and Level of Safety    Hearing: Hearing is good. Activities of Daily Living: The home contains: grab bars  Patient does total self care      Ambulation: with no difficulty     Fall Risk:  Fall Risk Assessment, last 12 mths 10/12/2021   Able to walk? Yes   Fall in past 12 months? 0   Do you feel unsteady?  0   Are you worried about falling 0      Abuse Screen:  Patient is not abused       Cognitive Screening    Has your family/caregiver stated any concerns about your memory: no     Cognitive Screening: N/A    Health Maintenance Due     Health Maintenance Due   Topic Date Due    Hepatitis C Screening  Never done    MICROALBUMIN Q1  09/16/2021       Patient Care Team   Patient Care Team:  Keven Troncoso MD as PCP - General (Internal Medicine)  Keven Troncoso MD as PCP - [facility] Empaneled Provider    History     Patient Active Problem List   Diagnosis Code    Diabetes mellitus ([facility] [address] E11.9    Colon polyps K63.5    Compression fracture of thoracic vertebra ([facility] [address] S22.000A    Right hip pain M25.551    Hyperlipidemia E78.5    Tinea unguium B35.1    Skin cancer C44.90    Hip pain, left M25.552    Osteoporosis M81.0    Dense breast R92.2    Combined forms of age-related cataract of right eye H25.811    Essential hypertension I10     Past Medical History:   Diagnosis Date    Abnormal EKG 07/14/2020    pt getting workup with Dr. Adair Coleman     lower back    Cancer [facility])     skin     Depression     mild with occasional use of prozac    Diabetes ([facility] [address]     Nausea & vomiting     Psychiatric disorder     depression      Past Surgical History:   Procedure Laterality Date    COLONOSCOPY N/A 11/16/2016    COLONOSCOPY performed by Heriberto Garcia MD at [facility]  11/16/2016         HX BREAST BIOPSY Left     benign cyst removed    HX CATARACT REMOVAL Left 08/2020    HX ORTHOPAEDIC      right hip arthroscopy - Dr. Lorenzo Host HX [facility] AND [address]     paps  wnl fibroids    HX TONSILLECTOMY      HX VEIN STRIPPING Right     vein removed x1    MS COLSC FLX W/REMOVAL LESION BY HOT BX FORCEPS  8/22/2011          Current Outpatient Medications   Medication Sig Dispense Refill    rosuvastatin (CRESTOR) 5 mg tablet Take 1 Tablet by mouth nightly. 90 Tablet 1    metFORMIN (GLUCOPHAGE) 500 mg tablet TAKE 2 TABLETS BY MOUTH EVERY MORNING AND TAKE 1 TABLET BY MOUTH AT NOON AND DINNERTIME 360 Tablet 3    meloxicam (MOBIC) 7.5 mg tablet Take 1-2 Tablets by mouth daily as needed for Pain. 30 Tablet 1    Insulin Syringe-Needle U-100 (BD Insulin Syringe Ultra-Fine) 0.3 mL 31 gauge x 5/16\" syrg USE AS DIRECTED TWICE DAILY 100 Syringe 5    losartan (COZAAR) 50 mg tablet TAKE 1 TABLET BY MOUTH EVERY DAY 90 Tablet 1    lancets (OneTouch UltraSoft Lancets) misc USE TO TEST BLOOD SUGAR THREE TIMES A  Each 5    spironolactone (ALDACTONE) 25 mg tablet Take 1 Tab by mouth daily. 90 Tab 3    insulin glargine (Lantus U-100 Insulin) 100 unit/mL injection INJECT 14 UNITS EVERY MORNING AND 3 UNITS EVERY EVENING FOR DIABETES CONTROL 10 mL 10    glucose blood VI test strips (OneTouch Ultra Blue Test Strip) strip USE TO TEST BLOOD SUGAR THREE TIMES A  Strip 3    moxifloxacin (Vigamox) 0.5 % ophthalmic solution Administer 1 Drop to right eye three (3) times daily.  aspirin delayed-release 81 mg tablet Take 1 Tab by mouth daily. 30 Tab 3    Calcium Carbonate-Vit D3-Min (CALTRATE 600+D PLUS MINERALS) 600 mg (1,500 mg)-400 unit Chew Take  by mouth daily.  multivitamins-minerals-lutein (CENTRUM SILVER) Tab Take  by mouth.  cholecalciferol, vitamin d3, (VITAMIN D) 1,000 unit tablet Take  by mouth daily. Allergies   Allergen Reactions    Lisinopril Cough    Simvastatin Myalgia       Family History   Problem Relation Age of Onset    Hypertension Brother      Social History     Tobacco Use    Smoking status: Never Smoker    Smokeless tobacco: Never Used   Substance Use Topics    Alcohol use:  Yes Alcohol/week: 3.0 standard drinks     Types: 3 Glasses of wine per week         Stefano Garcia MD

## 2021-10-12 NOTE — PATIENT INSTRUCTIONS

## 2021-10-13 LAB
ALBUMIN/CREAT UR: 5 MG/G CREAT (ref 0–29)
CREAT UR-MCNC: 158.1 MG/DL
MICROALBUMIN UR-MCNC: 8.5 UG/ML

## 2021-10-25 RX ORDER — PEN NEEDLE, DIABETIC 29 G X1/2"
NEEDLE, DISPOSABLE MISCELLANEOUS
Qty: 100 EACH | Refills: 5 | Status: SHIPPED | OUTPATIENT
Start: 2021-10-25 | End: 2022-08-23

## 2021-11-08 ENCOUNTER — TRANSCRIBE ORDER (OUTPATIENT)
Dept: SCHEDULING | Age: 76
End: 2021-11-08

## 2021-11-08 DIAGNOSIS — Z12.31 VISIT FOR SCREENING MAMMOGRAM: Primary | ICD-10-CM

## 2021-12-12 RX ORDER — BLOOD SUGAR DIAGNOSTIC
STRIP MISCELLANEOUS
Qty: 300 STRIP | Refills: 3 | Status: SHIPPED | OUTPATIENT
Start: 2021-12-12 | End: 2022-01-07 | Stop reason: SDUPTHER

## 2021-12-13 ENCOUNTER — TELEPHONE (OUTPATIENT)
Dept: INTERNAL MEDICINE CLINIC | Age: 76
End: 2021-12-13

## 2021-12-13 NOTE — TELEPHONE ENCOUNTER
Returned phone call to pt, HIPAA verified by two patient identifiers. Advised that I have called the pharmacy to check on status of test strips. Per pharmacy, the earliest fill will be on 12/18. Pt verified understanding.

## 2021-12-22 ENCOUNTER — HOSPITAL ENCOUNTER (OUTPATIENT)
Dept: MAMMOGRAPHY | Age: 76
Discharge: HOME | End: 2021-12-22
Attending: INTERNAL MEDICINE
Payer: MEDICARE

## 2021-12-22 DIAGNOSIS — Z12.31 VISIT FOR SCREENING MAMMOGRAM: ICD-10-CM

## 2021-12-22 PROCEDURE — 77067 SCR MAMMO BI INCL CAD: CPT

## 2021-12-29 ENCOUNTER — HOSPITAL ENCOUNTER (OUTPATIENT)
Dept: MAMMOGRAPHY | Age: 76
Discharge: HOME | End: 2021-12-29
Attending: INTERNAL MEDICINE
Payer: MEDICARE

## 2021-12-29 ENCOUNTER — HOSPITAL ENCOUNTER (OUTPATIENT)
Dept: ULTRASOUND IMAGING | Age: 76
Discharge: HOME | End: 2021-12-29
Attending: INTERNAL MEDICINE
Payer: MEDICARE

## 2021-12-29 DIAGNOSIS — R92.8 ABNORMAL MAMMOGRAM: ICD-10-CM

## 2021-12-29 PROCEDURE — 77061 BREAST TOMOSYNTHESIS UNI: CPT

## 2021-12-29 RX ORDER — BLOOD SUGAR DIAGNOSTIC
STRIP MISCELLANEOUS
Qty: 300 STRIP | Refills: 3 | Status: CANCELLED | OUTPATIENT
Start: 2021-12-29

## 2021-12-29 NOTE — TELEPHONE ENCOUNTER
Requested Prescriptions     Pending Prescriptions Disp Refills    glucose blood VI test strips (OneTouch Ultra Test) strip 300 Strip 3     Sig: USE TO TEST BLOOD SUGAR THREE TIMES DAILY     Pt called and stated that [facility] told her they need a pa form to be completed before filling these strips

## 2021-12-29 NOTE — TELEPHONE ENCOUNTER
Called pt, HIPAA verified by two patient identifiers. Informed pt that she should contact her insurance company to see what brand they cover and to call us back so that we can send in rx. Pt verified understanding.

## 2021-12-30 NOTE — TELEPHONE ENCOUNTER
Pt called and stated that when she went to  her test strips they were over $400. Pt stated that the pharmacy told her and gave her the rejection letter stating that it was rejected for 100 test strips and pt stated that normally she gets 300 ( 3 month supply) pt also stated that [facility] needed a CMN form. I refaxed the original [employer] form.

## 2021-12-30 NOTE — TELEPHONE ENCOUNTER
CMN form was not filled out properly. I reprinted it, filled it out properly and sent it back to [place].

## 2022-01-04 ENCOUNTER — TELEPHONE (OUTPATIENT)
Dept: INTERNAL MEDICINE CLINIC | Age: 77
End: 2022-01-04

## 2022-01-07 ENCOUNTER — PATIENT MESSAGE (OUTPATIENT)
Dept: INTERNAL MEDICINE CLINIC | Age: 77
End: 2022-01-07

## 2022-01-07 DIAGNOSIS — E11.9 TYPE 2 DIABETES MELLITUS WITHOUT COMPLICATION, WITH LONG-TERM CURRENT USE OF INSULIN (HCC): Primary | ICD-10-CM

## 2022-01-07 DIAGNOSIS — Z79.4 TYPE 2 DIABETES MELLITUS WITHOUT COMPLICATION, WITH LONG-TERM CURRENT USE OF INSULIN (HCC): Primary | ICD-10-CM

## 2022-01-07 DIAGNOSIS — E11.9 TYPE 2 DIABETES MELLITUS WITHOUT COMPLICATION, WITH LONG-TERM CURRENT USE OF INSULIN (HCC): ICD-10-CM

## 2022-01-07 DIAGNOSIS — Z79.4 TYPE 2 DIABETES MELLITUS WITHOUT COMPLICATION, WITH LONG-TERM CURRENT USE OF INSULIN (HCC): ICD-10-CM

## 2022-01-07 RX ORDER — LANCETS
EACH MISCELLANEOUS
Qty: 300 EACH | Refills: 5 | Status: CANCELLED | OUTPATIENT
Start: 2022-01-07

## 2022-01-07 NOTE — TELEPHONE ENCOUNTER
From: Nate Torres  To: Chandrakant Arredondo MD  Sent: 1/7/2022 4:26 PM EST  Subject: Lantus insulin refill    I need a refill of my Lantus insulin prescription. I have changed my Pharmacy to [facility] . Please send the prescription to [facility] instead of my previous pharmacy[facility].   Nate oTrres

## 2022-01-07 NOTE — TELEPHONE ENCOUNTER
PCP: Rigo Constantino MD    Last appt: 10/12/2021  Future Appointments   Date Time Provider [facility]   4/11/2022  8:30 AM Rigo Constantino MD [facility]       Requested Prescriptions     Pending Prescriptions Disp Refills    lancets (OneTouch UltraSoft Lancets) misc 300 Each 5     Sig: USE TO TEST BLOOD SUGAR THREE TIMES A DAY

## 2022-01-07 NOTE — TELEPHONE ENCOUNTER
PCP: Naldo Jay MD     Last appt: 10/12/2021   Future Appointments   Date Time Provider [facility] Wood   4/11/2022  8:30 AM Naldo Jay MD [facility] AMB        Requested Prescriptions     Pending Prescriptions Disp Refills    insulin glargine (Lantus U-100 Insulin) 100 unit/mL injection 10 mL 10     Sig: INJECT 14 UNITS EVERY MORNING AND 3 UNITS EVERY EVENING FOR DIABETES CONTROL

## 2022-01-09 RX ORDER — INSULIN GLARGINE 100 [IU]/ML
INJECTION, SOLUTION SUBCUTANEOUS
Qty: 10 ML | Status: SHIPPED | OUTPATIENT
Start: 2022-01-09 | End: 2022-04-11

## 2022-01-09 RX ORDER — BLOOD SUGAR DIAGNOSTIC
STRIP MISCELLANEOUS
Qty: 300 STRIP | Refills: 3 | Status: SHIPPED | OUTPATIENT
Start: 2022-01-09

## 2022-01-09 RX ORDER — LANCETS
EACH MISCELLANEOUS
Qty: 300 EACH | Refills: 5 | Status: SHIPPED | OUTPATIENT
Start: 2022-01-09 | End: 2022-04-11 | Stop reason: SDUPTHER

## 2022-01-09 RX ORDER — INSULIN GLARGINE 100 [IU]/ML
INJECTION, SOLUTION SUBCUTANEOUS
Qty: 10 ML | Refills: 10 | Status: SHIPPED | OUTPATIENT
Start: 2022-01-09

## 2022-01-11 DIAGNOSIS — L65.9 ALOPECIA: ICD-10-CM

## 2022-01-11 RX ORDER — LOSARTAN POTASSIUM 50 MG/1
50 TABLET ORAL DAILY
Qty: 90 TABLET | Refills: 1 | Status: SHIPPED | OUTPATIENT
Start: 2022-01-11 | End: 2022-09-06

## 2022-01-11 RX ORDER — SPIRONOLACTONE 25 MG/1
25 TABLET ORAL DAILY
Qty: 90 TABLET | Refills: 3 | Status: SHIPPED | OUTPATIENT
Start: 2022-01-11

## 2022-01-11 NOTE — TELEPHONE ENCOUNTER
[facility] WOULD LIKE FOR THE NURSE TO GIVE THEM A CALL TO DISUSS SEVERAL MEDICATION FOR THIS PATIENT, [phone]

## 2022-01-11 NOTE — TELEPHONE ENCOUNTER
I called [facility] and they wanted to confirm that she is taking 14 units of lantus instead of the 11 units. Patient told them she is taking 14 units and 11 unites was the old prescription. I informed them it was fine to take the 14 units in the morning and 3 in the evening. They also need refills for her spironolactone and losartan. She is transferring pharmacies. PCP: Juan Daniel Chanel MD     Last appt: 10/12/2021   Future Appointments   Date Time Provider Seg[facility] Wood   4/11/2022  8:30 AM Juan Daniel Chanel MD [facility] AMB        Requested Prescriptions     Pending Prescriptions Disp Refills    spironolactone (ALDACTONE) 25 mg tablet 90 Tablet 3     Sig: Take 1 Tablet by mouth daily.  losartan (COZAAR) 50 mg tablet 90 Tablet 1     Sig: Take 1 Tablet by mouth daily.

## 2022-03-18 PROBLEM — I10 ESSENTIAL HYPERTENSION: Status: ACTIVE | Noted: 2021-04-12

## 2022-03-19 PROBLEM — H25.811 COMBINED FORMS OF AGE-RELATED CATARACT OF RIGHT EYE: Status: ACTIVE | Noted: 2020-08-11

## 2022-04-02 LAB
ALBUMIN SERPL-MCNC: 4.2 G/DL (ref 3.7–4.7)
ALBUMIN/GLOB SERPL: 2.3 {RATIO} (ref 1.2–2.2)
ALP SERPL-CCNC: 54 IU/L (ref 44–121)
ALT SERPL-CCNC: 13 IU/L (ref 0–32)
AST SERPL-CCNC: 21 IU/L (ref 0–40)
BILIRUB SERPL-MCNC: 0.4 MG/DL (ref 0–1.2)
BUN SERPL-MCNC: 24 MG/DL (ref 8–27)
BUN/CREAT SERPL: 31 (ref 12–28)
CALCIUM SERPL-MCNC: 9.4 MG/DL (ref 8.7–10.3)
CHLORIDE SERPL-SCNC: 103 MMOL/L (ref 96–106)
CHOLEST SERPL-MCNC: 155 MG/DL (ref 100–199)
CO2 SERPL-SCNC: 24 MMOL/L (ref 20–29)
CREAT SERPL-MCNC: 0.77 MG/DL (ref 0.57–1)
EGFR: 79 ML/MIN/1.73
EST. AVERAGE GLUCOSE BLD GHB EST-MCNC: 160 MG/DL
GLOBULIN SER CALC-MCNC: 1.8 G/DL (ref 1.5–4.5)
GLUCOSE SERPL-MCNC: 131 MG/DL (ref 65–99)
HBA1C MFR BLD: 7.2 % (ref 4.8–5.6)
HCV AB S/CO SERPL IA: <0.1 S/CO RATIO (ref 0–0.9)
HCV AB SERPL QL IA: NORMAL
HDLC SERPL-MCNC: 72 MG/DL
LDLC SERPL CALC-MCNC: 71 MG/DL (ref 0–99)
POTASSIUM SERPL-SCNC: 4.4 MMOL/L (ref 3.5–5.2)
PROT SERPL-MCNC: 6 G/DL (ref 6–8.5)
SODIUM SERPL-SCNC: 143 MMOL/L (ref 134–144)
TRIGL SERPL-MCNC: 57 MG/DL (ref 0–149)
VLDLC SERPL CALC-MCNC: 12 MG/DL (ref 5–40)

## 2022-04-11 ENCOUNTER — OFFICE VISIT (OUTPATIENT)
Dept: INTERNAL MEDICINE CLINIC | Age: 77
End: 2022-04-11
Payer: MEDICARE

## 2022-04-11 VITALS
SYSTOLIC BLOOD PRESSURE: 133 MMHG | HEIGHT: 66 IN | BODY MASS INDEX: 23.85 KG/M2 | DIASTOLIC BLOOD PRESSURE: 77 MMHG | TEMPERATURE: 97.5 F | WEIGHT: 148.4 LBS | OXYGEN SATURATION: 98 % | HEART RATE: 80 BPM | RESPIRATION RATE: 16 BRPM

## 2022-04-11 DIAGNOSIS — E78.2 MIXED HYPERLIPIDEMIA: ICD-10-CM

## 2022-04-11 DIAGNOSIS — R53.82 CHRONIC FATIGUE: ICD-10-CM

## 2022-04-11 DIAGNOSIS — I10 ESSENTIAL HYPERTENSION: ICD-10-CM

## 2022-04-11 DIAGNOSIS — E11.9 TYPE 2 DIABETES MELLITUS WITHOUT COMPLICATION, WITH LONG-TERM CURRENT USE OF INSULIN (HCC): Primary | ICD-10-CM

## 2022-04-11 DIAGNOSIS — R68.89 OTHER GENERAL SYMPTOMS AND SIGNS: ICD-10-CM

## 2022-04-11 DIAGNOSIS — Z79.4 TYPE 2 DIABETES MELLITUS WITHOUT COMPLICATION, WITH LONG-TERM CURRENT USE OF INSULIN (HCC): Primary | ICD-10-CM

## 2022-04-11 PROCEDURE — G8754 DIAS BP LESS 90: HCPCS | Performed by: INTERNAL MEDICINE

## 2022-04-11 PROCEDURE — 1090F PRES/ABSN URINE INCON ASSESS: CPT | Performed by: INTERNAL MEDICINE

## 2022-04-11 PROCEDURE — 1101F PT FALLS ASSESS-DOCD LE1/YR: CPT | Performed by: INTERNAL MEDICINE

## 2022-04-11 PROCEDURE — G8536 NO DOC ELDER MAL SCRN: HCPCS | Performed by: INTERNAL MEDICINE

## 2022-04-11 PROCEDURE — G8510 SCR DEP NEG, NO PLAN REQD: HCPCS | Performed by: INTERNAL MEDICINE

## 2022-04-11 PROCEDURE — 99214 OFFICE O/P EST MOD 30 MIN: CPT | Performed by: INTERNAL MEDICINE

## 2022-04-11 PROCEDURE — G8752 SYS BP LESS 140: HCPCS | Performed by: INTERNAL MEDICINE

## 2022-04-11 PROCEDURE — 3051F HG A1C>EQUAL 7.0%<8.0%: CPT | Performed by: INTERNAL MEDICINE

## 2022-04-11 PROCEDURE — G8427 DOCREV CUR MEDS BY ELIG CLIN: HCPCS | Performed by: INTERNAL MEDICINE

## 2022-04-11 PROCEDURE — G8420 CALC BMI NORM PARAMETERS: HCPCS | Performed by: INTERNAL MEDICINE

## 2022-04-11 RX ORDER — LANCETS
EACH MISCELLANEOUS
Qty: 300 EACH | Refills: 5 | Status: SHIPPED | OUTPATIENT
Start: 2022-04-11

## 2022-04-11 NOTE — PROGRESS NOTES
Carolyn Cowan  Identified pt with two pt identifiers(name and ). Chief Complaint   Patient presents with    Diabetes     Room 2A //     Cholesterol Problem    Blood Pressure Check       Reviewed record In preparation for visit and have obtained necessary documentation. 1. Have you been to the ER, urgent care clinic or hospitalized since your last visit? No     2. Have you seen or consulted any other health care providers outside of the [address] [place] [address] since your last visit? Include any pap smears or colon screening. No    Patient has an advance directive. Vitals reviewed with provider.     Health Maintenance reviewed:     Health Maintenance Due   Topic    COVID-19 Vaccine (3 - Booster for Pfizer series)    Foot Exam Q1           Wt Readings from Last 3 Encounters:   22 148 lb 6.4 oz (67.3 kg)   10/12/21 150 lb 12.8 oz (68.4 kg)   21 149 lb (67.6 kg)        Temp Readings from Last 3 Encounters:   22 97.5 °F (36.4 °C) (Oral)   10/12/21 97.7 °F (36.5 °C) (Oral)   21 97.8 °F (36.6 °C) (Oral)        BP Readings from Last 3 Encounters:   22 133/77   10/12/21 121/74   21 120/70        Pulse Readings from Last 3 Encounters:   22 80   10/12/21 80   21 72        Vitals:    22 0829   BP: 133/77   Pulse: 80   Resp: 16   Temp: 97.5 °F (36.4 °C)   TempSrc: Oral   SpO2: 98%   Weight: 148 lb 6.4 oz (67.3 kg)   Height: 5' 6\" (1.676 m)   PainSc:   0 - No pain          Learning Assessment:   :       Learning Assessment 2016   PRIMARY LEARNER Patient Patient   HIGHEST LEVEL OF EDUCATION - PRIMARY LEARNER  > 4 YEARS OF COLLEGE -   BARRIERS PRIMARY LEARNER NONE -   CO-LEARNER CAREGIVER No -   PRIMARY LANGUAGE ENGLISH ENGLISH   LEARNER PREFERENCE PRIMARY READING DEMONSTRATION   ANSWERED BY Patient patient   RELATIONSHIP SELF SELF        Depression Screening:   :       3 most recent PHQ Screens 2022   Little interest or pleasure in doing things Not at all   Feeling down, depressed, irritable, or hopeless Not at all   Total Score PHQ 2 0        Fall Risk Assessment:   :       Fall Risk Assessment, last 12 mths 10/12/2021   Able to walk? Yes   Fall in past 12 months? 0   Do you feel unsteady? 0   Are you worried about falling 0        Abuse Screening:   :       Abuse Screening Questionnaire 10/12/2021 7/2/2020 9/5/2019 8/20/2018 8/24/2016 2/10/2015   Do you ever feel afraid of your partner? N N N N N N   Are you in a relationship with someone who physically or mentally threatens you? N N N N N N   Is it safe for you to go home?  Y Y Y Y Y Y        ADL Screening:   :       ADL Assessment 10/12/2021   Feeding yourself No Help Needed   Getting from bed to chair No Help Needed   Getting dressed No Help Needed   Bathing or showering No Help Needed   Walk across the room (includes cane/walker) No Help Needed   Using the telphone No Help Needed   Taking your medications No Help Needed   Preparing meals No Help Needed   Managing money (expenses/bills) No Help Needed   Moderately strenuous housework (laundry) No Help Needed   Shopping for personal items (toiletries/medicines) No Help Needed   Shopping for groceries No Help Needed   Driving No Help Needed   Climbing a flight of stairs No Help Needed   Getting to places beyond walking distances No Help Needed

## 2022-04-11 NOTE — PROGRESS NOTES
HPI  Ms. Landon Salamanca is a 64y.o. year old female, she is seen today for follow up DM, cholesterol, HTN. Glucose has been  normally in morning, higher in evenings. May be around 160 at bedtime, drops overnight. Has been walking for exercise, also doing online exercise videos few times per week - using light weights. Denies chest pain, shortness of breath, dizziness/lightheadedness, headaches, visual changes, edema. Says energy is lower, wakes a few times at night to urinate. Drinks fluids all day, no caffeine after noon. No dysphagia or lumps in neck. Chief Complaint   Patient presents with    Diabetes     Room 2A //     Cholesterol Problem    Blood Pressure Check        Prior to Admission medications    Medication Sig Start Date End Date Taking? Authorizing Provider   lancets (OneTouch UltraSoft Lancets) misc USE TO TEST BLOOD SUGAR THREE TIMES A DAY 4/11/22  Yes Daren Sanchez MD   spironolactone (ALDACTONE) 25 mg tablet Take 1 Tablet by mouth daily. 1/11/22  Yes Daren Sanchez MD   losartan (COZAAR) 50 mg tablet Take 1 Tablet by mouth daily. 1/11/22  Yes Daren Sanchez MD   glucose blood VI test strips (OneTouch Ultra Test) strip USE TO TEST BLOOD SUGAR THREE TIMES DAILY 1/9/22  Yes Daren Sanchez MD   insulin glargine (Lantus U-100 Insulin) 100 unit/mL injection INJECT 14 UNITS EVERY MORNING AND 3 UNITS EVERY EVENING FOR DIABETES CONTROL 1/9/22  Yes Daren Sanchez MD   BD Insulin Syringe Ultra-Fine 0.5 mL 31 gauge x 5/16\" syrg USE AS DIRECTED TWICE DAILY 10/25/21  Yes Daren Snachez MD   rosuvastatin (CRESTOR) 5 mg tablet Take 1 Tablet by mouth nightly. 10/12/21  Yes Daren Sanchez MD   metFORMIN (GLUCOPHAGE) 500 mg tablet TAKE 2 TABLETS BY MOUTH EVERY MORNING AND TAKE 1 TABLET BY MOUTH AT NOON AND DINNERTIME 8/17/21  Yes Daren Sanchez MD   meloxicam (MOBIC) 7.5 mg tablet Take 1-2 Tablets by mouth daily as needed for Pain.  7/19/21  Yes Eddie Susan Serna MD   Insulin Syringe-Needle U-100 (BD Insulin Syringe Ultra-Fine) 0.3 mL 31 gauge x 5/16\" syrg USE AS DIRECTED TWICE DAILY 7/19/21  Yes Kirby Davis MD   Calcium Carbonate-Vit D3-Min (CALTRATE 600+D PLUS MINERALS) 600 mg (1,500 mg)-400 unit Chew Take  by mouth daily. 6/27/12  Yes Yesenia Calixto MD   multivitamins-minerals-lutein (CENTRUM SILVER) Tab Take  by mouth. Yes Provider, Historical   cholecalciferol, vitamin d3, (VITAMIN D) 1,000 unit tablet Take  by mouth daily. Yes Provider, Historical   insulin glargine (Lantus U-100 Insulin) 100 unit/mL injection INJECT 11 UNITS EVERY MORNI NG AND 3 UNITS EVERY EVENI NG FOR DIABETES CONTROL  Patient not taking: Reported on 4/11/2022 1/9/22 4/11/22  Kirby Davis MD   lancets (OneTouch UltraSoft Lancets) misc USE TO TEST BLOOD SUGAR THREE TIMES A DAY 1/9/22 4/11/22  Kirby Davis MD   moxifloxacin (Vigamox) 0.5 % ophthalmic solution Administer 1 Drop to right eye three (3) times daily. 4/11/22  Provider, Historical   aspirin delayed-release 81 mg tablet Take 1 Tab by mouth daily. 7/2/20 4/11/22  Javed Oneal NP         Allergies   Allergen Reactions    Lisinopril Cough    Simvastatin Myalgia         REVIEW OF SYSTEMS:  Per HPI    PHYSICAL EXAM:  Visit Vitals  /77 (BP 1 Location: Left upper arm, BP Patient Position: Sitting, BP Cuff Size: Adult)   Pulse 80   Temp 97.5 °F (36.4 °C) (Oral)   Resp 16   Ht 5' 6\" (1.676 m)   Wt 148 lb 6.4 oz (67.3 kg)   LMP  (LMP Unknown)   SpO2 98%   BMI 23.95 kg/m²     Constitutional: Appears well-developed and well-nourished. No distress. HENT:   Head: Normocephalic and atraumatic. Eyes: No scleral icterus. Neck: no lad, no tm, supple   Cardiovascular: Normal S1/S2, regular rhythm. No murmurs, rubs, or gallops. Pulmonary/Chest: Effort normal and breath sounds normal. No respiratory distress. No wheezes, rhonchi, or rales. Ext: No edema. Neurological: Alert. Psychiatric: Normal mood and affect. Behavior is normal.    DM foot exam: 2+ pedal pulses, no lesions, sensation intact to monofilament b/l , decreased to vibratory on left     Lab Results   Component Value Date/Time    Sodium 143 04/01/2022 07:59 AM    Potassium 4.4 04/01/2022 07:59 AM    Chloride 103 04/01/2022 07:59 AM    CO2 24 04/01/2022 07:59 AM    Anion gap 9 11/01/2010 08:07 AM    Glucose 131 (H) 04/01/2022 07:59 AM    BUN 24 04/01/2022 07:59 AM    Creatinine 0.77 04/01/2022 07:59 AM    BUN/Creatinine ratio 31 (H) 04/01/2022 07:59 AM    GFR est AA 79 10/04/2021 08:05 AM    GFR est non-AA 69 10/04/2021 08:05 AM    Calcium 9.4 04/01/2022 07:59 AM    Bilirubin, total 0.4 04/01/2022 07:59 AM    Alk. phosphatase 54 04/01/2022 07:59 AM    Protein, total 6.0 04/01/2022 07:59 AM    Albumin 4.2 04/01/2022 07:59 AM    Globulin 2.4 11/01/2010 08:07 AM    A-G Ratio 2.3 (H) 04/01/2022 07:59 AM    ALT (SGPT) 13 04/01/2022 07:59 AM     Lab Results   Component Value Date/Time    Hemoglobin A1c 7.2 (H) 04/01/2022 07:59 AM    Hemoglobin A1c 7.2 (H) 10/04/2021 08:05 AM    Hemoglobin A1c 6.9 (H) 04/02/2021 08:32 AM      Lab Results   Component Value Date/Time    Cholesterol, total 155 04/01/2022 07:59 AM    HDL Cholesterol 72 04/01/2022 07:59 AM    LDL, calculated 71 04/01/2022 07:59 AM    LDL, calculated 84 02/26/2020 07:49 AM    VLDL, calculated 12 04/01/2022 07:59 AM    VLDL, calculated 16 02/26/2020 07:49 AM    Triglyceride 57 04/01/2022 07:59 AM    CHOL/HDL Ratio 1.5 11/01/2010 08:07 AM          ASSESSMENT/PLAN  Diagnoses and all orders for this visit:    1. Type 2 diabetes mellitus without complication, with long-term current use of insulin (HCC)  -     lancets (OneTouch UltraSoft Lancets) misc; USE TO TEST BLOOD SUGAR THREE TIMES A DAY  -      DIABETES FOOT EXAM    2. Mixed hyperlipidemia    3. Essential hypertension    4. Chronic fatigue  -     TSH 3RD GENERATION; Future  -     T4, FREE;  Future  -     CBC WITH AUTOMATED DIFF; Future    5. Other general symptoms and signs  -     VITAMIN B12; Future    DM at goal - continue current medications , patient wants it lower and will work on diet/exercise, recheck a1c in 3 mos in office during visit  bp at goal - continue current medications   For fatigue, check labs, avoid fluids at least a few hours before bed      Health Maintenance Due   Topic Date Due    COVID-19 Vaccine (3 - Booster for Lei Peter series) 07/19/2021        Follow-up and Dispositions    · Return in about 3 months (around 7/11/2022) for dm - poc a1c. Reviewed plan of care. Patient has provided input and agrees with goals. The nurse provided the patient and/or family with advanced directive information if needed and encouraged the patient to provide a copy to the office when available.

## 2022-04-14 LAB
BASOPHILS # BLD AUTO: 0.1 X10E3/UL (ref 0–0.2)
BASOPHILS NFR BLD AUTO: 1 %
EOSINOPHIL # BLD AUTO: 0.2 X10E3/UL (ref 0–0.4)
EOSINOPHIL NFR BLD AUTO: 3 %
ERYTHROCYTE [DISTWIDTH] IN BLOOD BY AUTOMATED COUNT: 12.8 % (ref 11.7–15.4)
HCT VFR BLD AUTO: 40.3 % (ref 34–46.6)
HGB BLD-MCNC: 13.2 G/DL (ref 11.1–15.9)
IMM GRANULOCYTES # BLD AUTO: 0 X10E3/UL (ref 0–0.1)
IMM GRANULOCYTES NFR BLD AUTO: 0 %
LYMPHOCYTES # BLD AUTO: 2 X10E3/UL (ref 0.7–3.1)
LYMPHOCYTES NFR BLD AUTO: 28 %
MCH RBC QN AUTO: 29.9 PG (ref 26.6–33)
MCHC RBC AUTO-ENTMCNC: 32.8 G/DL (ref 31.5–35.7)
MCV RBC AUTO: 91 FL (ref 79–97)
MONOCYTES # BLD AUTO: 0.5 X10E3/UL (ref 0.1–0.9)
MONOCYTES NFR BLD AUTO: 6 %
NEUTROPHILS # BLD AUTO: 4.4 X10E3/UL (ref 1.4–7)
NEUTROPHILS NFR BLD AUTO: 62 %
PLATELET # BLD AUTO: 296 X10E3/UL (ref 150–450)
RBC # BLD AUTO: 4.42 X10E6/UL (ref 3.77–5.28)
T4 FREE SERPL-MCNC: 1.12 NG/DL (ref 0.82–1.77)
TSH SERPL DL<=0.005 MIU/L-ACNC: 1.91 UIU/ML (ref 0.45–4.5)
VIT B12 SERPL-MCNC: 367 PG/ML (ref 232–1245)
WBC # BLD AUTO: 7.2 X10E3/UL (ref 3.4–10.8)

## 2022-07-26 ENCOUNTER — OFFICE VISIT (OUTPATIENT)
Dept: INTERNAL MEDICINE CLINIC | Age: 77
End: 2022-07-26
Payer: MEDICARE

## 2022-07-26 VITALS
DIASTOLIC BLOOD PRESSURE: 76 MMHG | RESPIRATION RATE: 16 BRPM | BODY MASS INDEX: 23.72 KG/M2 | HEIGHT: 66 IN | TEMPERATURE: 97.7 F | HEART RATE: 70 BPM | OXYGEN SATURATION: 97 % | WEIGHT: 147.6 LBS | SYSTOLIC BLOOD PRESSURE: 129 MMHG

## 2022-07-26 DIAGNOSIS — E11.9 TYPE 2 DIABETES MELLITUS WITHOUT COMPLICATION, WITH LONG-TERM CURRENT USE OF INSULIN (HCC): Primary | ICD-10-CM

## 2022-07-26 DIAGNOSIS — I10 ESSENTIAL HYPERTENSION: ICD-10-CM

## 2022-07-26 DIAGNOSIS — G47.9 SLEEPING DIFFICULTY: ICD-10-CM

## 2022-07-26 DIAGNOSIS — Z79.4 TYPE 2 DIABETES MELLITUS WITHOUT COMPLICATION, WITH LONG-TERM CURRENT USE OF INSULIN (HCC): Primary | ICD-10-CM

## 2022-07-26 DIAGNOSIS — E78.2 MIXED HYPERLIPIDEMIA: ICD-10-CM

## 2022-07-26 LAB — HBA1C MFR BLD HPLC: 6.7 %

## 2022-07-26 PROCEDURE — G8427 DOCREV CUR MEDS BY ELIG CLIN: HCPCS | Performed by: INTERNAL MEDICINE

## 2022-07-26 PROCEDURE — 99214 OFFICE O/P EST MOD 30 MIN: CPT | Performed by: INTERNAL MEDICINE

## 2022-07-26 PROCEDURE — G8754 DIAS BP LESS 90: HCPCS | Performed by: INTERNAL MEDICINE

## 2022-07-26 PROCEDURE — 1101F PT FALLS ASSESS-DOCD LE1/YR: CPT | Performed by: INTERNAL MEDICINE

## 2022-07-26 PROCEDURE — 1090F PRES/ABSN URINE INCON ASSESS: CPT | Performed by: INTERNAL MEDICINE

## 2022-07-26 PROCEDURE — G8752 SYS BP LESS 140: HCPCS | Performed by: INTERNAL MEDICINE

## 2022-07-26 PROCEDURE — G8420 CALC BMI NORM PARAMETERS: HCPCS | Performed by: INTERNAL MEDICINE

## 2022-07-26 PROCEDURE — 1123F ACP DISCUSS/DSCN MKR DOCD: CPT | Performed by: INTERNAL MEDICINE

## 2022-07-26 PROCEDURE — G8432 DEP SCR NOT DOC, RNG: HCPCS | Performed by: INTERNAL MEDICINE

## 2022-07-26 PROCEDURE — 3044F HG A1C LEVEL LT 7.0%: CPT | Performed by: INTERNAL MEDICINE

## 2022-07-26 PROCEDURE — G8536 NO DOC ELDER MAL SCRN: HCPCS | Performed by: INTERNAL MEDICINE

## 2022-07-26 NOTE — PROGRESS NOTES
HPI  Ms. Josué Wallis is a 64y.o. year old female, she is seen today for follow up DM. A1C is 6.7 today - last check was 7.2 3 mos ago. Has been using exercise bike some, trying to be more active. Says changed her dinner a bit to have more vegetables. Says she doesn't have much energy. Denies chest pain, shortness of breath, dizziness/lightheadedness. Says she will often wake tired in morning. On a good night will get about 7 hours of sleep. Short nap - sometime just closes eyes - about 20 min in afternoon - sets timer but doesn't feel refreshed. Mood is good, not down, sad or depressed. Feeling tired like this for a couple of years. Occasionally takes melatonin and sleep is better. Only takes once every 1-3 weeks. No caffeine. Chief Complaint   Patient presents with    Diabetes     Room 2A //         Prior to Admission medications    Medication Sig Start Date End Date Taking? Authorizing Provider   lancets (OneTouch UltraSoft Lancets) misc USE TO TEST BLOOD SUGAR THREE TIMES A DAY 4/11/22  Yes Graciela Rome MD   spironolactone (ALDACTONE) 25 mg tablet Take 1 Tablet by mouth daily. 1/11/22  Yes Graciela Rome MD   losartan (COZAAR) 50 mg tablet Take 1 Tablet by mouth daily. 1/11/22  Yes Graciela Rome MD   glucose blood VI test strips (OneTouch Ultra Test) strip USE TO TEST BLOOD SUGAR THREE TIMES DAILY 1/9/22  Yes Graciela Rome MD   insulin glargine (Lantus U-100 Insulin) 100 unit/mL injection INJECT 14 UNITS EVERY MORNING AND 3 UNITS EVERY EVENING FOR DIABETES CONTROL 1/9/22  Yes Graciela Rome MD   BD Insulin Syringe Ultra-Fine 0.5 mL 31 gauge x 5/16\" syrg USE AS DIRECTED TWICE DAILY 10/25/21  Yes Graciela Rome MD   rosuvastatin (CRESTOR) 5 mg tablet Take 1 Tablet by mouth nightly.  10/12/21  Yes Graciela Rome MD   metFORMIN (GLUCOPHAGE) 500 mg tablet TAKE 2 TABLETS BY MOUTH EVERY MORNING AND TAKE 1 TABLET BY MOUTH AT NOON AND DINNERTIME 8/17/21 Yes Jeimy Shirley MD   meloxicam (MOBIC) 7.5 mg tablet Take 1-2 Tablets by mouth daily as needed for Pain. 7/19/21  Yes Jeimy Shirley MD   Insulin Syringe-Needle U-100 (BD Insulin Syringe Ultra-Fine) 0.3 mL 31 gauge x 5/16\" syrg USE AS DIRECTED TWICE DAILY 7/19/21  Yes Jeimy Shirley MD   calcium carb/vit D3/minerals (Calcium Carbonate-Vit D3-Min) 600 mg (1,500 mg)-400 unit chew Take  by mouth daily. 6/27/12  Yes Angely Vasquez MD   multivitamins-minerals-lutein (MEN'S CENTRUM SILVER WITH LUTEIN) tab tablet Take  by mouth. Yes Provider, Historical   cholecalciferol (VITAMIN D3) (1000 Units /25 mcg) tablet Take  by mouth daily. Yes Provider, Historical         Allergies   Allergen Reactions    Lisinopril Cough    Simvastatin Myalgia         REVIEW OF SYSTEMS:  Per HPI    PHYSICAL EXAM:  Visit Vitals  /76 (BP 1 Location: Left upper arm, BP Patient Position: Sitting, BP Cuff Size: Adult)   Pulse 70   Temp 97.7 °F (36.5 °C) (Oral)   Resp 16   Ht 5' 6\" (1.676 m)   Wt 147 lb 9.6 oz (67 kg)   LMP  (LMP Unknown)   SpO2 97%   BMI 23.82 kg/m²     Constitutional: Appears well-developed and well-nourished. No distress. HENT:   Head: Normocephalic and atraumatic. Eyes: No scleral icterus. Cardiovascular: Normal S1/S2, regular rhythm. No murmurs, rubs, or gallops. Pulmonary/Chest: Effort normal and breath sounds normal. No respiratory distress. No wheezes, rhonchi, or rales. Ext: No edema. Neurological: Alert. Psychiatric: Normal mood and affect.  Behavior is normal.     Lab Results   Component Value Date/Time    Sodium 143 04/01/2022 07:59 AM    Potassium 4.4 04/01/2022 07:59 AM    Chloride 103 04/01/2022 07:59 AM    CO2 24 04/01/2022 07:59 AM    Anion gap 9 11/01/2010 08:07 AM    Glucose 131 (H) 04/01/2022 07:59 AM    BUN 24 04/01/2022 07:59 AM    Creatinine 0.77 04/01/2022 07:59 AM    BUN/Creatinine ratio 31 (H) 04/01/2022 07:59 AM    GFR est AA 79 10/04/2021 08:05 AM    GFR est non-AA 69 10/04/2021 08:05 AM    Calcium 9.4 04/01/2022 07:59 AM    Bilirubin, total 0.4 04/01/2022 07:59 AM    Alk. phosphatase 54 04/01/2022 07:59 AM    Protein, total 6.0 04/01/2022 07:59 AM    Albumin 4.2 04/01/2022 07:59 AM    Globulin 2.4 11/01/2010 08:07 AM    A-G Ratio 2.3 (H) 04/01/2022 07:59 AM    ALT (SGPT) 13 04/01/2022 07:59 AM     Lab Results   Component Value Date/Time    Hemoglobin A1c 7.2 (H) 04/01/2022 07:59 AM    Hemoglobin A1c 7.2 (H) 10/04/2021 08:05 AM    Hemoglobin A1c 6.9 (H) 04/02/2021 08:32 AM      Lab Results   Component Value Date/Time    Cholesterol, total 155 04/01/2022 07:59 AM    HDL Cholesterol 72 04/01/2022 07:59 AM    LDL, calculated 71 04/01/2022 07:59 AM    LDL, calculated 84 02/26/2020 07:49 AM    VLDL, calculated 12 04/01/2022 07:59 AM    VLDL, calculated 16 02/26/2020 07:49 AM    Triglyceride 57 04/01/2022 07:59 AM    CHOL/HDL Ratio 1.5 11/01/2010 08:07 AM          ASSESSMENT/PLAN  Diagnoses and all orders for this visit:    1. Type 2 diabetes mellitus without complication, with long-term current use of insulin (HCC)  -     AMB POC HEMOGLOBIN A1C  -     HEMOGLOBIN A1C WITH EAG; Future    2. Essential hypertension    3. Mixed hyperlipidemia  -     METABOLIC PANEL, COMPREHENSIVE; Future  -     LIPID PANEL; Future    4. Sleeping difficulty    Excellent diabetes control, continue current regimen. BP at goal, continue current regimen. Excessive fatigue, likely multifactorial.  Her B12 is low normal and she will add B12 1000 mcg daily. Info given on sleep hygiene. She may take melatonin for several nights in a row to help reset sleep cycle. There are no preventive care reminders to display for this patient. Follow-up and Dispositions    Return in about 3 months (around 10/26/2022) for bp, chol, dm, labs prior. Reviewed plan of care. Patient has provided input and agrees with goals.      The nurse provided the patient and/or family with advanced directive information if needed and encouraged the patient to provide a copy to the office when available.

## 2022-07-26 NOTE — PROGRESS NOTES
Carolyn Cowan  Identified pt with two pt identifiers(name and ). Chief Complaint   Patient presents with    Diabetes     Room 2A //        Reviewed record In preparation for visit and have obtained necessary documentation. 1. Have you been to the ER, urgent care clinic or hospitalized since your last visit? No     2. Have you seen or consulted any other health care providers outside of the [address] [place] [address] since your last visit? Include any pap smears or colon screening. No    Patient has an advance directive. Vitals reviewed with provider.     Health Maintenance reviewed:     Health Maintenance Due   Topic    COVID-19 Vaccine (3 - Booster for Pfizer series)          Wt Readings from Last 3 Encounters:   22 147 lb 9.6 oz (67 kg)   22 148 lb 6.4 oz (67.3 kg)   10/12/21 150 lb 12.8 oz (68.4 kg)        Temp Readings from Last 3 Encounters:   22 97.5 °F (36.4 °C) (Oral)   10/12/21 97.7 °F (36.5 °C) (Oral)   21 97.8 °F (36.6 °C) (Oral)        BP Readings from Last 3 Encounters:   22 133/77   10/12/21 121/74   21 120/70        Pulse Readings from Last 3 Encounters:   22 80   10/12/21 80   21 72        Vitals:    22 0822   Resp: 16   Weight: 147 lb 9.6 oz (67 kg)   Height: 5' 6\" (1.676 m)   PainSc:   0 - No pain          Learning Assessment:   :       Learning Assessment 2016   PRIMARY LEARNER Patient Patient   HIGHEST LEVEL OF EDUCATION - PRIMARY LEARNER  > 4 YEARS OF COLLEGE -   BARRIERS PRIMARY LEARNER NONE -   [address] CAREGIVER No -   PRIMARY LANGUAGE ENGLISH ENGLISH   LEARNER PREFERENCE PRIMARY READING DEMONSTRATION   ANSWERED BY Patient patient   RELATIONSHIP SELF SELF        Depression Screening:   :       3 most recent PHQ Screens 2022   Little interest or pleasure in doing things Not at all   Feeling down, depressed, irritable, or hopeless Not at all   Total Score PHQ 2 0        Fall Risk Assessment:   : Fall Risk Assessment, last 12 mths 10/12/2021   Able to walk? Yes   Fall in past 12 months? 0   Do you feel unsteady? 0   Are you worried about falling 0        Abuse Screening:   :       Abuse Screening Questionnaire 10/12/2021 7/2/2020 9/5/2019 8/20/2018 8/24/2016 2/10/2015   Do you ever feel afraid of your partner? N N N N N N   Are you in a relationship with someone who physically or mentally threatens you? N N N N N N   Is it safe for you to go home?  Y Y Y Y Y Y        ADL Screening:   :       ADL Assessment 10/12/2021   Feeding yourself No Help Needed   Getting from bed to chair No Help Needed   Getting dressed No Help Needed   Bathing or showering No Help Needed   Walk across the room (includes cane/walker) No Help Needed   Using the telphone No Help Needed   Taking your medications No Help Needed   Preparing meals No Help Needed   Managing money (expenses/bills) No Help Needed   Moderately strenuous housework (laundry) No Help Needed   Shopping for personal items (toiletries/medicines) No Help Needed   Shopping for groceries No Help Needed   Driving No Help Needed   Climbing a flight of stairs No Help Needed   Getting to places beyond walking distances No Help Needed

## 2022-08-23 RX ORDER — PEN NEEDLE, DIABETIC 29 G X1/2"
NEEDLE, DISPOSABLE MISCELLANEOUS
Qty: 100 EACH | Refills: 5 | Status: SHIPPED | OUTPATIENT
Start: 2022-08-23

## 2022-09-06 DIAGNOSIS — E11.9 TYPE 2 DIABETES MELLITUS WITHOUT COMPLICATION, WITH LONG-TERM CURRENT USE OF INSULIN (HCC): ICD-10-CM

## 2022-09-06 DIAGNOSIS — Z79.4 TYPE 2 DIABETES MELLITUS WITHOUT COMPLICATION, WITH LONG-TERM CURRENT USE OF INSULIN (HCC): ICD-10-CM

## 2022-09-06 RX ORDER — METFORMIN HYDROCHLORIDE 500 MG/1
TABLET ORAL
Qty: 360 TABLET | Refills: 0 | Status: SHIPPED | OUTPATIENT
Start: 2022-09-06

## 2022-09-06 RX ORDER — LOSARTAN POTASSIUM 50 MG/1
TABLET ORAL
Qty: 90 TABLET | Refills: 0 | Status: SHIPPED | OUTPATIENT
Start: 2022-09-06

## 2022-10-27 LAB
ALBUMIN SERPL-MCNC: 4.3 G/DL (ref 3.7–4.7)
ALBUMIN/GLOB SERPL: 2.2 {RATIO} (ref 1.2–2.2)
ALP SERPL-CCNC: 57 IU/L (ref 44–121)
ALT SERPL-CCNC: 16 IU/L (ref 0–32)
AST SERPL-CCNC: 21 IU/L (ref 0–40)
BILIRUB SERPL-MCNC: 0.3 MG/DL (ref 0–1.2)
BUN SERPL-MCNC: 14 MG/DL (ref 8–27)
BUN/CREAT SERPL: 19 (ref 12–28)
CALCIUM SERPL-MCNC: 9.1 MG/DL (ref 8.7–10.3)
CHLORIDE SERPL-SCNC: 105 MMOL/L (ref 96–106)
CHOLEST SERPL-MCNC: 145 MG/DL (ref 100–199)
CO2 SERPL-SCNC: 24 MMOL/L (ref 20–29)
CREAT SERPL-MCNC: 0.75 MG/DL (ref 0.57–1)
EGFR: 81 ML/MIN/1.73
EST. AVERAGE GLUCOSE BLD GHB EST-MCNC: 154 MG/DL
GLOBULIN SER CALC-MCNC: 2 G/DL (ref 1.5–4.5)
GLUCOSE SERPL-MCNC: 111 MG/DL (ref 70–99)
HBA1C MFR BLD: 7 % (ref 4.8–5.6)
HDLC SERPL-MCNC: 62 MG/DL
LDLC SERPL CALC-MCNC: 71 MG/DL (ref 0–99)
POTASSIUM SERPL-SCNC: 4.4 MMOL/L (ref 3.5–5.2)
PROT SERPL-MCNC: 6.3 G/DL (ref 6–8.5)
SODIUM SERPL-SCNC: 142 MMOL/L (ref 134–144)
TRIGL SERPL-MCNC: 59 MG/DL (ref 0–149)
VLDLC SERPL CALC-MCNC: 12 MG/DL (ref 5–40)

## 2022-10-31 ENCOUNTER — OFFICE VISIT (OUTPATIENT)
Dept: INTERNAL MEDICINE CLINIC | Age: 77
End: 2022-10-31
Payer: MEDICARE

## 2022-10-31 ENCOUNTER — PATIENT MESSAGE (OUTPATIENT)
Dept: INTERNAL MEDICINE CLINIC | Age: 77
End: 2022-10-31

## 2022-10-31 VITALS
WEIGHT: 149 LBS | BODY MASS INDEX: 23.95 KG/M2 | DIASTOLIC BLOOD PRESSURE: 74 MMHG | HEIGHT: 66 IN | HEART RATE: 77 BPM | OXYGEN SATURATION: 97 % | RESPIRATION RATE: 12 BRPM | SYSTOLIC BLOOD PRESSURE: 125 MMHG | TEMPERATURE: 97.7 F

## 2022-10-31 DIAGNOSIS — Z79.4 TYPE 2 DIABETES MELLITUS WITHOUT COMPLICATION, WITH LONG-TERM CURRENT USE OF INSULIN (HCC): ICD-10-CM

## 2022-10-31 DIAGNOSIS — Z23 NEEDS FLU SHOT: ICD-10-CM

## 2022-10-31 DIAGNOSIS — Z00.00 MEDICARE ANNUAL WELLNESS VISIT, SUBSEQUENT: Primary | ICD-10-CM

## 2022-10-31 DIAGNOSIS — B35.1 ONYCHOMYCOSIS: ICD-10-CM

## 2022-10-31 DIAGNOSIS — E78.2 MIXED HYPERLIPIDEMIA: ICD-10-CM

## 2022-10-31 DIAGNOSIS — I10 ESSENTIAL HYPERTENSION: ICD-10-CM

## 2022-10-31 DIAGNOSIS — E11.9 TYPE 2 DIABETES MELLITUS WITHOUT COMPLICATION, WITH LONG-TERM CURRENT USE OF INSULIN (HCC): ICD-10-CM

## 2022-10-31 PROCEDURE — 1101F PT FALLS ASSESS-DOCD LE1/YR: CPT | Performed by: INTERNAL MEDICINE

## 2022-10-31 PROCEDURE — G8536 NO DOC ELDER MAL SCRN: HCPCS | Performed by: INTERNAL MEDICINE

## 2022-10-31 PROCEDURE — 3078F DIAST BP <80 MM HG: CPT | Performed by: INTERNAL MEDICINE

## 2022-10-31 PROCEDURE — 1090F PRES/ABSN URINE INCON ASSESS: CPT | Performed by: INTERNAL MEDICINE

## 2022-10-31 PROCEDURE — G8420 CALC BMI NORM PARAMETERS: HCPCS | Performed by: INTERNAL MEDICINE

## 2022-10-31 PROCEDURE — G8427 DOCREV CUR MEDS BY ELIG CLIN: HCPCS | Performed by: INTERNAL MEDICINE

## 2022-10-31 PROCEDURE — 1123F ACP DISCUSS/DSCN MKR DOCD: CPT | Performed by: INTERNAL MEDICINE

## 2022-10-31 PROCEDURE — 99213 OFFICE O/P EST LOW 20 MIN: CPT | Performed by: INTERNAL MEDICINE

## 2022-10-31 PROCEDURE — G8754 DIAS BP LESS 90: HCPCS | Performed by: INTERNAL MEDICINE

## 2022-10-31 PROCEDURE — G8510 SCR DEP NEG, NO PLAN REQD: HCPCS | Performed by: INTERNAL MEDICINE

## 2022-10-31 PROCEDURE — 90694 VACC AIIV4 NO PRSRV 0.5ML IM: CPT | Performed by: INTERNAL MEDICINE

## 2022-10-31 PROCEDURE — 3074F SYST BP LT 130 MM HG: CPT | Performed by: INTERNAL MEDICINE

## 2022-10-31 PROCEDURE — G8752 SYS BP LESS 140: HCPCS | Performed by: INTERNAL MEDICINE

## 2022-10-31 PROCEDURE — G0439 PPPS, SUBSEQ VISIT: HCPCS | Performed by: INTERNAL MEDICINE

## 2022-10-31 PROCEDURE — G0008 ADMIN INFLUENZA VIRUS VAC: HCPCS | Performed by: INTERNAL MEDICINE

## 2022-10-31 PROCEDURE — 3051F HG A1C>EQUAL 7.0%<8.0%: CPT | Performed by: INTERNAL MEDICINE

## 2022-10-31 RX ORDER — CICLOPIROX 80 MG/ML
SOLUTION TOPICAL
Qty: 6.6 ML | Refills: 1 | Status: SHIPPED | OUTPATIENT
Start: 2022-10-31

## 2022-10-31 NOTE — PROGRESS NOTES
HPI  Ms. Luli Skelton is a 76y.o. year old female, she is seen today for follow up dm, htn, awv.   DM - mostly in range glucose - was 191 this morning - rare highs - but went to birthday party for friend, ate different foods - did have cake around 4pm - glucose was 160 when went to bed and then had snack    No chest pain, sob, dizziness. Walking for exercise, stays active. A1C is at goal for age at 7.0. Left 2nd toe thickened distally - normally grows out but hasn't. Chief Complaint   Patient presents with    Annual Wellness Visit    Diabetes    Hypertension    Cholesterol Problem        Prior to Admission medications    Medication Sig Start Date End Date Taking? Authorizing Provider   efinaconazole (JUBLIA) siri topical solution Apply to affected toenail(s) once daily for 48 weeks. 10/31/22  Yes Foster Schultz MD   losartan (COZAAR) 50 mg tablet TAKE ONE TABLET BY MOUTH ONE TIME DAILY 9/6/22  Yes Foster Schultz MD   metFORMIN (GLUCOPHAGE) 500 mg tablet TAKE 2 TABLETS IN THE MORNING AND 1 TABLET AT NOON AND DINNERTIME 9/6/22  Yes Foster Schultz MD   BD Insulin Syringe Ultra-Fine 0.5 mL 31 gauge x 5/16\" syrg USE AS DIRECTED TWICE DAILY 8/23/22  Yes Foster Schultz MD   lancets (OneTouch UltraSoft Lancets) misc USE TO TEST BLOOD SUGAR THREE TIMES A DAY 4/11/22  Yes Foster Schultz MD   spironolactone (ALDACTONE) 25 mg tablet Take 1 Tablet by mouth daily. 1/11/22  Yes Foster Schultz MD   glucose blood VI test strips (OneTouch Ultra Test) strip USE TO TEST BLOOD SUGAR THREE TIMES DAILY 1/9/22  Yes Foster Schultz MD   insulin glargine (Lantus U-100 Insulin) 100 unit/mL injection INJECT 14 UNITS EVERY MORNING AND 3 UNITS EVERY EVENING FOR DIABETES CONTROL 1/9/22  Yes Foster Schultz MD   rosuvastatin (CRESTOR) 5 mg tablet Take 1 Tablet by mouth nightly.  10/12/21  Yes Foster Schultz MD   meloxicam (MOBIC) 7.5 mg tablet Take 1-2 Tablets by mouth daily as needed for Pain. 7/19/21  Yes Belem Cano MD   Insulin Syringe-Needle U-100 (BD Insulin Syringe Ultra-Fine) 0.3 mL 31 gauge x 5/16\" syrg USE AS DIRECTED TWICE DAILY 7/19/21  Yes Belem Cano MD   calcium carb/vit D3/minerals (Calcium Carbonate-Vit D3-Min) 600 mg (1,500 mg)-400 unit chew Take  by mouth daily. 6/27/12  Yes Maxwell Napoles MD   multivitamins-minerals-lutein (MEN'S CENTRUM SILVER WITH LUTEIN) tab tablet Take  by mouth. Yes Provider, Historical   cholecalciferol (VITAMIN D3) (1000 Units /25 mcg) tablet Take  by mouth daily. Yes Provider, Historical         Allergies   Allergen Reactions    Lisinopril Cough    Simvastatin Myalgia         REVIEW OF SYSTEMS:  Per HPI    PHYSICAL EXAM:  Visit Vitals  /74 (BP 1 Location: Left upper arm, BP Patient Position: Sitting)   Pulse 77   Temp 97.7 °F (36.5 °C) (Oral)   Resp 12   Ht 5' 6\" (1.676 m)   Wt 149 lb (67.6 kg)   LMP  (LMP Unknown)   SpO2 97%   BMI 24.05 kg/m²     Constitutional: Appears well-developed and well-nourished. No distress. HENT:   Head: Normocephalic and atraumatic. Eyes: No scleral icterus. Neck: no lad, no tm, supple   Cardiovascular: Normal S1/S2, regular rhythm. No murmurs, rubs, or gallops. Pulmonary/Chest: Effort normal and breath sounds normal. No respiratory distress. No wheezes, rhonchi, or rales. Feet: left 2nd toenail thickened distally with subungual debris  Ext: No edema. Neurological: Alert. Psychiatric: Normal mood and affect.  Behavior is normal.     Lab Results   Component Value Date/Time    Sodium 142 10/26/2022 08:09 AM    Potassium 4.4 10/26/2022 08:09 AM    Chloride 105 10/26/2022 08:09 AM    CO2 24 10/26/2022 08:09 AM    Anion gap 9 11/01/2010 08:07 AM    Glucose 111 (H) 10/26/2022 08:09 AM    BUN 14 10/26/2022 08:09 AM    Creatinine 0.75 10/26/2022 08:09 AM    BUN/Creatinine ratio 19 10/26/2022 08:09 AM    GFR est AA 79 10/04/2021 08:05 AM    GFR est non-AA 69 10/04/2021 08:05 AM Calcium 9.1 10/26/2022 08:09 AM    Bilirubin, total 0.3 10/26/2022 08:09 AM    Alk. phosphatase 57 10/26/2022 08:09 AM    Protein, total 6.3 10/26/2022 08:09 AM    Albumin 4.3 10/26/2022 08:09 AM    Globulin 2.4 11/01/2010 08:07 AM    A-G Ratio 2.2 10/26/2022 08:09 AM    ALT (SGPT) 16 10/26/2022 08:09 AM     Lab Results   Component Value Date/Time    Hemoglobin A1c 7.0 (H) 10/26/2022 08:09 AM    Hemoglobin A1c 7.2 (H) 04/01/2022 07:59 AM    Hemoglobin A1c 7.2 (H) 10/04/2021 08:05 AM      Lab Results   Component Value Date/Time    Cholesterol, total 145 10/26/2022 08:09 AM    HDL Cholesterol 62 10/26/2022 08:09 AM    LDL, calculated 71 10/26/2022 08:09 AM    LDL, calculated 84 02/26/2020 07:49 AM    VLDL, calculated 12 10/26/2022 08:09 AM    VLDL, calculated 16 02/26/2020 07:49 AM    Triglyceride 59 10/26/2022 08:09 AM    CHOL/HDL Ratio 1.5 11/01/2010 08:07 AM          ASSESSMENT/PLAN  Diagnoses and all orders for this visit:    1. Type 2 diabetes mellitus without complication, with long-term current use of insulin (HCC)  -     MICROALBUMIN, UR, RAND W/ MICROALB/CREAT RATIO; Future    2. Needs flu shot  -     INFLUENZA, FLUAD, (AGE 65 Y+), IM, PF, 0.5 ML    3. Essential hypertension    4. Mixed hyperlipidemia    5. Medicare annual wellness visit, subsequent    6. Onychomycosis  -     efinaconazole (JUBLIA) siri topical solution; Apply to affected toenail(s) once daily for 48 weeks. A1c at goal - continue current medications   Bp at goal - continue current medications   Lipids at goal - on statin - check yearly  Start jublia for #6 - if too expensive switch to [address] Maintenance Due   Topic Date Due    COVID-19 Vaccine (5 - Booster for Pfizer series) 07/22/2022    Flu Vaccine (1) 08/01/2022    MICROALBUMIN Q1  10/12/2022        Follow-up and Dispositions    Return in about 6 months (around 4/30/2023) for dm - poc a1c. Reviewed plan of care. Patient has provided input and agrees with goals. The nurse provided the patient and/or family with advanced directive information if needed and encouraged the patient to provide a copy to the office when available. This is the Subsequent Medicare Annual Wellness Exam, performed 12 months or more after the Initial AWV or the last Subsequent AWV    I have reviewed the patient's medical history in detail and updated the computerized patient record. Assessment/Plan   Education and counseling provided:  Are appropriate based on today's review and evaluation    1. Type 2 diabetes mellitus without complication, with long-term current use of insulin (HCC)  -     MICROALBUMIN, UR, RAND W/ MICROALB/CREAT RATIO; Future  2. Needs flu shot  -     INFLUENZA, FLUAD, (AGE 65 Y+), IM, PF, 0.5 ML  3. Essential hypertension  4. Mixed hyperlipidemia  5. Medicare annual wellness visit, subsequent  6. Onychomycosis  -     efinaconazole (JUBLIA) siri topical solution; Apply to affected toenail(s) once daily for 48 weeks. , Normal, Disp-4 mL, R-3       Depression Risk Factor Screening     3 most recent PHQ Screens 10/31/2022   Little interest or pleasure in doing things Not at all   Feeling down, depressed, irritable, or hopeless Not at all   Total Score PHQ 2 0       Alcohol & Drug Abuse Risk Screen    Do you average more than 1 drink per night or more than 7 drinks a week:  No    On any one occasion in the past three months have you have had more than 3 drinks containing alcohol:  No          Functional Ability and Level of Safety    Hearing: Hearing is good. Activities of Daily Living: The home contains: grab bars  Patient does total self care      Ambulation: with no difficulty     Fall Risk:  Fall Risk Assessment, last 12 mths 10/31/2022   Able to walk? Yes   Fall in past 12 months? 0   Do you feel unsteady?  0   Are you worried about falling 0      Abuse Screen:  Patient is not abused       Cognitive Screening    Has your family/caregiver stated any concerns about your memory: no     Cognitive Screening: recall 3/3    Health Maintenance Due     Health Maintenance Due   Topic Date Due    COVID-19 Vaccine (5 - Booster for Pfizer series) 07/22/2022    Flu Vaccine (1) 08/01/2022    MICROALBUMIN Q1  10/12/2022       Patient Care Team   Patient Care Team:  Afshin Lerner MD as PCP - General (Internal Medicine Physician)  Afshin Lerner MD as PCP - [facility] Empaneled Provider    History     Patient Active Problem List   Diagnosis Code    Diabetes mellitus [facility]) E11.9    Colon polyps K63.5    Compression fracture of thoracic vertebra ([facility] [address] S22.000A    Right hip pain M25.551    Hyperlipidemia E78.5    Tinea unguium B35.1    Skin cancer C44.90    Hip pain, left M25.552    Osteoporosis M81.0    Dense breast R92.2    Combined forms of age-related cataract of right eye H25.811    Essential hypertension I10     Past Medical History:   Diagnosis Date    Abnormal EKG 07/14/2020    pt getting workup with Dr. Garcia [place]     lower back    Cancer [facility])     skin     Depression     mild with occasional use of prozac    Diabetes ([facility] [address]     Nausea & vomiting     Psychiatric disorder     depression      Past Surgical History:   Procedure Laterality Date    COLONOSCOPY N/A 11/16/2016    COLONOSCOPY performed by Anant Vaughan MD at [facility] ENDOSCOPY    COLONOSCOPY,DIAGNOSTIC  11/16/2016         HX BREAST BIOPSY Left     benign cyst removed    HX CATARACT REMOVAL Left 08/2020    HX ORTHOPAEDIC      right hip arthroscopy - Dr. Leena Lerner [address] [facility]     paps  wnl fibroids    HX TONSILLECTOMY      HX VEIN STRIPPING Right     vein removed x1    MS COLSC FLX W/REMOVAL LESION BY HOT BX FORCEPS  8/22/2011          Current Outpatient Medications   Medication Sig Dispense Refill    efinaconazole (JUBLIA) siri topical solution Apply to affected toenail(s) once daily for 48 weeks.  4 mL 3    losartan (COZAAR) 50 mg tablet TAKE ONE TABLET BY MOUTH ONE TIME DAILY 90 Tablet 0    metFORMIN (GLUCOPHAGE) 500 mg tablet TAKE 2 TABLETS IN THE MORNING AND 1 TABLET AT NOON AND DINNERTIME 360 Tablet 0    BD Insulin Syringe Ultra-Fine 0.5 mL 31 gauge x 5/16\" syrg USE AS DIRECTED TWICE DAILY 100 Each 5    lancets (OneTouch UltraSoft Lancets) misc USE TO TEST BLOOD SUGAR THREE TIMES A  Each 5    spironolactone (ALDACTONE) 25 mg tablet Take 1 Tablet by mouth daily. 90 Tablet 3    glucose blood VI test strips (OneTouch Ultra Test) strip USE TO TEST BLOOD SUGAR THREE TIMES DAILY 300 Strip 3    insulin glargine (Lantus U-100 Insulin) 100 unit/mL injection INJECT 14 UNITS EVERY MORNING AND 3 UNITS EVERY EVENING FOR DIABETES CONTROL 10 mL 10    rosuvastatin (CRESTOR) 5 mg tablet Take 1 Tablet by mouth nightly. 90 Tablet 1    meloxicam (MOBIC) 7.5 mg tablet Take 1-2 Tablets by mouth daily as needed for Pain. 30 Tablet 1    Insulin Syringe-Needle U-100 (BD Insulin Syringe Ultra-Fine) 0.3 mL 31 gauge x 5/16\" syrg USE AS DIRECTED TWICE DAILY 100 Syringe 5    calcium carb/vit D3/minerals (Calcium Carbonate-Vit D3-Min) 600 mg (1,500 mg)-400 unit chew Take  by mouth daily. multivitamins-minerals-lutein (MEN'S CENTRUM SILVER WITH LUTEIN) tab tablet Take  by mouth. cholecalciferol (VITAMIN D3) (1000 Units /25 mcg) tablet Take  by mouth daily. Allergies   Allergen Reactions    Lisinopril Cough    Simvastatin Myalgia       Family History   Problem Relation Age of Onset    Hypertension Brother      Social History     Tobacco Use    Smoking status: Never    Smokeless tobacco: Never   Substance Use Topics    Alcohol use:  Yes     Alcohol/week: 3.0 standard drinks     Types: 3 Glasses of wine per week         Van Gallardo MD

## 2022-10-31 NOTE — PATIENT INSTRUCTIONS
Vaccine Information Statement    Influenza (Flu) Vaccine (Inactivated or Recombinant): What You Need to Know    Many vaccine information statements are available in Vietnamese and other languages. See www.immunize.org/vis. Hojas de información sobre vacunas están disponibles en español y en muchos otros idiomas. Visite www.immunize.org/vis. 1. Why get vaccinated? Influenza vaccine can prevent influenza (flu). Flu is a contagious disease that spreads around the [facility] every year, usually between October and May. Anyone can get the flu, but it is more dangerous for some people. Infants and young children, people 72 years and older, pregnant people, and people with certain health conditions or a weakened immune system are at greatest risk of flu complications. Pneumonia, bronchitis, sinus infections, and ear infections are examples of flu-related complications. If you have a medical condition, such as heart disease, cancer, or diabetes, flu can make it worse. Flu can cause fever and chills, sore throat, muscle aches, fatigue, cough, headache, and runny or stuffy nose. Some people may have vomiting and diarrhea, though this is more common in children than adults. In an average year, thousands of people in the [facility] die from flu, and many more are hospitalized. Flu vaccine prevents millions of illnesses and flu-related visits to the doctor each year. 2. Influenza vaccines     CDC recommends everyone 6 months and older get vaccinated every flu season. Children 6 months through 6years of age may need 2 doses during a single flu season. Everyone else needs only 1 dose each flu season. It takes about 2 weeks for protection to develop after vaccination. There are many flu viruses, and they are always changing. Each year a new flu vaccine is made to protect against the influenza viruses believed to be likely to cause disease in the upcoming flu season.  Even when the vaccine doesnt exactly match these viruses, it may still provide some protection. Influenza vaccine does not cause flu. Influenza vaccine may be given at the same time as other vaccines. 3. Talk with your health care provider    Tell your vaccination provider if the person getting the vaccine:  Has had an allergic reaction after a previous dose of influenza vaccine, or has any severe, life-threatening allergies   Has ever had Guillain-Barré Syndrome (also called GBS)    In some cases, your health care provider may decide to postpone influenza vaccination until a future visit. Influenza vaccine can be administered at any time during pregnancy. People who are or will be pregnant during influenza season should receive inactivated influenza vaccine. People with minor illnesses, such as a cold, may be vaccinated. People who are moderately or severely ill should usually wait until they recover before getting influenza vaccine. Your health care provider can give you more information. 4. Risks of a vaccine reaction    Soreness, redness, and swelling where the shot is given, fever, muscle aches, and headache can happen after influenza vaccination. There may be a very small increased risk of Guillain-Barré Syndrome (GBS) after inactivated influenza vaccine (the flu shot). Berta Fernando children who get the flu shot along with pneumococcal vaccine (PCV13) and/or DTaP vaccine at the same time might be slightly more likely to have a seizure caused by fever. Tell your health care provider if a child who is getting flu vaccine has ever had a seizure. People sometimes faint after medical procedures, including vaccination. Tell your provider if you feel dizzy or have vision changes or ringing in the ears. As with any medicine, there is a very remote chance of a vaccine causing a severe allergic reaction, other serious injury, or death. 5. What if there is a serious problem?     An allergic reaction could occur after the vaccinated person leaves the clinic. If you see signs of a severe allergic reaction (hives, swelling of the face and throat, difficulty breathing, a fast heartbeat, dizziness, or weakness), call 9-1-1 and get the person to the nearest hospital.    For other signs that concern you, call your health care provider. Adverse reactions should be reported to the Vaccine Adverse Event Reporting System (VAERS). Your health care provider will usually file this report, or you can do it yourself. Visit the VAERS website at www.vaers. [facility].gov or call 2-796.839.4074. VAERS is only for reporting reactions, and VAERS staff members do not give medical advice. 6. The National Vaccine Injury Compensation Program    The [facility] Vaccine Injury Compensation Program (VICP) is a federal program that was created to compensate people who may have been injured by certain vaccines. Claims regarding alleged injury or death due to vaccination have a time limit for filing, which may be as short as two years. Visit the VICP website at www.[facility].gov/vaccinecompensation or call 7-919.894.9002 to learn about the program and about filing a claim. 7. How can I learn more? Ask your health care provider. Call your local or state health department. Visit the website of the Food and Drug Administration (FDA) for vaccine package inserts and additional information at www.fda.gov/vaccines-blood-biologics/vaccines. Contact the Centers for Disease Control and Prevention (CDC): Call 2-568.718.1615 (5-494-MIY-INFO) or  Visit CDCs influenza website at www.cdc.gov/flu. Vaccine Information Statement   Inactivated Influenza Vaccine   8/6/2021  42 BRIAN Nupur Durant [id number]   Department of Health and Human Services  Centers for Disease Control and Prevention    Office Use Only      Medicare Wellness Visit, Female     The best way to live healthy is to have a lifestyle where you eat a well-balanced diet, exercise regularly, limit alcohol use, and quit all forms of tobacco/nicotine, if applicable. Regular preventive services are another way to keep healthy. Preventive services (vaccines, screening tests, monitoring & exams) can help personalize your care plan, which helps you manage your own care. Screening tests can find health problems at the earliest stages, when they are easiest to treat. [facility] follows the current, evidence-based guidelines published by the [facility] Rash[facility] ([facility]) when recommending preventive services for our patients. Because we follow these guidelines, sometimes recommendations change over time as research supports it. (For example, mammograms used to be recommended annually. Even though Medicare will still pay for an annual mammogram, the newer guidelines recommend a mammogram every two years for women of average risk). Of course, you and your doctor may decide to screen more often for some diseases, based on your risk and your co-morbidities (chronic disease you are already diagnosed with). Preventive services for you include:  - Medicare offers their members a free annual wellness visit, which is time for you and your primary care provider to discuss and plan for your preventive service needs. Take advantage of this benefit every year!  -All adults over the age of 72 should receive the recommended pneumonia vaccines. Current USPSTF guidelines recommend a series of two vaccines for the best pneumonia protection.   -All adults should have a flu vaccine yearly and a tetanus vaccine every 10 years.   -All adults age 48 and older should receive the shingles vaccines (series of two vaccines).       -All adults age 38-68 who are overweight should have a diabetes screening test once every three years.   -All adults born between [birth year] and 1967 should be screened once for Hepatitis C.  -Other screening tests and preventive services for persons with diabetes include: an eye exam to screen for diabetic retinopathy, a kidney function test, a foot exam, and stricter control over your cholesterol.   -Cardiovascular screening for adults with routine risk involves an electrocardiogram (ECG) at intervals determined by your doctor.   -Colorectal cancer screenings should be done for adults age 54-65 with no increased risk factors for colorectal cancer. There are a number of acceptable methods of screening for this type of cancer. Each test has its own benefits and drawbacks. Discuss with your doctor what is most appropriate for you during your annual wellness visit. The different tests include: colonoscopy (considered the best screening method), a fecal occult blood test, a fecal DNA test, and sigmoidoscopy.    -A bone mass density test is recommended when a woman turns 65 to screen for osteoporosis. This test is only recommended one time, as a screening. Some providers will use this same test as a disease monitoring tool if you already have osteoporosis. -Breast cancer screenings are recommended every other year for women of normal risk, age 54-69.  -Cervical cancer screenings for women over age 72 are only recommended with certain risk factors.      Here is a list of your current Health Maintenance items (your personalized list of preventive services) with a due date:  Health Maintenance Due   Topic Date Due    COVID-19 Vaccine (5 - Booster for [employer] Medical series) 07/22/2022    Yearly Flu Vaccine (1) 08/01/2022    Albumin Urine Test  10/12/2022

## 2022-10-31 NOTE — TELEPHONE ENCOUNTER
From: Jl Parr  To: Loretta Blankenship MD  Sent: 10/31/2022 2:30 PM EDT  Subject: Noah Ngo prescription    Dr. Jose Hernandez that you prescribed for me this morning has a copay of $200. Is there a less expensive alternative that I can use instead of Jublia?     Thank you,    Jl Parr

## 2022-10-31 NOTE — PROGRESS NOTES
Carolyn Cowan  Identified pt with two pt identifiers(name and ). Chief Complaint   Patient presents with    Annual Wellness Visit    Diabetes    Hypertension    Cholesterol Problem       Reviewed record In preparation for visit and have obtained necessary documentation. 1. Have you been to the ER, urgent care clinic or hospitalized since your last visit? No     2. Have you seen or consulted any other health care providers outside of the [address] [place] [address] since your last visit? Include any pap smears or colon screening. No    Vitals reviewed with provider. Health Maintenance reviewed: After verbal order read back of Dr Timo Mario, patient received High Dose Flu Shot (Adjuvanted Fluad) in right deltoid. Drea Kaufman 47: [id number] Lot: [id number] Exp: 2023. Patient tolerated procedure without complaints and received VIS.       Health Maintenance Due   Topic    COVID-19 Vaccine (5 - Booster for Pfizer series)    Flu Vaccine (1)    MICROALBUMIN Q1     Medicare Yearly Exam           Wt Readings from Last 3 Encounters:   10/31/22 149 lb (67.6 kg)   22 147 lb 9.6 oz (67 kg)   22 148 lb 6.4 oz (67.3 kg)        Temp Readings from Last 3 Encounters:   10/31/22 97.7 °F (36.5 °C) (Oral)   22 97.7 °F (36.5 °C) (Oral)   22 97.5 °F (36.4 °C) (Oral)        BP Readings from Last 3 Encounters:   10/31/22 125/74   22 129/76   22 133/77        Pulse Readings from Last 3 Encounters:   10/31/22 77   22 70   22 80        Vitals:    10/31/22 0927   BP: 125/74   Pulse: 77   Resp: 12   Temp: 97.7 °F (36.5 °C)   TempSrc: Oral   SpO2: 97%   Weight: 149 lb (67.6 kg)   Height: 5' 6\" (1.676 m)   PainSc:   0 - No pain          Learning Assessment:   :       Learning Assessment 2016   PRIMARY LEARNER Patient Patient   HIGHEST LEVEL OF EDUCATION - PRIMARY LEARNER  > 4 YEARS OF COLLEGE -   BARRIERS PRIMARY LEARNER NONE -   CO-LEARNER CAREGIVER No -   PRIMARY LANGUAGE ENGLISH ENGLISH   LEARNER PREFERENCE PRIMARY READING DEMONSTRATION   ANSWERED BY Patient patient   RELATIONSHIP SELF SELF        Depression Screening:   :       3 most recent PHQ Screens 10/31/2022   Little interest or pleasure in doing things Not at all   Feeling down, depressed, irritable, or hopeless Not at all   Total Score PHQ 2 0        Fall Risk Assessment:   :       Fall Risk Assessment, last 12 mths 10/31/2022   Able to walk? Yes   Fall in past 12 months? 0   Do you feel unsteady? 0   Are you worried about falling 0        Abuse Screening:   :       Abuse Screening Questionnaire 10/31/2022 10/12/2021 7/2/2020 9/5/2019 8/20/2018 8/24/2016 2/10/2015   Do you ever feel afraid of your partner? - Jose Luis Hernandezon   Are you in a relationship with someone who physically or mentally threatens you? N N N N N N N   Is it safe for you to go home?  Y Y Y Y Y Y Y        ADL Screening:   :       ADL Assessment 10/31/2022   Feeding yourself No Help Needed   Getting from bed to chair No Help Needed   Getting dressed No Help Needed   Bathing or showering No Help Needed   Walk across the room (includes cane/walker) No Help Needed   Using the telphone No Help Needed   Taking your medications No Help Needed   Preparing meals No Help Needed   Managing money (expenses/bills) No Help Needed   Moderately strenuous housework (laundry) No Help Needed   Shopping for personal items (toiletries/medicines) No Help Needed   Shopping for groceries No Help Needed   Driving No Help Needed   Climbing a flight of stairs No Help Needed   Getting to places beyond walking distances No Help Needed

## 2022-11-01 LAB
ALBUMIN/CREAT UR: 4 MG/G CREAT (ref 0–29)
CREAT UR-MCNC: 134.6 MG/DL
MICROALBUMIN UR-MCNC: 5 UG/ML

## 2022-12-05 DIAGNOSIS — E11.9 TYPE 2 DIABETES MELLITUS WITHOUT COMPLICATION, WITH LONG-TERM CURRENT USE OF INSULIN (HCC): ICD-10-CM

## 2022-12-05 DIAGNOSIS — Z79.4 TYPE 2 DIABETES MELLITUS WITHOUT COMPLICATION, WITH LONG-TERM CURRENT USE OF INSULIN (HCC): ICD-10-CM

## 2022-12-05 RX ORDER — LOSARTAN POTASSIUM 50 MG/1
TABLET ORAL
Qty: 90 TABLET | Refills: 0 | Status: SHIPPED | OUTPATIENT
Start: 2022-12-05

## 2022-12-05 RX ORDER — METFORMIN HYDROCHLORIDE 500 MG/1
TABLET ORAL
Qty: 360 TABLET | Refills: 0 | Status: SHIPPED | OUTPATIENT
Start: 2022-12-05

## 2023-01-05 ENCOUNTER — TRANSCRIBE ORDER (OUTPATIENT)
Dept: SCHEDULING | Age: 78
End: 2023-01-05

## 2023-01-05 DIAGNOSIS — Z12.31 SCREENING MAMMOGRAM FOR HIGH-RISK PATIENT: Primary | ICD-10-CM

## 2023-02-01 ENCOUNTER — HOSPITAL ENCOUNTER (OUTPATIENT)
Dept: MAMMOGRAPHY | Age: 78
Discharge: HOME | End: 2023-02-01
Attending: INTERNAL MEDICINE
Payer: MEDICARE

## 2023-02-01 DIAGNOSIS — Z12.31 SCREENING MAMMOGRAM FOR HIGH-RISK PATIENT: ICD-10-CM

## 2023-02-01 PROCEDURE — 77063 BREAST TOMOSYNTHESIS BI: CPT

## 2023-02-12 DIAGNOSIS — Z79.4 TYPE 2 DIABETES MELLITUS WITHOUT COMPLICATION, WITH LONG-TERM CURRENT USE OF INSULIN (HCC): ICD-10-CM

## 2023-02-12 DIAGNOSIS — E11.9 TYPE 2 DIABETES MELLITUS WITHOUT COMPLICATION, WITH LONG-TERM CURRENT USE OF INSULIN (HCC): ICD-10-CM

## 2023-02-13 RX ORDER — BLOOD SUGAR DIAGNOSTIC
STRIP MISCELLANEOUS
Qty: 300 STRIP | Refills: 0 | Status: SHIPPED | OUTPATIENT
Start: 2023-02-13

## 2023-03-03 DIAGNOSIS — E11.9 TYPE 2 DIABETES MELLITUS WITHOUT COMPLICATION, WITH LONG-TERM CURRENT USE OF INSULIN (HCC): ICD-10-CM

## 2023-03-03 DIAGNOSIS — Z79.4 TYPE 2 DIABETES MELLITUS WITHOUT COMPLICATION, WITH LONG-TERM CURRENT USE OF INSULIN (HCC): ICD-10-CM

## 2023-03-03 RX ORDER — METFORMIN HYDROCHLORIDE 500 MG/1
TABLET ORAL
Qty: 360 TABLET | Refills: 0 | Status: SHIPPED | OUTPATIENT
Start: 2023-03-03

## 2023-03-17 DIAGNOSIS — L65.9 ALOPECIA: ICD-10-CM

## 2023-03-17 DIAGNOSIS — E11.9 TYPE 2 DIABETES MELLITUS WITHOUT COMPLICATION, WITH LONG-TERM CURRENT USE OF INSULIN (HCC): ICD-10-CM

## 2023-03-17 DIAGNOSIS — Z79.4 TYPE 2 DIABETES MELLITUS WITHOUT COMPLICATION, WITH LONG-TERM CURRENT USE OF INSULIN (HCC): ICD-10-CM

## 2023-03-17 RX ORDER — LANCETS
EACH MISCELLANEOUS
Qty: 300 EACH | Refills: 0 | Status: SHIPPED | OUTPATIENT
Start: 2023-03-17

## 2023-03-17 RX ORDER — SPIRONOLACTONE 25 MG/1
TABLET ORAL
Qty: 90 TABLET | Refills: 0 | Status: SHIPPED | OUTPATIENT
Start: 2023-03-17

## 2023-03-17 RX ORDER — LOSARTAN POTASSIUM 50 MG/1
TABLET ORAL
Qty: 90 TABLET | Refills: 0 | Status: SHIPPED | OUTPATIENT
Start: 2023-03-17

## 2023-04-27 ENCOUNTER — ANESTHESIA (OUTPATIENT)
Dept: ENDOSCOPY | Age: 78
End: 2023-04-27
Payer: MEDICARE

## 2023-04-27 ENCOUNTER — ANESTHESIA EVENT (OUTPATIENT)
Dept: ENDOSCOPY | Age: 78
End: 2023-04-27
Payer: MEDICARE

## 2023-04-27 ENCOUNTER — HOSPITAL ENCOUNTER (OUTPATIENT)
Age: 78
Setting detail: OUTPATIENT SURGERY
Discharge: HOME | End: 2023-04-27
Attending: INTERNAL MEDICINE | Admitting: INTERNAL MEDICINE
Payer: MEDICARE

## 2023-04-27 VITALS
RESPIRATION RATE: 17 BRPM | TEMPERATURE: 97.9 F | HEART RATE: 74 BPM | OXYGEN SATURATION: 98 % | SYSTOLIC BLOOD PRESSURE: 119 MMHG | HEIGHT: 66 IN | BODY MASS INDEX: 23.54 KG/M2 | WEIGHT: 146.5 LBS | DIASTOLIC BLOOD PRESSURE: 48 MMHG

## 2023-04-27 LAB
GLUCOSE BLD STRIP.AUTO-MCNC: 166 MG/DL (ref 65–117)
GLUCOSE BLD STRIP.AUTO-MCNC: 193 MG/DL (ref 65–117)
SERVICE CMNT-IMP: ABNORMAL
SERVICE CMNT-IMP: ABNORMAL

## 2023-04-27 PROCEDURE — 74011000250 HC RX REV CODE- 250: Performed by: NURSE ANESTHETIST, CERTIFIED REGISTERED

## 2023-04-27 PROCEDURE — 74011250636 HC RX REV CODE- 250/636: Performed by: NURSE ANESTHETIST, CERTIFIED REGISTERED

## 2023-04-27 PROCEDURE — 77030013992 HC SNR POLYP ENDOSC BSC -B: Performed by: INTERNAL MEDICINE

## 2023-04-27 PROCEDURE — 74011250636 HC RX REV CODE- 250/636: Performed by: INTERNAL MEDICINE

## 2023-04-27 PROCEDURE — 88305 TISSUE EXAM BY PATHOLOGIST: CPT

## 2023-04-27 PROCEDURE — 82962 GLUCOSE BLOOD TEST: CPT

## 2023-04-27 PROCEDURE — 2709999900 HC NON-CHARGEABLE SUPPLY: Performed by: INTERNAL MEDICINE

## 2023-04-27 PROCEDURE — 76040000019: Performed by: INTERNAL MEDICINE

## 2023-04-27 PROCEDURE — 76060000031 HC ANESTHESIA FIRST 0.5 HR: Performed by: INTERNAL MEDICINE

## 2023-04-27 RX ORDER — PROPOFOL 10 MG/ML
INJECTION, EMULSION INTRAVENOUS AS NEEDED
Status: DISCONTINUED | OUTPATIENT
Start: 2023-04-27 | End: 2023-04-27 | Stop reason: HOSPADM

## 2023-04-27 RX ORDER — SODIUM CHLORIDE 9 MG/ML
75 INJECTION, SOLUTION INTRAVENOUS CONTINUOUS
Status: DISCONTINUED | OUTPATIENT
Start: 2023-04-27 | End: 2023-04-27 | Stop reason: HOSPADM

## 2023-04-27 RX ORDER — EPINEPHRINE 0.1 MG/ML
1 INJECTION INTRACARDIAC; INTRAVENOUS
Status: DISCONTINUED | OUTPATIENT
Start: 2023-04-27 | End: 2023-04-27 | Stop reason: HOSPADM

## 2023-04-27 RX ORDER — FLUMAZENIL 0.1 MG/ML
0.2 INJECTION INTRAVENOUS
Status: DISCONTINUED | OUTPATIENT
Start: 2023-04-27 | End: 2023-04-27 | Stop reason: HOSPADM

## 2023-04-27 RX ORDER — LIDOCAINE HYDROCHLORIDE 20 MG/ML
INJECTION, SOLUTION EPIDURAL; INFILTRATION; INTRACAUDAL; PERINEURAL AS NEEDED
Status: DISCONTINUED | OUTPATIENT
Start: 2023-04-27 | End: 2023-04-27 | Stop reason: HOSPADM

## 2023-04-27 RX ORDER — DEXTROMETHORPHAN/PSEUDOEPHED 2.5-7.5/.8
1.2 DROPS ORAL
Status: DISCONTINUED | OUTPATIENT
Start: 2023-04-27 | End: 2023-04-27 | Stop reason: HOSPADM

## 2023-04-27 RX ORDER — SODIUM CHLORIDE 0.9 % (FLUSH) 0.9 %
5-40 SYRINGE (ML) INJECTION EVERY 8 HOURS
Status: DISCONTINUED | OUTPATIENT
Start: 2023-04-27 | End: 2023-04-27 | Stop reason: HOSPADM

## 2023-04-27 RX ORDER — SODIUM CHLORIDE 0.9 % (FLUSH) 0.9 %
5-40 SYRINGE (ML) INJECTION AS NEEDED
Status: DISCONTINUED | OUTPATIENT
Start: 2023-04-27 | End: 2023-04-27 | Stop reason: HOSPADM

## 2023-04-27 RX ORDER — PHENYLEPHRINE HCL IN 0.9% NACL 0.4MG/10ML
SYRINGE (ML) INTRAVENOUS AS NEEDED
Status: DISCONTINUED | OUTPATIENT
Start: 2023-04-27 | End: 2023-04-27 | Stop reason: HOSPADM

## 2023-04-27 RX ORDER — NALOXONE HYDROCHLORIDE 0.4 MG/ML
0.4 INJECTION, SOLUTION INTRAMUSCULAR; INTRAVENOUS; SUBCUTANEOUS
Status: DISCONTINUED | OUTPATIENT
Start: 2023-04-27 | End: 2023-04-27 | Stop reason: HOSPADM

## 2023-04-27 RX ORDER — ATROPINE SULFATE 0.1 MG/ML
0.5 INJECTION INTRAVENOUS
Status: DISCONTINUED | OUTPATIENT
Start: 2023-04-27 | End: 2023-04-27 | Stop reason: HOSPADM

## 2023-04-27 RX ADMIN — LIDOCAINE HYDROCHLORIDE 40 MG: 20 INJECTION, SOLUTION EPIDURAL; INFILTRATION; INTRACAUDAL; PERINEURAL at 08:35

## 2023-04-27 RX ADMIN — Medication 80 MCG: at 08:52

## 2023-04-27 RX ADMIN — SODIUM CHLORIDE 75 ML/HR: 9 INJECTION, SOLUTION INTRAVENOUS at 07:09

## 2023-04-27 RX ADMIN — PROPOFOL 50 MG: 10 INJECTION, EMULSION INTRAVENOUS at 08:43

## 2023-04-27 RX ADMIN — PROPOFOL 50 MG: 10 INJECTION, EMULSION INTRAVENOUS at 08:46

## 2023-04-27 RX ADMIN — PROPOFOL 50 MG: 10 INJECTION, EMULSION INTRAVENOUS at 08:37

## 2023-04-27 RX ADMIN — PROPOFOL 50 MG: 10 INJECTION, EMULSION INTRAVENOUS at 08:35

## 2023-04-27 RX ADMIN — PROPOFOL 50 MG: 10 INJECTION, EMULSION INTRAVENOUS at 08:41

## 2023-04-27 NOTE — PROCEDURES
NAME:  Nubia Valdez   :   1945   MRN:   [id number]     Date/Time:  2023 8:50 AM    Colonoscopy Operative Report    Procedure Type:   Colonoscopy with polypectomy (cold snare)     Indications:     Personal history of colon polyps (screening only)  Pre-operative Diagnosis: see indication above  Post-operative Diagnosis:  See findings below  :  Renea Solis MD  Referring Provider: --Millie Or, MD    Exam:  Airway: clear, no airway problems anticipated  Heart: RRR, without gallops or rubs  Lungs: clear bilaterally without wheezes, crackles, or rhonchi  Abdomen: soft, nontender, nondistended, bowel sounds present  Mental Status: awake, alert and oriented to person, place and time    Sedation:  MAC anesthesia Propofol  Procedure Details:  After informed consent was obtained with all risks and benefits of procedure explained and preoperative exam completed, the patient was taken to the endoscopy suite and placed in the left lateral decubitus position. Upon sequential sedation as per above, a digital rectal exam was performed demonstrating internal hemorrhoids. The Olympus videocolonoscope  was inserted in the rectum and carefully advanced to the cecum, which was identified by the ileocecal valve and appendiceal orifice. The quality of preparation was good. The colonoscope was slowly withdrawn with careful evaluation between folds. Retroflexion in the rectum was completed demonstrating internal hemorrhoids. Findings:   4 mm sessile polyp in sigmoid colon. Removed by cold snare polypectomy  Medium sized internal hemorrhoids seen on retroflexion  Otherwise normal colonoscopy through to the cecum    Specimen Removed:  1. Sigmoid polyp  Complications: None. EBL:  None. Impression:    4 mm sessile polyp in sigmoid colon.  Removed by cold snare polypectomy  Medium sized internal hemorrhoids seen on retroflexion  Otherwise normal colonoscopy through to the cecum    Recommendations: Follow up pathology  Repeat colonoscopy in 5 years for surveillance based on overall health     Discharge Disposition:  Home in the company of a  when able to ambulate.       Ruth Ann Montalvo MD

## 2023-04-27 NOTE — DISCHARGE INSTRUCTIONS
[facility]  [id number]  1945    COLON DISCHARGE INSTRUCTIONS  Discomfort:  Redness at IV site- apply warm compress to area; if redness or soreness persist- contact your physician  There may be a slight amount of blood passed from the rectum  Gaseous discomfort- walking, belching will help relieve any discomfort  You may not operate a vehicle for 12 hours  You may not engage in an occupation involving machinery or appliances for rest of today  You may not drink alcoholic beverages for at least 12 hours  Avoid making any critical decisions for at least 24 hour  DIET:   Regular diet. - however -  remember your colon is empty and a heavy meal will produce gas. Avoid these foods:  vegetables, fried / greasy foods, carbonated drinks for today  MEDICATION:  Per Medication Reconciliation       ACTIVITY:  You may not resume your normal daily activities until tomorrow AM; it is recommended that you spend the remainder of the day resting -  avoid any strenuous activity. CALL M.D. ANY SIGN OF:   Increasing pain, nausea, vomiting  Abdominal distension (swelling)  New increased bleeding (oral or rectal)  Fever (chills)  Pain in chest area  Bloody discharge from nose or mouth  Shortness of breath    You may not  take any Advil, Aspirin, Ibuprofen, Motrin, Aleve, or Goodys for 10 days, ONLY  Tylenol as needed for pain. IMPRESSION:  Impression:    4 mm sessile polyp in sigmoid colon. Removed by cold snare polypectomy  Medium sized internal hemorrhoids seen on retroflexion  Otherwise normal colonoscopy through to the cecum    Recommendations:    Follow up pathology  Repeat colonoscopy in 5 years for surveillance based on overall health    Follow-up Instructions:   Call Dr. Tc Eubanks for the results of procedure / biopsy in 7-10 days  Telephone #[phone]      Yamil Daniels MD  Patient Education on Sedation / Analgesia Administered for Procedure      For 24 hours after general anesthesia or intravenous analgesia / sedation:  Have someone responsible help you with your care  Limit your activities  Do not drive and operate hazardous machinery  Do not make important personal, legal or business decisions  Do not drink alcoholic beverages  If you have not urinated within 8 hours after discharge, please contact your physician  Resume your medications unless otherwise instructed    For 24 hours after general anesthesia or intravenous analgesia / sedation  you may experience:  Drowsiness, dizziness, sleepiness, or confusion  Difficulty remembering or delayed reaction times  Difficulty with your balance, especially while walking, move slowly and carefully, do not make sudden position changes  Difficulty focusing or blurred vision    You may not be aware of slight changes in your behavior and/or your reaction time because of the medication used during and after your procedure.     Report the following to your physician:  Excessive pain, swelling, redness or odor of or around the surgical area  Temperature over 100.5  Nausea and vomiting lasting longer than 4 hours or if unable to take medications  Any signs of decreased circulation or nerve impairment to extremity: change in color, persistent numbness, tingling, coldness or increase pain  Any questions or concerns    IF YOU REPORT TO AN EMERGENCY ROOM, DOCTOR'S OFFICE OR HOSPITAL WITHIN 24 HOURS AFTER YOUR PROCEDURE, BRING THIS SHEET AND YOUR AFTER VISIT SUMMARY WITH YOU AND GIVE IT TO THE PHYSICIAN OR NURSE ATTENDING YOU.

## 2023-04-27 NOTE — PERIOP NOTES
TRANSFER - IN REPORT:    Verbal report received from Douglas Bhakta RN(name) on [employer]  being received from (unit) for routine progression of care      Report consisted of patients Situation, Background, Assessment and   Recommendations(SBAR). Information from the following report(s) SBAR was reviewed with the receiving nurse. Opportunity for questions and clarification was provided. Assessment completed upon patients arrival to unit and care assumed.

## 2023-04-27 NOTE — ANESTHESIA PREPROCEDURE EVALUATION
Anesthetic History     PONV (only one time with laproscopic surgery, no probs with colonoscopies)          Review of Systems / Medical History  Patient summary reviewed, nursing notes reviewed and pertinent labs reviewed    Pulmonary  Within defined limits                 Neuro/Psych         Psychiatric history (depression)     Cardiovascular    Hypertension              Exercise tolerance: >4 METS  Comments: Active, denies hrt probs or chest pain   GI/Hepatic/Renal               Comments: Hx of polyps Endo/Other    Diabetes (): using insulin    Arthritis and cancer (skin)     Other Findings              Physical Exam    Airway  Mallampati: I  TM Distance: 4 - 6 cm  Neck ROM: normal range of motion   Mouth opening: Normal     Cardiovascular    Rhythm: regular  Rate: normal         Dental  No notable dental hx       Pulmonary  Breath sounds clear to auscultation               Abdominal  GI exam deferred       Other Findings            Anesthetic Plan    ASA: 3  Anesthesia type: MAC          Induction: Intravenous  Anesthetic plan and risks discussed with: Patient      Glu 193 this am.

## 2023-04-27 NOTE — H&P
Gastroenterology Outpatient History and Physical    Patient: Nubia Valdez    Physician: Shashank No MD    Chief Complaint: H/o colon polyps  History of Present Illness: No GI complaints    History:  Past Medical History:   Diagnosis Date    Abnormal EKG 07/14/2020    pt getting workup with Dr. Chucho Reardon , results normal per pt    Arthritis     lower back    Cancer [facility])     skin     Depression     mild with occasional use of prozac    Diabetes ([address]     Nausea & vomiting     with anesthesia    Psychiatric disorder     depression      Past Surgical History:   Procedure Laterality Date    COLONOSCOPY N/A 11/16/2016    COLONOSCOPY performed by Concepción Samaniego MD at [address]  11/16/2016         HX BREAST BIOPSY Left     benign cyst removed    HX CATARACT REMOVAL Bilateral 08/2020    HX ORTHOPAEDIC      right hip arthroscopy - Dr. April Barrera  wnl fibroids    HX TONSILLECTOMY      HX VEIN STRIPPING Right     vein removed x1    ND COLSC FLX W/REMOVAL LESION BY HOT BX FORCEPS  08/22/2011           Social History     Socioeconomic History    Marital status: SINGLE   Tobacco Use    Smoking status: Never    Smokeless tobacco: Never   Vaping Use    Vaping Use: Never used   Substance and Sexual Activity    Alcohol use:  Yes     Alcohol/week: 3.0 standard drinks     Types: 3 Glasses of wine per week    Drug use: No    Sexual activity: Not Currently     Partners: Male     Birth control/protection: None      Family History   Problem Relation Age of Onset    Hypertension Brother       Patient Active Problem List   Diagnosis Code    Diabetes mellitus ([facility] E11.9    Colon polyps K63.5    Compression fracture of thoracic vertebra (HCC) S22.000A    Right hip pain M25.551    Hyperlipidemia E78.5    Tinea unguium B35.1    Skin cancer C44.90    Hip pain, left M25.552    Osteoporosis M81.0    Dense breast R92.2    Combined forms of age-related cataract of right eye H25.811    Essential hypertension I10       Allergies: Allergies   Allergen Reactions    Lisinopril Cough    Simvastatin Myalgia     Medications:   Prior to Admission medications    Medication Sig Start Date End Date Taking? Authorizing Provider   lancets (OneTouch UltraSoft Lancets) misc test blood sugar three three times daily 3/17/23  Yes Kristy Hinds MD   losartan (COZAAR) 50 mg tablet TAKE ONE TABLET BY MOUTH ONE TIME DAILY 3/17/23  Yes Kristy Hinds MD   spironolactone (ALDACTONE) 25 mg tablet TAKE ONE TABLET BY MOUTH ONE TIME DAILY 3/17/23  Yes Kristy Hinds MD   metFORMIN (GLUCOPHAGE) 500 mg tablet TAKE 2 TABLETS BY MOUTH IN THE MORNING AND 1 TABLET AT NOON AND 1 TABLET AT DINNER TIME. 3/3/23  Yes Kristy Hinds MD   glucose blood VI test strips (OneTouch Ultra Test) strip TEST BLOOD SUGAR THREE TIMES DAILY 2/13/23  Yes Kristy Hinds MD   insulin glargine (Lantus U-100 Insulin) 100 unit/mL injection inject 14 units every morning and 3 units in the evening 12/15/22  Yes Kristy Hinds MD   calcium carb/vit D3/minerals (Calcium Carbonate-Vit D3-Min) 600 mg (1,500 mg)-400 unit chew Take  by mouth daily. 6/27/12  Yes Demi Huddleston MD   multivitamins-minerals-lutein (MEN'S CENTRUM SILVER WITH LUTEIN) tab tablet Take  by mouth. Yes Provider, Historical   cholecalciferol (VITAMIN D3) (1000 Units /25 mcg) tablet Take  by mouth daily. Yes Provider, Historical   ciclopirox (PENLAC) 8 % solution Apply to affected nails and adjacent skin daily and remove every 7 days with alcohol. 10/31/22   Kristy Hinds MD   BD Insulin Syringe Ultra-Fine 0.5 mL 31 gauge x 5/16\" syrg USE AS DIRECTED TWICE DAILY 8/23/22   Kristy Hinds MD   rosuvastatin (CRESTOR) 5 mg tablet Take 1 Tablet by mouth nightly. 10/12/21   Kristy Hinds MD   meloxicam (MOBIC) 7.5 mg tablet Take 1-2 Tablets by mouth daily as needed for Pain.  7/19/21   Kristy Hinds MD   Insulin Syringe-Needle U-100 (BD Insulin Syringe Ultra-Fine) 0.3 mL 31 gauge x 5/16\" syrg USE AS DIRECTED TWICE DAILY 7/19/21   Clark Azevedo MD     Physical Exam:   Vital Signs: Blood pressure 111/64, pulse 70, resp. rate (!) 98, height 5' 6\" (1.676 m), weight 66.5 kg (146 lb 8 oz), SpO2 98 %, not currently breastfeeding.   General: well developed, well nourished   HEENT: unremarkable   Heart: regular rhythm no mumur    Lungs: clear   Abdominal:  benign   Neurological: unremarkable   Extremities: no edema     Findings/Diagnosis: H/o colon polyps  Plan of Care/Planned Procedure: Colonoscopy with conscious/deep sedation    Signed:  Jessika Morales MD 4/27/2023

## 2023-04-27 NOTE — PERIOP NOTES
Endoscopy Case End Note:    8119:  Procedure scope was pre-cleaned, per protocol, at bedside by [facility]. 0285:  Report received from anesthesia - Zabrina Carpio CRNA. See anesthesia flowsheet for intra-procedure vital signs and events. 7008:  Glasses returned to patient.

## 2023-04-27 NOTE — ANESTHESIA POSTPROCEDURE EVALUATION
Procedure(s):  COLONOSCOPY  ENDOSCOPIC POLYPECTOMY. MAC    Anesthesia Post Evaluation        Patient location during evaluation: PACU  Note status: Adequate. Level of consciousness: responsive to verbal stimuli and sleepy but conscious  Pain management: satisfactory to patient  Airway patency: patent  Anesthetic complications: no  Cardiovascular status: acceptable  Respiratory status: acceptable  Hydration status: acceptable  Comments: +Post-Anesthesia Evaluation and Assessment    Patient: Juan Bose MRN: [id number]  SSN: [id number]   YOB: 1945  Age: 76 y.o. Sex: female      Cardiovascular Function/Vital Signs    BP (!) 119/48   Pulse 74   Temp 36.6 °C (97.9 °F)   Resp 17   Ht 5' 6\" (1.676 m)   Wt 66.5 kg (146 lb 8 oz)   SpO2 98%   Breastfeeding No   BMI 23.65 kg/m²     Patient is status post Procedure(s):  COLONOSCOPY  ENDOSCOPIC POLYPECTOMY. Nausea/Vomiting: Controlled. Postoperative hydration reviewed and adequate. Pain:  Pain Scale 1: Numeric (0 - 10) (04/27/23 0902)  Pain Intensity 1: 0 (04/27/23 0902)   Managed. Neurological Status: At baseline. Mental Status and Level of Consciousness: Arousable. Pulmonary Status:   O2 Device: None (04/27/23 0857)   Adequate oxygenation and airway patent. Complications related to anesthesia: None    Post-anesthesia assessment completed. No concerns. Signed By: Iva Haynes MD    4/27/2023  Post anesthesia nausea and vomiting:  controlled      INITIAL Post-op Vital signs:   Vitals Value Taken Time   /54 04/27/23 0918   Temp 36.6 °C (97.9 °F) 04/27/23 0902   Pulse 65 04/27/23 0917   Resp 22 04/27/23 0917   SpO2 100 % 04/27/23 0910   Vitals shown include unvalidated device data.

## 2023-05-02 ENCOUNTER — OFFICE VISIT (OUTPATIENT)
Dept: INTERNAL MEDICINE CLINIC | Age: 78
End: 2023-05-02
Payer: MEDICARE

## 2023-05-02 VITALS
HEART RATE: 78 BPM | TEMPERATURE: 97.5 F | RESPIRATION RATE: 12 BRPM | WEIGHT: 147 LBS | OXYGEN SATURATION: 96 % | BODY MASS INDEX: 23.63 KG/M2 | HEIGHT: 66 IN | DIASTOLIC BLOOD PRESSURE: 78 MMHG | SYSTOLIC BLOOD PRESSURE: 125 MMHG

## 2023-05-02 DIAGNOSIS — Z79.4 TYPE 2 DIABETES MELLITUS WITHOUT COMPLICATION, WITH LONG-TERM CURRENT USE OF INSULIN (HCC): Primary | ICD-10-CM

## 2023-05-02 DIAGNOSIS — M25.552 LEFT HIP PAIN: ICD-10-CM

## 2023-05-02 DIAGNOSIS — E78.2 MIXED HYPERLIPIDEMIA: ICD-10-CM

## 2023-05-02 DIAGNOSIS — E11.9 TYPE 2 DIABETES MELLITUS WITHOUT COMPLICATION, WITH LONG-TERM CURRENT USE OF INSULIN (HCC): Primary | ICD-10-CM

## 2023-05-02 DIAGNOSIS — I10 ESSENTIAL HYPERTENSION: ICD-10-CM

## 2023-05-02 LAB — HBA1C MFR BLD HPLC: 7.1 %

## 2023-05-02 PROCEDURE — 3051F HG A1C>EQUAL 7.0%<8.0%: CPT | Performed by: INTERNAL MEDICINE

## 2023-05-02 PROCEDURE — 83036 HEMOGLOBIN GLYCOSYLATED A1C: CPT | Performed by: INTERNAL MEDICINE

## 2023-05-02 PROCEDURE — G8427 DOCREV CUR MEDS BY ELIG CLIN: HCPCS | Performed by: INTERNAL MEDICINE

## 2023-05-02 PROCEDURE — 99214 OFFICE O/P EST MOD 30 MIN: CPT | Performed by: INTERNAL MEDICINE

## 2023-05-02 PROCEDURE — G8510 SCR DEP NEG, NO PLAN REQD: HCPCS | Performed by: INTERNAL MEDICINE

## 2023-05-02 PROCEDURE — G8420 CALC BMI NORM PARAMETERS: HCPCS | Performed by: INTERNAL MEDICINE

## 2023-05-02 PROCEDURE — G8536 NO DOC ELDER MAL SCRN: HCPCS | Performed by: INTERNAL MEDICINE

## 2023-05-02 PROCEDURE — 1101F PT FALLS ASSESS-DOCD LE1/YR: CPT | Performed by: INTERNAL MEDICINE

## 2023-05-02 PROCEDURE — 1123F ACP DISCUSS/DSCN MKR DOCD: CPT | Performed by: INTERNAL MEDICINE

## 2023-05-02 PROCEDURE — 3078F DIAST BP <80 MM HG: CPT | Performed by: INTERNAL MEDICINE

## 2023-05-02 PROCEDURE — 3074F SYST BP LT 130 MM HG: CPT | Performed by: INTERNAL MEDICINE

## 2023-05-02 PROCEDURE — 1090F PRES/ABSN URINE INCON ASSESS: CPT | Performed by: INTERNAL MEDICINE

## 2023-05-02 RX ORDER — ROSUVASTATIN CALCIUM 5 MG/1
5 TABLET, COATED ORAL
Qty: 90 TABLET | Refills: 4 | Status: SHIPPED | OUTPATIENT
Start: 2023-05-02

## 2023-05-02 RX ORDER — MELOXICAM 7.5 MG/1
7.5-15 TABLET ORAL
Qty: 30 TABLET | Refills: 1 | Status: SHIPPED | OUTPATIENT
Start: 2023-05-02

## 2023-05-08 DIAGNOSIS — E11.9 TYPE 2 DIABETES MELLITUS WITHOUT COMPLICATION, WITH LONG-TERM CURRENT USE OF INSULIN (HCC): Primary | ICD-10-CM

## 2023-05-08 DIAGNOSIS — Z79.4 TYPE 2 DIABETES MELLITUS WITHOUT COMPLICATION, WITH LONG-TERM CURRENT USE OF INSULIN (HCC): Primary | ICD-10-CM

## 2023-05-08 DIAGNOSIS — E78.2 MIXED HYPERLIPIDEMIA: Primary | ICD-10-CM

## 2023-05-25 RX ORDER — BLOOD SUGAR DIAGNOSTIC
STRIP MISCELLANEOUS
Qty: 300 STRIP | Refills: 0 | Status: SHIPPED | OUTPATIENT
Start: 2023-05-25

## 2023-05-25 NOTE — TELEPHONE ENCOUNTER
PCP: Ventura Truong MD     Last appt:  5/2/2023      Future Appointments   Date Time Provider Bushra Ervin   11/6/2023  9:30 AM Nikole Li MD [facility] BS AMB          Requested Prescriptions     Pending Prescriptions Disp Refills    ONETOUCH ULTRA strip [Pharmacy Med Name: Alee Rodriguezroger SHER] 300 strip 0     Sig: TEST BLOOD SUGAR 3 TIMES DAILY

## 2023-06-01 NOTE — TELEPHONE ENCOUNTER
PCP: Rosaura Kent MD     Last appt:  5/2/2023      Future Appointments   Date Time Provider Bushra Ervin   11/6/2023  9:30 AM Pratibha Wilkinson MD [facility] AMB          Requested Prescriptions     Pending Prescriptions Disp Refills    metFORMIN (GLUCOPHAGE) 500 MG tablet [Pharmacy Med Name: METFORMIN HCL 500MG TABS] 360 tablet 0     Sig: TAKE 2 TABLETS BY MOUTH IN THE MORNING, 1 TABLET AT NOON, AND [facility]T [facility]

## 2023-06-09 RX ORDER — PEN NEEDLE, DIABETIC 29 G X1/2"
NEEDLE, DISPOSABLE MISCELLANEOUS
Qty: 100 EACH | Refills: 5 | Status: SHIPPED | OUTPATIENT
Start: 2023-06-09

## 2023-06-09 NOTE — TELEPHONE ENCOUNTER
PCP: Kennedi Salinas MD     Last appt: 5/2/2023      Future Appointments   Date Time Provider Bushra Ervin   11/6/2023  9:30 AM Jessica Zee MD [facility] BS AMB          Requested Prescriptions     Pending Prescriptions Disp Refills    BD INSULIN SYRINGE U/F 31G X 5/16\" 0.5 ML MISC [Pharmacy Med Name: BD INS SY 0.5ML 31GX5/16] 100 each 5     Sig: USE AS DIRECTED TWICE DAILY

## 2023-06-27 RX ORDER — INSULIN GLARGINE 100 [IU]/ML
INJECTION, SOLUTION SUBCUTANEOUS
Qty: 10 ML | Refills: 5 | Status: SHIPPED | OUTPATIENT
Start: 2023-06-27

## 2023-06-27 RX ORDER — SPIRONOLACTONE 25 MG/1
TABLET ORAL
Qty: 90 TABLET | Refills: 0 | Status: SHIPPED | OUTPATIENT
Start: 2023-06-27

## 2023-06-27 RX ORDER — LOSARTAN POTASSIUM 50 MG/1
TABLET ORAL
Qty: 90 TABLET | Refills: 0 | Status: SHIPPED | OUTPATIENT
Start: 2023-06-27

## 2023-07-03 RX ORDER — LANCETS
EACH MISCELLANEOUS
Qty: 300 EACH | Refills: 3 | Status: SHIPPED | OUTPATIENT
Start: 2023-07-03

## 2023-07-03 NOTE — TELEPHONE ENCOUNTER
PCP: Maria Del Carmen Good MD     Last appt: 5/2/2023    Future Appointments   Date Time Provider [address]   11/6/2023  9:30 AM Maria Del Carmen Good MD [facility] BS AMB          Requested Prescriptions     Pending Prescriptions Disp Refills    ONE TOUCH ULTRASOFT LANCETS Bushar Gamez [Pharmacy Med Name: Walter Saint HERMES  [facility]] 300 each 3     Sig: TEST BLOOD SUGAR THREE TIMES DAILY

## 2023-09-05 NOTE — TELEPHONE ENCOUNTER
PCP: Vladimir Keenan MD     Last appt: 5/2/2023    Future Appointments   Date Time Provider [address]   11/6/2023  9:30 AM Vladimir Keenan MD [facility] BS AMB          Requested Prescriptions     Pending Prescriptions Disp Refills    metFORMIN (GLUCOPHAGE) 500 MG tablet 360 tablet 1     Sig: TAKE 2 TABLETS BY MOUTH IN THE MORNING, 1 TABLET AT NOON, AND 1 TABLET AT [facility] TIME

## 2023-09-13 RX ORDER — BLOOD SUGAR DIAGNOSTIC
STRIP MISCELLANEOUS
Qty: 300 STRIP | Refills: 3 | Status: SHIPPED | OUTPATIENT
Start: 2023-09-13

## 2023-09-13 NOTE — TELEPHONE ENCOUNTER
PCP: Roly Cleveland MD     Last appt: 5/2/2023    Future Appointments   Date Time Provider [address]   11/6/2023  9:30 AM Roly Cleveland MD [facility] BS AMB          Requested Prescriptions     Pending Prescriptions Disp Refills    blood glucose test strips (ONETOUCH ULTRA) strip 300 strip 3     Sig: TEST BLOOD SUGAR 3 TIMES DAILY DX E11.9

## 2023-10-31 NOTE — TELEPHONE ENCOUNTER
PCP: Cristóbal Wright MD     Last appt: 5/2/2023    Future Appointments   Date Time Provider [address]   11/6/2023  9:30 AM Cristóbal Wright MD [facility] BS AMB          Requested Prescriptions     Pending Prescriptions Disp Refills    losartan (COZAAR) 50 MG tablet 90 tablet 2     Sig: Take 1 tablet by mouth daily    spironolactone (ALDACTONE) 25 MG tablet 90 tablet 2     Sig: Take 1 tablet by mouth daily

## 2023-11-01 RX ORDER — SPIRONOLACTONE 25 MG/1
25 TABLET ORAL DAILY
Qty: 90 TABLET | Refills: 2 | Status: SHIPPED | OUTPATIENT
Start: 2023-11-01

## 2023-11-01 RX ORDER — LOSARTAN POTASSIUM 50 MG/1
50 TABLET ORAL DAILY
Qty: 90 TABLET | Refills: 2 | Status: SHIPPED | OUTPATIENT
Start: 2023-11-01

## 2023-11-02 LAB
ALBUMIN SERPL-MCNC: 4.4 G/DL (ref 3.8–4.8)
ALBUMIN/CREAT UR: 7 MG/G CREAT (ref 0–29)
ALBUMIN/GLOB SERPL: 2 {RATIO} (ref 1.2–2.2)
ALP SERPL-CCNC: 67 IU/L (ref 44–121)
ALT SERPL-CCNC: 10 IU/L (ref 0–32)
AST SERPL-CCNC: 18 IU/L (ref 0–40)
BILIRUB SERPL-MCNC: 0.4 MG/DL (ref 0–1.2)
BUN SERPL-MCNC: 21 MG/DL (ref 8–27)
BUN/CREAT SERPL: 27 (ref 12–28)
CALCIUM SERPL-MCNC: 9.5 MG/DL (ref 8.7–10.3)
CHLORIDE SERPL-SCNC: 104 MMOL/L (ref 96–106)
CHOLEST SERPL-MCNC: 152 MG/DL (ref 100–199)
CO2 SERPL-SCNC: 24 MMOL/L (ref 20–29)
CREAT SERPL-MCNC: 0.78 MG/DL (ref 0.57–1)
CREAT UR-MCNC: 136 MG/DL
EGFRCR SERPLBLD CKD-EPI 2021: 77 ML/MIN/1.73
EST. AVERAGE GLUCOSE BLD GHB EST-MCNC: 171 MG/DL
GLOBULIN SER CALC-MCNC: 2.2 G/DL (ref 1.5–4.5)
GLUCOSE SERPL-MCNC: 120 MG/DL (ref 70–99)
HBA1C MFR BLD: 7.6 % (ref 4.8–5.6)
HDLC SERPL-MCNC: 71 MG/DL
LDLC SERPL CALC-MCNC: 68 MG/DL (ref 0–99)
MICROALBUMIN UR-MCNC: 9.9 UG/ML
POTASSIUM SERPL-SCNC: 4 MMOL/L (ref 3.5–5.2)
PROT SERPL-MCNC: 6.6 G/DL (ref 6–8.5)
SODIUM SERPL-SCNC: 142 MMOL/L (ref 134–144)
TRIGL SERPL-MCNC: 68 MG/DL (ref 0–149)
VLDLC SERPL CALC-MCNC: 13 MG/DL (ref 5–40)

## 2023-11-06 ENCOUNTER — OFFICE VISIT (OUTPATIENT)
Facility: CLINIC | Age: 78
End: 2023-11-06

## 2023-11-06 VITALS
TEMPERATURE: 97.6 F | SYSTOLIC BLOOD PRESSURE: 118 MMHG | WEIGHT: 150.2 LBS | OXYGEN SATURATION: 95 % | HEART RATE: 79 BPM | DIASTOLIC BLOOD PRESSURE: 71 MMHG | BODY MASS INDEX: 24.24 KG/M2

## 2023-11-06 DIAGNOSIS — E78.2 MIXED HYPERLIPIDEMIA: ICD-10-CM

## 2023-11-06 DIAGNOSIS — I10 ESSENTIAL HYPERTENSION: ICD-10-CM

## 2023-11-06 DIAGNOSIS — Z79.4 TYPE 2 DIABETES MELLITUS WITHOUT COMPLICATION, WITH LONG-TERM CURRENT USE OF INSULIN (HCC): ICD-10-CM

## 2023-11-06 DIAGNOSIS — E11.9 TYPE 2 DIABETES MELLITUS WITHOUT COMPLICATION, WITH LONG-TERM CURRENT USE OF INSULIN (HCC): ICD-10-CM

## 2023-11-06 DIAGNOSIS — Z00.00 MEDICARE ANNUAL WELLNESS VISIT, SUBSEQUENT: Primary | ICD-10-CM

## 2023-11-06 ASSESSMENT — PATIENT HEALTH QUESTIONNAIRE - PHQ9
SUM OF ALL RESPONSES TO PHQ QUESTIONS 1-9: 0
2. FEELING DOWN, DEPRESSED OR HOPELESS: 0
SUM OF ALL RESPONSES TO PHQ9 QUESTIONS 1 & 2: 0
SUM OF ALL RESPONSES TO PHQ QUESTIONS 1-9: 0
1. LITTLE INTEREST OR PLEASURE IN DOING THINGS: 0

## 2023-11-06 ASSESSMENT — LIFESTYLE VARIABLES
HOW MANY STANDARD DRINKS CONTAINING ALCOHOL DO YOU HAVE ON A TYPICAL DAY: 1 OR 2
HOW OFTEN DO YOU HAVE A DRINK CONTAINING ALCOHOL: MONTHLY OR LESS

## 2023-12-15 ENCOUNTER — PATIENT MESSAGE (OUTPATIENT)
Facility: CLINIC | Age: 78
End: 2023-12-15

## 2023-12-15 NOTE — TELEPHONE ENCOUNTER
PCP: Tito Marcelo MD     Last appt: 11/6/2023    Future Appointments   Date Time Provider [address]   5/9/2024  8:30 AM Tito Marcelo MD [facility] BS AMB          Requested Prescriptions     Pending Prescriptions Disp Refills    ciclopirox (PENLAC) 8 % solution       Sig: Apply to affected nails and adjacent skin daily and remove every 7 days with alcohol. Strong peripheral pulses

## 2023-12-15 NOTE — TELEPHONE ENCOUNTER
From: Deandre Bernal  To: Dr. Fili Gallego: 12/15/2023 9:05 AM EST  Subject: Refill    I would like a refill (or a new prescription) for   Ciclopirox 8% Topical Solution.

## 2024-01-08 RX ORDER — INSULIN GLARGINE 100 [IU]/ML
INJECTION, SOLUTION SUBCUTANEOUS
Qty: 10 ML | Refills: 5 | Status: SHIPPED | OUTPATIENT
Start: 2024-01-08

## 2024-01-08 NOTE — TELEPHONE ENCOUNTER
PCP: Nano Mancilla MD     Last appt:  11/6/2023      Future Appointments   Date Time Provider Department Center   5/9/2024  8:30 AM Nano Mancilla MD [facility] BS AMB          Requested Prescriptions     Pending Prescriptions Disp Refills    insulin glargine (LANTUS) 100 UNIT/ML injection vial 10 mL 5     Sig: INJECT 14 UNITS EVERY MORNING AND 3 UNITS IN THE EVENING

## 2024-03-04 NOTE — TELEPHONE ENCOUNTER
PCP: Nano Mancilla MD     Last appt: 11/6/2023    Future Appointments   Date Time Provider Department Center   5/9/2024  8:30 AM Nano Mancilla MD [facility] AMB          Requested Prescriptions     Pending Prescriptions Disp Refills    metFORMIN (GLUCOPHAGE) 500 MG tablet 360 tablet 1     Sig: TAKE 2 TABLETS BY MOUTH IN THE MORNING, 1 TABLET AT NOON, AND 1 TABLET AT DINNER TIME

## 2024-04-23 RX ORDER — SYRINGE-NEEDLE,INSULIN,0.5 ML 27GX1/2"
SYRINGE, EMPTY DISPOSABLE MISCELLANEOUS
Qty: 100 EACH | Refills: 5 | Status: SHIPPED | OUTPATIENT
Start: 2024-04-23

## 2024-05-09 ENCOUNTER — OFFICE VISIT (OUTPATIENT)
Facility: CLINIC | Age: 79
End: 2024-05-09

## 2024-05-09 VITALS
HEIGHT: 66 IN | RESPIRATION RATE: 12 BRPM | TEMPERATURE: 97.7 F | SYSTOLIC BLOOD PRESSURE: 124 MMHG | DIASTOLIC BLOOD PRESSURE: 73 MMHG | WEIGHT: 154 LBS | HEART RATE: 66 BPM | OXYGEN SATURATION: 98 % | BODY MASS INDEX: 24.75 KG/M2

## 2024-05-09 DIAGNOSIS — E78.2 MIXED HYPERLIPIDEMIA: ICD-10-CM

## 2024-05-09 DIAGNOSIS — G89.29 CHRONIC MIDLINE LOW BACK PAIN WITHOUT SCIATICA: ICD-10-CM

## 2024-05-09 DIAGNOSIS — I10 ESSENTIAL HYPERTENSION: ICD-10-CM

## 2024-05-09 DIAGNOSIS — M54.50 CHRONIC MIDLINE LOW BACK PAIN WITHOUT SCIATICA: ICD-10-CM

## 2024-05-09 DIAGNOSIS — Z79.4 TYPE 2 DIABETES MELLITUS WITHOUT COMPLICATION, WITH LONG-TERM CURRENT USE OF INSULIN (HCC): Primary | ICD-10-CM

## 2024-05-09 DIAGNOSIS — E11.9 TYPE 2 DIABETES MELLITUS WITHOUT COMPLICATION, WITH LONG-TERM CURRENT USE OF INSULIN (HCC): Primary | ICD-10-CM

## 2024-05-09 RX ORDER — MELOXICAM 7.5 MG/1
7.5 TABLET ORAL DAILY PRN
Qty: 30 TABLET | Refills: 2 | Status: SHIPPED | OUTPATIENT
Start: 2024-05-09

## 2024-05-09 SDOH — ECONOMIC STABILITY: FOOD INSECURITY: WITHIN THE PAST 12 MONTHS, YOU WORRIED THAT YOUR FOOD WOULD RUN OUT BEFORE YOU GOT MONEY TO BUY MORE.: NEVER TRUE

## 2024-05-09 SDOH — ECONOMIC STABILITY: INCOME INSECURITY: HOW HARD IS IT FOR YOU TO PAY FOR THE VERY BASICS LIKE FOOD, HOUSING, MEDICAL CARE, AND HEATING?: NOT HARD AT ALL

## 2024-05-09 SDOH — ECONOMIC STABILITY: HOUSING INSECURITY
IN THE LAST 12 MONTHS, WAS THERE A TIME WHEN YOU DID NOT HAVE A STEADY PLACE TO SLEEP OR SLEPT IN A SHELTER (INCLUDING NOW)?: NO

## 2024-05-09 SDOH — ECONOMIC STABILITY: FOOD INSECURITY: WITHIN THE PAST 12 MONTHS, THE FOOD YOU BOUGHT JUST DIDN'T LAST AND YOU DIDN'T HAVE MONEY TO GET MORE.: NEVER TRUE

## 2024-05-09 ASSESSMENT — PATIENT HEALTH QUESTIONNAIRE - PHQ9
SUM OF ALL RESPONSES TO PHQ QUESTIONS 1-9: 0
SUM OF ALL RESPONSES TO PHQ QUESTIONS 1-9: 0
1. LITTLE INTEREST OR PLEASURE IN DOING THINGS: NOT AT ALL
SUM OF ALL RESPONSES TO PHQ QUESTIONS 1-9: 0
SUM OF ALL RESPONSES TO PHQ9 QUESTIONS 1 & 2: 0
SUM OF ALL RESPONSES TO PHQ QUESTIONS 1-9: 0
2. FEELING DOWN, DEPRESSED OR HOPELESS: NOT AT ALL

## 2024-05-09 NOTE — PROGRESS NOTES
Lynn Bernal  Identified pt with two pt identifiers(name and ).     Chief Complaint   Patient presents with    Diabetes    Hypertension    Cholesterol Problem    Back Pain       Reviewed record In preparation for visit and have obtained necessary documentation.     1. Have you been to the ER, urgent care clinic or hospitalized since your last visit? No     2. Have you seen or consulted any other health care providers outside of the [facility] since your last visit? Include any pap smears or colon screening. No    Vitals reviewed with provider.    Health Maintenance reviewed:     Health Maintenance Due   Topic    Respiratory Syncytial Virus (RSV) Pregnant or age 60 yrs+ (1 - 1-dose 60+ series)    Polio vaccine (3 of 3 - Adult catch-up series)    DTaP/Tdap/Td vaccine (2 - Td or Tdap)    COVID-19 Vaccine ( season)    Diabetic retinal exam     A1C test (Diabetic or Prediabetic)     Diabetic foot exam           Wt Readings from Last 3 Encounters:   24 69.9 kg (154 lb)   23 68.1 kg (150 lb 3.2 oz)   23 66.7 kg (147 lb)        Temp Readings from Last 3 Encounters:   24 97.7 °F (36.5 °C) (Oral)   23 97.6 °F (36.4 °C)        BP Readings from Last 3 Encounters:   24 124/73   23 118/71   23 125/78        Pulse Readings from Last 3 Encounters:   24 66   23 79   23 78      [unfilled]       Learning Assessment:   :         2024     8:30 AM   [facility] AMB LEARNING ASSESSMENT   Primary Learner Patient   level of education 4 YEARS OF COLLEGE   Barriers Factors NONE   co-learner caregiver No   Primary Language ENGLISH   Learning Preference DEMONSTRATION   Answered By patient   Relationship to Learner SELF         Fall Risk Assessment:   :         2023     9:25 AM 10/31/2022     9:29 AM 10/12/2021     8:00 AM 2021     8:44 AM   Amb Fall Risk Assessment and TUG Test   Do you feel unsteady or are you worried about falling?  no 
MD Nelly   Calcium Carb-Cholecalciferol 600-20 MG-MCG CHEW Take by mouth daily 6/27/12  Yes Automatic Reconciliation, Ar   vitamin D (CHOLECALCIFEROL) 25 MCG (1000 UT) TABS tablet Take by mouth daily   Yes Automatic Reconciliation, Ar   rosuvastatin (CRESTOR) 5 MG tablet Take by mouth 10/12/21  Yes Automatic Reconciliation, Ar         Allergies   Allergen Reactions    Lisinopril Cough    Simvastatin Myalgia         REVIEW OF SYSTEMS:  Per HPI    PHYSICAL EXAM:  /73 (Site: Left Upper Arm, Position: Sitting)   Pulse 66   Temp 97.7 °F (36.5 °C) (Oral)   Resp 12   Ht 1.676 m (5' 6\")   Wt 69.9 kg (154 lb)   SpO2 98%   BMI 24.86 kg/m²   Constitutional: Appears well-developed and well-nourished. No distress.   HENT:   Head: Normocephalic and atraumatic.   Eyes: No scleral icterus.   Neck: no lad, no tm, supple   Cardiovascular: Normal S1/S2, regular rhythm.  No murmurs, rubs, or gallops.  Pulmonary/Chest: Effort normal and breath sounds normal. No respiratory distress. No wheezes, rhonchi, or rales.   Abdomen: Soft, NT/ND, +BS, no rebound or guarding, no masses, no HSM appreciated.   Ext: No edema.   Neurological: Alert.   Psychiatric: Normal mood and affect. Behavior is normal.    DM foot exam: 2+ pedal pulses, no lesions, sensation intact to monofilament b/l and vibration b/l     Lab Results   Component Value Date/Time     11/01/2023 08:37 AM    K 4.0 11/01/2023 08:37 AM     11/01/2023 08:37 AM    CO2 24 11/01/2023 08:37 AM    BUN 21 11/01/2023 08:37 AM    GFRAA 79 10/04/2021 08:05 AM    ALT 10 11/01/2023 08:37 AM     No results found for: \"HBA1C\"   Lab Results   Component Value Date/Time    CHOL 152 11/01/2023 08:37 AM    CHOL 145 10/26/2022 08:09 AM    HDL 71 11/01/2023 08:37 AM    LDL 68 11/01/2023 08:37 AM    VLDL 13 11/01/2023 08:37 AM    VLDL 12 10/26/2022 08:09 AM          ASSESSMENT/PLAN  Lynn was seen today for diabetes, hypertension, cholesterol problem and back

## 2024-05-10 ENCOUNTER — TRANSCRIBE ORDERS (OUTPATIENT)
Facility: HOSPITAL | Age: 79
End: 2024-05-10

## 2024-05-10 DIAGNOSIS — Z12.31 VISIT FOR SCREENING MAMMOGRAM: Primary | ICD-10-CM

## 2024-05-10 LAB
EST. AVERAGE GLUCOSE BLD GHB EST-MCNC: 151 MG/DL
HBA1C MFR BLD: 6.9 % (ref 4–5.6)

## 2024-06-18 ENCOUNTER — HOSPITAL ENCOUNTER (OUTPATIENT)
Facility: HOSPITAL | Age: 79
Discharge: HOME | End: 2024-06-21
Attending: INTERNAL MEDICINE
Payer: MEDICARE

## 2024-06-18 VITALS — WEIGHT: 154 LBS | BODY MASS INDEX: 24.75 KG/M2 | HEIGHT: 66 IN

## 2024-06-18 DIAGNOSIS — Z12.31 VISIT FOR SCREENING MAMMOGRAM: ICD-10-CM

## 2024-06-18 PROCEDURE — 77063 BREAST TOMOSYNTHESIS BI: CPT

## 2024-07-15 RX ORDER — INSULIN GLARGINE 100 [IU]/ML
INJECTION, SOLUTION SUBCUTANEOUS
Qty: 10 ML | Refills: 1 | Status: SHIPPED | OUTPATIENT
Start: 2024-07-15

## 2024-07-15 NOTE — TELEPHONE ENCOUNTER
PCP: Nano Mancilla MD     Last appt:  5/9/2024      Future Appointments   Date Time Provider Department Center   11/21/2024  8:30 AM Nano Mancilla MD [facility] AMB          Requested Prescriptions     Pending Prescriptions Disp Refills    insulin glargine (LANTUS) 100 UNIT/ML injection vial [Pharmacy Med Name: LANTUS 100UNIT/ML SOLN] 10 mL 5     Sig: INJECT 14 UNITS EVERY MORNING AND 3 UNITS IN THE EVENING

## 2024-09-11 RX ORDER — INSULIN GLARGINE 100 [IU]/ML
INJECTION, SOLUTION SUBCUTANEOUS
Qty: 10 ML | Refills: 1 | Status: SHIPPED | OUTPATIENT
Start: 2024-09-11

## 2024-10-03 RX ORDER — LANCETS 30 GAUGE
EACH MISCELLANEOUS
Qty: 300 EACH | Refills: 3 | Status: SHIPPED | OUTPATIENT
Start: 2024-10-03

## 2024-10-03 NOTE — TELEPHONE ENCOUNTER
PCP: Nano Mancilla MD     Last appt:  5/9/2024      Future Appointments   Date Time Provider Department Center   11/21/2024  8:30 AM Nano Mancilla MD [facility] DEP          Requested Prescriptions     Pending Prescriptions Disp Refills    OneTouch UltraSoft 2 Lancets MISC [Pharmacy Med Name: ONETOUCH ULTRASOFT 2 LANCETS  MISC] 300 each 3     Sig: TEST BLOOD SUGAR 3 TMES DAILY

## 2024-11-11 NOTE — TELEPHONE ENCOUNTER
PCP: Nano Mancilla MD     Last appt: 5/9/2024    Future Appointments   Date Time Provider Department Center   11/21/2024  8:30 AM Nano Mancilla MD [facility] DEP          Requested Prescriptions     Pending Prescriptions Disp Refills    insulin glargine (LANTUS) 100 UNIT/ML injection vial [Pharmacy Med Name: LANTUS 100UNIT/ML SOLN] 10 mL 1     Sig: INJECT 14 UNITS SUBCUTANEOUSLY IN THE MORNING AND 3 UNITS IN THE EVENING

## 2024-11-12 RX ORDER — INSULIN GLARGINE 100 [IU]/ML
INJECTION, SOLUTION SUBCUTANEOUS
Qty: 10 ML | Refills: 1 | Status: SHIPPED | OUTPATIENT
Start: 2024-11-12

## 2024-11-26 ENCOUNTER — OFFICE VISIT (OUTPATIENT)
Facility: CLINIC | Age: 79
End: 2024-11-26

## 2024-11-26 VITALS
HEIGHT: 66 IN | WEIGHT: 151.5 LBS | HEART RATE: 80 BPM | DIASTOLIC BLOOD PRESSURE: 79 MMHG | OXYGEN SATURATION: 97 % | SYSTOLIC BLOOD PRESSURE: 132 MMHG | TEMPERATURE: 97.6 F | BODY MASS INDEX: 24.35 KG/M2 | RESPIRATION RATE: 12 BRPM

## 2024-11-26 DIAGNOSIS — R09.82 POSTNASAL DRIP: ICD-10-CM

## 2024-11-26 DIAGNOSIS — M19.042 ARTHRITIS OF FINGER OF BOTH HANDS: ICD-10-CM

## 2024-11-26 DIAGNOSIS — M19.041 ARTHRITIS OF FINGER OF BOTH HANDS: ICD-10-CM

## 2024-11-26 DIAGNOSIS — Z79.4 TYPE 2 DIABETES MELLITUS WITH HYPOGLYCEMIA WITHOUT COMA, WITH LONG-TERM CURRENT USE OF INSULIN (HCC): ICD-10-CM

## 2024-11-26 DIAGNOSIS — Z00.00 MEDICARE ANNUAL WELLNESS VISIT, SUBSEQUENT: Primary | ICD-10-CM

## 2024-11-26 DIAGNOSIS — E78.2 MIXED HYPERLIPIDEMIA: ICD-10-CM

## 2024-11-26 DIAGNOSIS — I10 ESSENTIAL HYPERTENSION: ICD-10-CM

## 2024-11-26 DIAGNOSIS — H91.93 BILATERAL HEARING LOSS, UNSPECIFIED HEARING LOSS TYPE: ICD-10-CM

## 2024-11-26 DIAGNOSIS — E11.649 TYPE 2 DIABETES MELLITUS WITH HYPOGLYCEMIA WITHOUT COMA, WITH LONG-TERM CURRENT USE OF INSULIN (HCC): ICD-10-CM

## 2024-11-26 DIAGNOSIS — R05.3 CHRONIC COUGH: ICD-10-CM

## 2024-11-26 RX ORDER — FLUTICASONE PROPIONATE 50 MCG
2 SPRAY, SUSPENSION (ML) NASAL DAILY
Qty: 48 G | Refills: 1 | Status: SHIPPED | OUTPATIENT
Start: 2024-11-26

## 2024-11-26 RX ORDER — ACYCLOVIR 400 MG/1
TABLET ORAL
Qty: 4 EACH | Refills: 5 | Status: SHIPPED | OUTPATIENT
Start: 2024-11-26

## 2024-11-26 RX ORDER — ROSUVASTATIN CALCIUM 5 MG/1
5 TABLET, COATED ORAL DAILY
Qty: 90 TABLET | Refills: 3 | Status: SHIPPED | OUTPATIENT
Start: 2024-11-26

## 2024-11-26 RX ORDER — ACYCLOVIR 400 MG/1
TABLET ORAL
Qty: 1 EACH | Refills: 5 | Status: SHIPPED | OUTPATIENT
Start: 2024-11-26

## 2024-11-26 ASSESSMENT — LIFESTYLE VARIABLES
HOW OFTEN DO YOU HAVE A DRINK CONTAINING ALCOHOL: 2-4 TIMES A MONTH
HOW MANY STANDARD DRINKS CONTAINING ALCOHOL DO YOU HAVE ON A TYPICAL DAY: 1 OR 2

## 2024-11-26 ASSESSMENT — PATIENT HEALTH QUESTIONNAIRE - PHQ9
2. FEELING DOWN, DEPRESSED OR HOPELESS: NOT AT ALL
SUM OF ALL RESPONSES TO PHQ QUESTIONS 1-9: 0
SUM OF ALL RESPONSES TO PHQ9 QUESTIONS 1 & 2: 0
SUM OF ALL RESPONSES TO PHQ QUESTIONS 1-9: 0
SUM OF ALL RESPONSES TO PHQ QUESTIONS 1-9: 0
1. LITTLE INTEREST OR PLEASURE IN DOING THINGS: NOT AT ALL
SUM OF ALL RESPONSES TO PHQ QUESTIONS 1-9: 0

## 2024-11-26 NOTE — PROGRESS NOTES
Lynn Bernal  Identified pt with two pt identifiers(name and ).     Chief Complaint   Patient presents with    Medicare AWV    Hypertension    Diabetes    knot on thumb     Cough     6 months       Reviewed record In preparation for visit and have obtained necessary documentation.     1. Have you been to the ER, urgent care clinic or hospitalized since your last visit? No     2. Have you seen or consulted any other health care providers outside of the [facility] since your last visit? Include any pap smears or colon screening. No    Vitals reviewed with provider.    Health Maintenance reviewed:     Health Maintenance Due   Topic    Polio vaccine (3 of 3 - Adult catch-up series)    Respiratory Syncytial Virus (RSV) Pregnant or age 60 yrs+ (1 - 1-dose 75+ series)    DTaP/Tdap/Td vaccine (2 - Td or Tdap)    Flu vaccine (1)    COVID-19 Vaccine ( season)    Annual Wellness Visit (Medicare)           Wt Readings from Last 3 Encounters:   24 68.7 kg (151 lb 8 oz)   24 69.9 kg (154 lb)   24 69.9 kg (154 lb)        Temp Readings from Last 3 Encounters:   24 97.6 °F (36.4 °C) (Oral)   24 97.7 °F (36.5 °C) (Oral)   23 97.6 °F (36.4 °C)        BP Readings from Last 3 Encounters:   24 132/79   24 124/73   23 118/71        Pulse Readings from Last 3 Encounters:   24 80   24 66   23 79      [unfilled]       Learning Assessment:   :         2024     8:30 AM   [facility] AMB LEARNING ASSESSMENT   Primary Learner Patient   level of education 4 YEARS OF COLLEGE   Barriers Factors NONE   co-learner caregiver No   Primary Language ENGLISH   Learning Preference DEMONSTRATION   Answered By patient   Relationship to Learner SELF         Fall Risk Assessment:   :         2023     9:25 AM 10/31/2022     9:29 AM 10/12/2021     8:00 AM 2021     8:44 AM   Amb Fall Risk Assessment and TUG Test   Do you feel unsteady or are you

## 2024-11-26 NOTE — PROGRESS NOTES
Medicare Annual Wellness Visit    Lynn Bernal is here for Medicare AWV, Hypertension, Diabetes, knot on thumb , and Cough (6 months)    Assessment & Plan   Medicare annual wellness visit, subsequent  Type 2 diabetes mellitus with hypoglycemia without coma, with long-term current use of insulin (HCC)  -     Continuous Glucose  (DEXCOM G7 ) CARISSA; Use to check glucose qid due to insulin dependant diabetes with hypoglycemia, Disp-1 each, R-5Normal  -     Continuous Glucose Sensor (DEXCOM G7 SENSOR) MISC; Use to check glucose qid due to insulin dependant diabetes with hypoglycemia, Disp-4 each, R-5Normal  Essential hypertension  Chronic cough  -     XR CHEST (2 VIEWS); Future  Postnasal drip  -     fluticasone (FLONASE) 50 MCG/ACT nasal spray; 2 sprays by Each Nostril route daily, Disp-48 g, R-1Normal  Bilateral hearing loss, unspecified hearing loss type  -     External Referral To ENT  Mixed hyperlipidemia  -     rosuvastatin (CRESTOR) 5 MG tablet; Take 1 tablet by mouth daily, Disp-90 tablet, R-3Normal  Arthritis of finger of both hands    A1c is at goal but patient has had hypoglycemia and is on insulin.  For this reason she would benefit from a continuous glucometer.  BP at goal, continue current medications.  Chronic cough, suspect from postnasal drip, also consider GERD.  For now we will add Flonase and to get chest x-ray.  No red flags.  Refer to ENT for reevaluation of hearing loss.  Lipids at goal, continue statin.  Arthritis changes both hands, not causing significant pain.  May use Voltaren gel as needed.    Recommendations for Preventive Services Due: see orders and patient instructions/AVS.  Recommended screening schedule for the next 5-10 years is provided to the patient in written form: see Patient Instructions/AVS.     Return in about 6 months (around 5/26/2025), or if symptoms worsen or fail to improve, for DIABETES.     Subjective   The following acute and/or chronic problems

## 2024-11-26 NOTE — PATIENT INSTRUCTIONS
F075 to learn more about \"A Healthy Heart: Care Instructions.\"  Current as of: June 24, 2023  Content Version: 14.2  © 2024 [employer].   Care instructions adapted under license by [employer]. If you have questions about a medical condition or this instruction, always ask your healthcare professional. [employer], [employer] disclaims any warranty or liability for your use of this information.      Personalized Preventive Plan for Lynn Bernal - 11/26/2024  Medicare offers a range of preventive health benefits. Some of the tests and screenings are paid in full while other may be subject to a deductible, co-insurance, and/or copay.    Some of these benefits include a comprehensive review of your medical history including lifestyle, illnesses that may run in your family, and various assessments and screenings as appropriate.    After reviewing your medical record and screening and assessments performed today your provider may have ordered immunizations, labs, imaging, and/or referrals for you.  A list of these orders (if applicable) as well as your Preventive Care list are included within your After Visit Summary for your review.    Other Preventive Recommendations:    A preventive eye exam performed by an eye specialist is recommended every 1-2 years to screen for glaucoma; cataracts, macular degeneration, and other eye disorders.  A preventive dental visit is recommended every 6 months.  Try to get at least 150 minutes of exercise per week or 10,000 steps per day on a pedometer .  Order or download the FREE \"Exercise & Physical Activity: Your Everyday Guide\" from [employer] [place] [employer]. Call 1-710.572.7733 or search [employer] [place] [employer] online.  You need 8175-2941 mg of calcium and 8711-1732 IU of vitamin D per day. It is possible to meet your calcium requirement with diet alone, but a vitamin D supplement is usually necessary to meet this goal.  When exposed to the sun,

## 2024-12-02 ENCOUNTER — HOSPITAL ENCOUNTER (OUTPATIENT)
Facility: HOSPITAL | Age: 79
Discharge: HOME | End: 2024-12-05
Payer: MEDICARE

## 2024-12-02 DIAGNOSIS — R05.3 CHRONIC COUGH: ICD-10-CM

## 2024-12-02 PROCEDURE — 71046 X-RAY EXAM CHEST 2 VIEWS: CPT

## 2024-12-03 ENCOUNTER — TELEPHONE (OUTPATIENT)
Facility: CLINIC | Age: 79
End: 2024-12-03

## 2024-12-03 DIAGNOSIS — R05.3 CHRONIC COUGH: ICD-10-CM

## 2024-12-03 DIAGNOSIS — R93.89 ABNORMAL CXR: Primary | ICD-10-CM

## 2024-12-03 RX ORDER — BLOOD SUGAR DIAGNOSTIC
STRIP MISCELLANEOUS
Qty: 300 STRIP | Refills: 3 | Status: SHIPPED | OUTPATIENT
Start: 2024-12-03

## 2024-12-03 RX ORDER — SPIRONOLACTONE 25 MG/1
25 TABLET ORAL DAILY
Qty: 90 TABLET | Refills: 2 | Status: SHIPPED | OUTPATIENT
Start: 2024-12-03

## 2024-12-03 RX ORDER — LOSARTAN POTASSIUM 50 MG/1
TABLET ORAL
Qty: 90 TABLET | Refills: 2 | Status: SHIPPED | OUTPATIENT
Start: 2024-12-03

## 2024-12-03 NOTE — TELEPHONE ENCOUNTER
PCP: Nano Mancilla MD     Last appt:  11/26/2024      Future Appointments   Date Time Provider Department Center   5/27/2025  8:30 AM Nano Mancilla MD [facility] DEP          Requested Prescriptions     Pending Prescriptions Disp Refills    metFORMIN (GLUCOPHAGE) 500 MG tablet [Pharmacy Med Name: METFORMIN HCL 500MG TABS] 360 tablet 2     Sig: TAKE TWO TABLETS BY MOUTH IN THE MORNING , 1 TABLET AT NOON, & 1 TABLET AT DINNER TIME    spironolactone (ALDACTONE) 25 MG tablet [Pharmacy Med Name: SPIRONOLACTONE 25MG TABS] 90 tablet 2     Sig: TAKE ONE TABLET DAILY    losartan (COZAAR) 50 MG tablet [Pharmacy Med Name: LOSARTAN POTASSIUM 50MG TABS] 90 tablet 2     Sig: TAKE ONE TABLET ONCE DAILY    blood glucose test strips (ONETOUCH ULTRA) strip [Pharmacy Med Name: ONETOUCH ULTRA  STRP] 300 strip 3     Sig: TEST BLOOD SUGAR THREE TIMES DAILY

## 2024-12-03 NOTE — TELEPHONE ENCOUNTER
Called patient about CXR results: Reports cough is about the same - no f/c, chest pain, sob, no night sweats. Says she does cough most days, not all day. Feels like dry cough. Just started flonase. Will get chest CT for further evaluation.

## 2024-12-23 ENCOUNTER — TRANSCRIBE ORDERS (OUTPATIENT)
Facility: HOSPITAL | Age: 79
End: 2024-12-23

## 2024-12-23 ENCOUNTER — HOSPITAL ENCOUNTER (OUTPATIENT)
Facility: HOSPITAL | Age: 79
Discharge: HOME | End: 2024-12-26
Attending: INTERNAL MEDICINE
Payer: MEDICARE

## 2024-12-23 DIAGNOSIS — Z00.8 HEALTH EXAMINATION IN POPULATION SURVEY: ICD-10-CM

## 2024-12-23 DIAGNOSIS — R93.89 ABNORMAL CXR: ICD-10-CM

## 2024-12-23 DIAGNOSIS — H61.23 BILATERAL IMPACTED CERUMEN: ICD-10-CM

## 2024-12-23 DIAGNOSIS — R05.3 CHRONIC COUGH: ICD-10-CM

## 2024-12-23 DIAGNOSIS — Z78.9 NON-SMOKER: Primary | ICD-10-CM

## 2024-12-23 DIAGNOSIS — H90.3 HEARING LOSS, SENSORINEURAL, ASYMMETRICAL: ICD-10-CM

## 2024-12-23 PROCEDURE — 71250 CT THORAX DX C-: CPT

## 2025-01-03 ENCOUNTER — PATIENT MESSAGE (OUTPATIENT)
Facility: CLINIC | Age: 80
End: 2025-01-03

## 2025-01-03 DIAGNOSIS — I31.39 PERICARDIAL EFFUSION: Primary | ICD-10-CM

## 2025-01-14 ENCOUNTER — HOSPITAL ENCOUNTER (OUTPATIENT)
Facility: HOSPITAL | Age: 80
Discharge: HOME | End: 2025-01-17
Payer: MEDICARE

## 2025-01-14 DIAGNOSIS — Z78.9 NON-SMOKER: ICD-10-CM

## 2025-01-14 DIAGNOSIS — Z00.8 HEALTH EXAMINATION IN POPULATION SURVEY: ICD-10-CM

## 2025-01-14 DIAGNOSIS — H90.3 HEARING LOSS, SENSORINEURAL, ASYMMETRICAL: ICD-10-CM

## 2025-01-14 DIAGNOSIS — H61.23 BILATERAL IMPACTED CERUMEN: ICD-10-CM

## 2025-01-14 PROCEDURE — 70553 MRI BRAIN STEM W/O & W/DYE: CPT

## 2025-01-14 PROCEDURE — 6360000004 HC RX CONTRAST MEDICATION: Performed by: NURSE PRACTITIONER

## 2025-01-14 PROCEDURE — A9579 GAD-BASE MR CONTRAST NOS,1ML: HCPCS | Performed by: NURSE PRACTITIONER

## 2025-01-14 RX ADMIN — GADOTERIDOL 14 ML: 279.3 INJECTION, SOLUTION INTRAVENOUS at 14:35

## 2025-01-17 RX ORDER — INSULIN GLARGINE 100 [IU]/ML
INJECTION, SOLUTION SUBCUTANEOUS
Qty: 10 ML | Refills: 1 | Status: SHIPPED | OUTPATIENT
Start: 2025-01-17

## 2025-01-17 NOTE — TELEPHONE ENCOUNTER
PCP: Nano Mancilla MD     Last appt:  11/26/2024      Future Appointments   Date Time Provider Department Center   1/24/2025 11:00 AM hospitals ECHO ROOM [facility]   5/27/2025  8:30 AM Nano Mancilla MD [facility] DEP          Requested Prescriptions     Pending Prescriptions Disp Refills    insulin glargine (LANTUS) 100 UNIT/ML injection vial [Pharmacy Med Name: LANTUS 100UNIT/ML SOLN] 10 mL 1     Sig: INJECT 14 UNITS SUBCUTANEOUSLY IN THE MORNING AND 3 UNITS IN THE EVENING

## 2025-01-24 ENCOUNTER — HOSPITAL ENCOUNTER (OUTPATIENT)
Facility: HOSPITAL | Age: 80
Discharge: HOME | End: 2025-01-26
Attending: INTERNAL MEDICINE
Payer: MEDICARE

## 2025-01-24 DIAGNOSIS — I31.39 PERICARDIAL EFFUSION: ICD-10-CM

## 2025-01-24 LAB
ECHO AO ROOT DIAM: 3.2 CM
ECHO AR MAX VEL PISA: 3.7 M/S
ECHO AV AREA PEAK VELOCITY: 3 CM2
ECHO AV AREA VTI: 3.1 CM2
ECHO AV CUSP MM: 1.7 CM
ECHO AV MEAN GRADIENT: 3 MMHG
ECHO AV MEAN VELOCITY: 0.9 M/S
ECHO AV PEAK GRADIENT: 6 MMHG
ECHO AV PEAK GRADIENT: 6 MMHG
ECHO AV PEAK VELOCITY: 1.3 M/S
ECHO AV PEAK VELOCITY: 1.3 M/S
ECHO AV REGURGITANT PHT: 240.2 MS
ECHO AV VTI: 25.4 CM
ECHO LA VOL A-L A2C: 45 ML (ref 22–52)
ECHO LA VOL A-L A4C: 33 ML (ref 22–52)
ECHO LA VOL MOD A2C: 42 ML (ref 22–52)
ECHO LA VOL MOD A4C: 31 ML (ref 22–52)
ECHO LA VOLUME AREA LENGTH: 41 ML
ECHO LV E' LATERAL VELOCITY: 4.79 CM/S
ECHO LV E' SEPTAL VELOCITY: 8.63 CM/S
ECHO LV EF PHYSICIAN: 60 %
ECHO LVOT AREA: 3.1 CM2
ECHO LVOT AV VTI INDEX: 0.94
ECHO LVOT DIAM: 2 CM
ECHO LVOT MEAN GRADIENT: 2 MMHG
ECHO LVOT PEAK GRADIENT: 5 MMHG
ECHO LVOT PEAK GRADIENT: 5 MMHG
ECHO LVOT PEAK VELOCITY: 1.1 M/S
ECHO LVOT PEAK VELOCITY: 1.2 M/S
ECHO LVOT SV: 74.7 ML
ECHO LVOT VTI: 23.8 CM
ECHO MV A VELOCITY: 0.94 M/S
ECHO MV AREA VTI: 3.4 CM2
ECHO MV E DECELERATION TIME (DT): 188.7 MS
ECHO MV E VELOCITY: 0.7 M/S
ECHO MV E/A RATIO: 0.74
ECHO MV E/E' LATERAL: 14.61
ECHO MV E/E' RATIO (AVERAGED): 11.36
ECHO MV E/E' SEPTAL: 8.11
ECHO MV LVOT VTI INDEX: 0.93
ECHO MV MAX VELOCITY: 1 M/S
ECHO MV MEAN GRADIENT: 1 MMHG
ECHO MV MEAN VELOCITY: 0.6 M/S
ECHO MV PEAK GRADIENT: 4 MMHG
ECHO MV VTI: 22.2 CM
ECHO RV FREE WALL PEAK S': 12.9 CM/S
ECHO RV INTERNAL DIMENSION: 3.8 CM

## 2025-01-24 PROCEDURE — 93306 TTE W/DOPPLER COMPLETE: CPT

## 2025-03-03 RX ORDER — SYRINGE-NEEDLE,INSULIN,0.5 ML 27GX1/2"
SYRINGE, EMPTY DISPOSABLE MISCELLANEOUS
Qty: 100 EACH | Refills: 5 | Status: SHIPPED | OUTPATIENT
Start: 2025-03-03

## 2025-03-03 NOTE — TELEPHONE ENCOUNTER
Future Appointments:  Future Appointments   Date Time Provider Department Center   5/27/2025  8:30 AM Nano Mancilla MD [facility] DEP        Last Appointment With Me:  11/26/2024     Requested Prescriptions     Pending Prescriptions Disp Refills    Insulin Syringe-Needle U-100 (BD INSULIN SYRINGE U/F) 31G X 5/16\" 0.5 ML MISC [Pharmacy Med Name: BD INS SY 0.5ML 31GX5/16] 100 each 5     Sig: USE AS DIRECTED TWO TIMES A DAY

## 2025-03-19 RX ORDER — INSULIN GLARGINE 100 [IU]/ML
INJECTION, SOLUTION SUBCUTANEOUS
Qty: 10 ML | Refills: 1 | Status: SHIPPED | OUTPATIENT
Start: 2025-03-19

## 2025-03-19 NOTE — TELEPHONE ENCOUNTER
PCP: Nano Mancilla MD     Last appt: 11/26/2024    Future Appointments   Date Time Provider Department Center   5/27/2025  8:30 AM Nano Mancilla MD [facility] DEP          Requested Prescriptions     Pending Prescriptions Disp Refills    insulin glargine (LANTUS) 100 UNIT/ML injection vial [Pharmacy Med Name: LANTUS 100UNIT/ML SOLN] 10 mL 1     Sig: INJECT 14 UNITS SUBCUTANEOUSLY IN THE MORNING AND 3 UNITS IN THE EVENING

## 2025-05-19 ENCOUNTER — PATIENT MESSAGE (OUTPATIENT)
Facility: CLINIC | Age: 80
End: 2025-05-19

## 2025-05-19 DIAGNOSIS — E11.9 TYPE 2 DIABETES MELLITUS WITHOUT COMPLICATION, WITH LONG-TERM CURRENT USE OF INSULIN (HCC): Primary | ICD-10-CM

## 2025-05-19 DIAGNOSIS — Z79.4 TYPE 2 DIABETES MELLITUS WITHOUT COMPLICATION, WITH LONG-TERM CURRENT USE OF INSULIN (HCC): Primary | ICD-10-CM

## 2025-05-19 RX ORDER — INSULIN GLARGINE 100 [IU]/ML
INJECTION, SOLUTION SUBCUTANEOUS
Qty: 10 ML | Refills: 1 | Status: SHIPPED | OUTPATIENT
Start: 2025-05-19

## 2025-05-19 NOTE — TELEPHONE ENCOUNTER
PCP: Nano Mancilla MD     Last appt:  11/26/2024      Future Appointments   Date Time Provider Department Center   5/27/2025  8:30 AM Nano Mancilla MD [facility] DEP          Requested Prescriptions     Pending Prescriptions Disp Refills    insulin glargine (LANTUS) 100 UNIT/ML injection vial [Pharmacy Med Name: LANTUS 100UNIT/ML SOLN] 10 mL 1     Sig: INJECT 14 UNITS SUBCUTANEOUSLY IN THE MORNING AND 3 UNITS IN THE EVENING

## 2025-05-21 ENCOUNTER — RESULTS FOLLOW-UP (OUTPATIENT)
Facility: CLINIC | Age: 80
End: 2025-05-21

## 2025-05-21 LAB
ALBUMIN SERPL-MCNC: 4.3 G/DL (ref 3.7–4.7)
ALP SERPL-CCNC: 62 IU/L (ref 44–121)
ALT SERPL-CCNC: 16 IU/L (ref 0–32)
AST SERPL-CCNC: 24 IU/L (ref 0–40)
BILIRUB SERPL-MCNC: 0.3 MG/DL (ref 0–1.2)
BUN SERPL-MCNC: 17 MG/DL (ref 8–27)
BUN/CREAT SERPL: 21 (ref 12–28)
CALCIUM SERPL-MCNC: 9.7 MG/DL (ref 8.7–10.3)
CHLORIDE SERPL-SCNC: 101 MMOL/L (ref 96–106)
CO2 SERPL-SCNC: 26 MMOL/L (ref 20–29)
CREAT SERPL-MCNC: 0.81 MG/DL (ref 0.57–1)
EGFRCR SERPLBLD CKD-EPI 2021: 73 ML/MIN/1.73
GLOBULIN SER CALC-MCNC: 2.4 G/DL (ref 1.5–4.5)
GLUCOSE SERPL-MCNC: 77 MG/DL (ref 70–99)
HBA1C MFR BLD: 7.4 % (ref 4.8–5.6)
POTASSIUM SERPL-SCNC: 4.7 MMOL/L (ref 3.5–5.2)
PROT SERPL-MCNC: 6.7 G/DL (ref 6–8.5)
SODIUM SERPL-SCNC: 140 MMOL/L (ref 134–144)

## 2025-05-24 SDOH — ECONOMIC STABILITY: FOOD INSECURITY: WITHIN THE PAST 12 MONTHS, THE FOOD YOU BOUGHT JUST DIDN'T LAST AND YOU DIDN'T HAVE MONEY TO GET MORE.: NEVER TRUE

## 2025-05-24 SDOH — ECONOMIC STABILITY: INCOME INSECURITY: IN THE LAST 12 MONTHS, WAS THERE A TIME WHEN YOU WERE NOT ABLE TO PAY THE MORTGAGE OR RENT ON TIME?: NO

## 2025-05-24 SDOH — ECONOMIC STABILITY: FOOD INSECURITY: WITHIN THE PAST 12 MONTHS, YOU WORRIED THAT YOUR FOOD WOULD RUN OUT BEFORE YOU GOT MONEY TO BUY MORE.: NEVER TRUE

## 2025-05-24 SDOH — ECONOMIC STABILITY: TRANSPORTATION INSECURITY
IN THE PAST 12 MONTHS, HAS LACK OF TRANSPORTATION KEPT YOU FROM MEETINGS, WORK, OR FROM GETTING THINGS NEEDED FOR DAILY LIVING?: NO

## 2025-05-24 SDOH — ECONOMIC STABILITY: TRANSPORTATION INSECURITY
IN THE PAST 12 MONTHS, HAS THE LACK OF TRANSPORTATION KEPT YOU FROM MEDICAL APPOINTMENTS OR FROM GETTING MEDICATIONS?: NO

## 2025-05-27 ENCOUNTER — HOSPITAL ENCOUNTER (OUTPATIENT)
Facility: HOSPITAL | Age: 80
Discharge: HOME | End: 2025-05-30
Payer: MEDICARE

## 2025-05-27 ENCOUNTER — OFFICE VISIT (OUTPATIENT)
Facility: CLINIC | Age: 80
End: 2025-05-27
Payer: MEDICARE

## 2025-05-27 VITALS
HEART RATE: 79 BPM | BODY MASS INDEX: 24.51 KG/M2 | WEIGHT: 152.5 LBS | OXYGEN SATURATION: 95 % | RESPIRATION RATE: 12 BRPM | DIASTOLIC BLOOD PRESSURE: 67 MMHG | TEMPERATURE: 97.9 F | SYSTOLIC BLOOD PRESSURE: 120 MMHG | HEIGHT: 66 IN

## 2025-05-27 DIAGNOSIS — I10 ESSENTIAL HYPERTENSION: ICD-10-CM

## 2025-05-27 DIAGNOSIS — Z79.4 TYPE 2 DIABETES MELLITUS WITHOUT COMPLICATION, WITH LONG-TERM CURRENT USE OF INSULIN (HCC): Primary | ICD-10-CM

## 2025-05-27 DIAGNOSIS — M79.651 RIGHT THIGH PAIN: ICD-10-CM

## 2025-05-27 DIAGNOSIS — E11.9 TYPE 2 DIABETES MELLITUS WITHOUT COMPLICATION, WITH LONG-TERM CURRENT USE OF INSULIN (HCC): Primary | ICD-10-CM

## 2025-05-27 DIAGNOSIS — E78.2 MIXED HYPERLIPIDEMIA: ICD-10-CM

## 2025-05-27 PROCEDURE — 73552 X-RAY EXAM OF FEMUR 2/>: CPT

## 2025-05-27 PROCEDURE — 1036F TOBACCO NON-USER: CPT | Performed by: INTERNAL MEDICINE

## 2025-05-27 PROCEDURE — 1159F MED LIST DOCD IN RCRD: CPT | Performed by: INTERNAL MEDICINE

## 2025-05-27 PROCEDURE — 1123F ACP DISCUSS/DSCN MKR DOCD: CPT | Performed by: INTERNAL MEDICINE

## 2025-05-27 PROCEDURE — 99214 OFFICE O/P EST MOD 30 MIN: CPT | Performed by: INTERNAL MEDICINE

## 2025-05-27 PROCEDURE — 1160F RVW MEDS BY RX/DR IN RCRD: CPT | Performed by: INTERNAL MEDICINE

## 2025-05-27 PROCEDURE — G8399 PT W/DXA RESULTS DOCUMENT: HCPCS | Performed by: INTERNAL MEDICINE

## 2025-05-27 PROCEDURE — 1090F PRES/ABSN URINE INCON ASSESS: CPT | Performed by: INTERNAL MEDICINE

## 2025-05-27 PROCEDURE — G8420 CALC BMI NORM PARAMETERS: HCPCS | Performed by: INTERNAL MEDICINE

## 2025-05-27 PROCEDURE — G8427 DOCREV CUR MEDS BY ELIG CLIN: HCPCS | Performed by: INTERNAL MEDICINE

## 2025-05-27 PROCEDURE — 3051F HG A1C>EQUAL 7.0%<8.0%: CPT | Performed by: INTERNAL MEDICINE

## 2025-05-27 PROCEDURE — 1126F AMNT PAIN NOTED NONE PRSNT: CPT | Performed by: INTERNAL MEDICINE

## 2025-05-27 PROCEDURE — 3074F SYST BP LT 130 MM HG: CPT | Performed by: INTERNAL MEDICINE

## 2025-05-27 PROCEDURE — 3078F DIAST BP <80 MM HG: CPT | Performed by: INTERNAL MEDICINE

## 2025-05-27 ASSESSMENT — PATIENT HEALTH QUESTIONNAIRE - PHQ9
2. FEELING DOWN, DEPRESSED OR HOPELESS: NOT AT ALL
SUM OF ALL RESPONSES TO PHQ QUESTIONS 1-9: 0
SUM OF ALL RESPONSES TO PHQ QUESTIONS 1-9: 0
1. LITTLE INTEREST OR PLEASURE IN DOING THINGS: NOT AT ALL
SUM OF ALL RESPONSES TO PHQ QUESTIONS 1-9: 0
SUM OF ALL RESPONSES TO PHQ QUESTIONS 1-9: 0

## 2025-05-27 NOTE — PROGRESS NOTES
HPI  Ms. Lynn Bernal is a 79 y.o. year old female, she is seen today for follow up DM.   Has dexcom - seems that it reads lower than actual blood sugar. Says she has calibrated it but it still runs lower than actual glucose level.     No chest pain, sob, dizziness, weakness, lightheadedness.     Right thigh pain for about 6 weeks. Worse with walking, standing. Feels more like surface pain, not deep pain. Did fall once while on vacation in [place] - fell forward - didn't remember pain after falling but later. No swelling. No pain at rest while sitting or lying down. Not getting worse. No change in chronic low back pain. No hip pain.     Chief Complaint   Patient presents with    Diabetes    thigh pain     Mostly when standing        Prior to Admission medications    Medication Sig Start Date End Date Taking? Authorizing Provider   insulin glargine (LANTUS) 100 UNIT/ML injection vial INJECT 14 UNITS SUBCUTANEOUSLY IN THE MORNING AND 3 UNITS IN THE EVENING 5/19/25  Yes Nano Mancilla MD   Insulin Syringe-Needle U-100 (BD INSULIN SYRINGE U/F) 31G X 5/16\" 0.5 ML MISC USE AS DIRECTED TWO TIMES A DAY 3/3/25  Yes Nano Mancilla MD   metFORMIN (GLUCOPHAGE) 500 MG tablet TAKE TWO TABLETS BY MOUTH IN THE MORNING , 1 TABLET AT NOON, & 1 TABLET AT DINNER TIME 12/3/24  Yes Nano Mancilla MD   spironolactone (ALDACTONE) 25 MG tablet TAKE ONE TABLET DAILY 12/3/24  Yes Nano Mancilla MD   losartan (COZAAR) 50 MG tablet TAKE ONE TABLET ONCE DAILY 12/3/24  Yes Nano Mancilla MD   blood glucose test strips (ONETOUCH ULTRA) strip TEST BLOOD SUGAR THREE TIMES DAILY 12/3/24  Yes Nano Mancilla MD   rosuvastatin (CRESTOR) 5 MG tablet Take 1 tablet by mouth daily 11/26/24  Yes Nano Mancilla MD   Continuous Glucose  (DEXCOM G7 ) CARISSA Use to check glucose qid due to insulin dependant diabetes with hypoglycemia 11/26/24  Yes Nano Mancilla MD   Continuous Glucose Sensor (DEXCOM G7

## 2025-05-27 NOTE — PROGRESS NOTES
Have you been to the ER, urgent care clinic since your last visit?  Hospitalized since your last visit?   NO    Have you seen or consulted any other health care providers outside our system since your last visit?   YES - When: approximately 4 months ago.  Where and Why: ENT Dec and Dr Rivers.        {Click Here for Release of Records Request :[id number]}

## 2025-06-02 ENCOUNTER — RESULTS FOLLOW-UP (OUTPATIENT)
Facility: CLINIC | Age: 80
End: 2025-06-02

## 2025-06-12 NOTE — TELEPHONE ENCOUNTER
PCP: Nano Mancilla MD     Last appt: 5/27/2025    Future Appointments   Date Time Provider Department Center   12/1/2025  8:00 AM LAB [facility] DEP   12/8/2025  8:50 AM Naon Mancilla MD [facility] DEP          Requested Prescriptions     Pending Prescriptions Disp Refills    metFORMIN (GLUCOPHAGE) 500 MG tablet [Pharmacy Med Name: METFORMIN HCL 500MG TABS] 360 tablet 2     Sig: TAKE TWO TABLETS BY MOUTH IN THE MORNING , 1 TABLET AT NOON, & 1 TABLET AT DINNER TIME

## 2025-07-18 RX ORDER — INSULIN GLARGINE 100 [IU]/ML
INJECTION, SOLUTION SUBCUTANEOUS
Qty: 10 ML | Refills: 1 | Status: SHIPPED | OUTPATIENT
Start: 2025-07-18

## 2025-07-18 NOTE — TELEPHONE ENCOUNTER
PCP: Nano Mancilla MD     Last appt: 5/27/2025    Future Appointments   Date Time Provider Department Center   12/1/2025  8:00 AM LAB [facility] DEP   12/8/2025  8:50 AM Nano Mancilla MD [facility] DEP          Requested Prescriptions     Pending Prescriptions Disp Refills    insulin glargine (LANTUS) 100 UNIT/ML injection vial [Pharmacy Med Name: LANTUS 100UNIT/ML SOLN] 10 mL 1     Sig: INJECT 14 UNITS SUBCUTANEOUSLY IN THE MORNING AND 3 UNITS IN THE EVENING

## 2025-07-28 ENCOUNTER — TRANSCRIBE ORDERS (OUTPATIENT)
Facility: HOSPITAL | Age: 80
End: 2025-07-28

## 2025-07-28 DIAGNOSIS — Z12.31 OTHER SCREENING MAMMOGRAM: Primary | ICD-10-CM

## 2025-08-11 DIAGNOSIS — M54.50 CHRONIC MIDLINE LOW BACK PAIN WITHOUT SCIATICA: ICD-10-CM

## 2025-08-11 DIAGNOSIS — G89.29 CHRONIC MIDLINE LOW BACK PAIN WITHOUT SCIATICA: ICD-10-CM

## 2025-08-11 RX ORDER — MELOXICAM 7.5 MG/1
TABLET ORAL
Qty: 30 TABLET | Refills: 2 | Status: SHIPPED | OUTPATIENT
Start: 2025-08-11

## (undated) DEVICE — SYRINGE INSULIN 1ML LUERSLIP TIP W/O SFTY U100 BLISTER PK

## (undated) DEVICE — SNARE ENDOSCP M L240CM W27MM SHTH DIA2.4MM CHN 2.8MM OVL

## (undated) DEVICE — ELECTRODE,RADIOTRANSLUCENT,FOAM,5PK: Brand: MEDLINE

## (undated) DEVICE — Device

## (undated) DEVICE — SET ADMIN 16ML TBNG L100IN 2 Y INJ SITE IV PIGGY BK DISP (ORDER IN MULIPLES OF 48)

## (undated) DEVICE — CATH IV AUTOGRD BC PNK 20GA 25 -- INSYTE

## (undated) DEVICE — SYR 5ML 1/5 GRAD LL NSAF LF --

## (undated) DEVICE — SYR 10ML LUER LOK 1/5ML GRAD --

## (undated) DEVICE — STERILE POLYISOPRENE POWDER-FREE SURGICAL GLOVES: Brand: PROTEXIS

## (undated) DEVICE — BASIN EMSIS 16OZ GRAPHITE PLAS KID SHP MOLD GRAD FOR ORAL

## (undated) DEVICE — SURGICAL PROCEDURE PACK CATRCT CUST

## (undated) DEVICE — Z DISCONTINUED PER MEDLINE LINE GAS SAMPLING O2/CO2 LNG AD 13 FT NSL W/ TBNG FILTERLINE

## (undated) DEVICE — SYR 3ML LL TIP 1/10ML GRAD --

## (undated) DEVICE — 1200 GUARD II KIT W/5MM TUBE W/O VAC TUBE: Brand: GUARDIAN

## (undated) DEVICE — SOLUTION IV STRL H2O 500 ML AQUALITE POUR BTL

## (undated) DEVICE — CONTAINER SPEC 20 ML LID NEUT BUFF FORMALIN 10 % POLYPR STS

## (undated) DEVICE — TOWEL 4 PLY TISS 19X30 SUE WHT

## (undated) DEVICE — THE MONARCH® "D" CARTRIDGE IS A SINGLE-USE POLYPROPYLENE CARTRIDGE FOR POSTERIOR CHAMBER IOL DELIVERY: Brand: MONARCH® III

## (undated) DEVICE — HIGH FLOW RATE EXTENSION SET, LUER LOCK ADAPTERS

## (undated) DEVICE — TRAP,MUCUS SPECIMEN, 80CC: Brand: MEDLINE

## (undated) DEVICE — NDL FLTR TIP 5 MIC 18GX1.5IN --

## (undated) DEVICE — STRAINER URIN CALC RNL MSH -- CONVERT TO ITEM 357634

## (undated) DEVICE — NON-REM POLYHESIVE PATIENT RETURN ELECTRODE: Brand: VALLEYLAB

## (undated) DEVICE — 3M™ TEGADERM™ TRANSPARENT FILM DRESSING FRAME STYLE, 1624W, 2-3/8 IN X 2-3/4 IN (6 CM X 7 CM), 100/CT 4CT/CASE: Brand: 3M™ TEGADERM™

## (undated) DEVICE — KENDALL DL ECG CABLE AND LEAD WIRE SYSTEM, 3-LEAD, SINGLE PATIENT USE: Brand: KENDALL

## (undated) DEVICE — NEONATAL-ADULT SPO2 SENSOR: Brand: NELLCOR

## (undated) DEVICE — HYPODERMIC SAFETY NEEDLE: Brand: MAGELLAN